# Patient Record
Sex: FEMALE | HISPANIC OR LATINO | Employment: PART TIME | URBAN - METROPOLITAN AREA
[De-identification: names, ages, dates, MRNs, and addresses within clinical notes are randomized per-mention and may not be internally consistent; named-entity substitution may affect disease eponyms.]

---

## 2018-06-20 DIAGNOSIS — Z01.812 PRE-PROCEDURAL LABORATORY EXAMINATION: ICD-10-CM

## 2018-06-20 LAB
HCG SERPL QL: NEGATIVE
INR PPP: 1.02 (ref 0.87–1.13)
PLATELET # BLD AUTO: 251 K/UL (ref 164–446)
PROTHROMBIN TIME: 13.1 SEC (ref 12–14.6)

## 2018-06-20 PROCEDURE — 84703 CHORIONIC GONADOTROPIN ASSAY: CPT

## 2018-06-20 PROCEDURE — 85610 PROTHROMBIN TIME: CPT

## 2018-06-20 PROCEDURE — 85049 AUTOMATED PLATELET COUNT: CPT

## 2018-06-20 PROCEDURE — 36415 COLL VENOUS BLD VENIPUNCTURE: CPT

## 2018-06-20 RX ORDER — MYCOPHENOLATE MOFETIL 500 MG/1
500 TABLET ORAL 2 TIMES DAILY
Status: ON HOLD | COMMUNITY
End: 2018-06-22

## 2018-06-21 ENCOUNTER — APPOINTMENT (OUTPATIENT)
Dept: RADIOLOGY | Facility: MEDICAL CENTER | Age: 28
End: 2018-06-21
Attending: INTERNAL MEDICINE
Payer: COMMERCIAL

## 2018-06-21 ENCOUNTER — HOSPITAL ENCOUNTER (INPATIENT)
Facility: MEDICAL CENTER | Age: 28
LOS: 7 days | DRG: 919 | End: 2018-06-29
Attending: EMERGENCY MEDICINE | Admitting: INTERNAL MEDICINE
Payer: COMMERCIAL

## 2018-06-21 ENCOUNTER — APPOINTMENT (OUTPATIENT)
Dept: RADIOLOGY | Facility: MEDICAL CENTER | Age: 28
DRG: 919 | End: 2018-06-21
Attending: EMERGENCY MEDICINE
Payer: COMMERCIAL

## 2018-06-21 ENCOUNTER — HOSPITAL ENCOUNTER (OUTPATIENT)
Facility: MEDICAL CENTER | Age: 28
End: 2018-06-21
Attending: INTERNAL MEDICINE | Admitting: INTERNAL MEDICINE
Payer: COMMERCIAL

## 2018-06-21 VITALS
OXYGEN SATURATION: 95 % | WEIGHT: 156.53 LBS | HEART RATE: 95 BPM | RESPIRATION RATE: 16 BRPM | HEIGHT: 65 IN | SYSTOLIC BLOOD PRESSURE: 144 MMHG | BODY MASS INDEX: 26.08 KG/M2 | DIASTOLIC BLOOD PRESSURE: 98 MMHG | TEMPERATURE: 97.8 F

## 2018-06-21 DIAGNOSIS — R80.9 PROTEINURIA, UNSPECIFIED TYPE: ICD-10-CM

## 2018-06-21 DIAGNOSIS — N18.30 CHRONIC KIDNEY DISEASE, STAGE III (MODERATE) (HCC): ICD-10-CM

## 2018-06-21 DIAGNOSIS — M32.14 LUPUS NEPHRITIS (HCC): ICD-10-CM

## 2018-06-21 LAB
ABO GROUP BLD: NORMAL
ALBUMIN SERPL BCP-MCNC: 2.4 G/DL (ref 3.2–4.9)
ALBUMIN/GLOB SERPL: 0.6 G/DL
ALP SERPL-CCNC: 30 U/L (ref 30–99)
ALT SERPL-CCNC: 5 U/L (ref 2–50)
ANION GAP SERPL CALC-SCNC: 8 MMOL/L (ref 0–11.9)
ANISOCYTOSIS BLD QL SMEAR: ABNORMAL
AST SERPL-CCNC: 8 U/L (ref 12–45)
BARCODED ABORH UBTYP: 7300
BARCODED PRD CODE UBPRD: NORMAL
BARCODED UNIT NUM UBUNT: NORMAL
BASOPHILS # BLD AUTO: 0 % (ref 0–1.8)
BASOPHILS # BLD: 0 K/UL (ref 0–0.12)
BILIRUB SERPL-MCNC: 0.3 MG/DL (ref 0.1–1.5)
BLD GP AB SCN SERPL QL: NORMAL
BUN SERPL-MCNC: 69 MG/DL (ref 8–22)
CALCIUM SERPL-MCNC: 7.7 MG/DL (ref 8.5–10.5)
CHLORIDE SERPL-SCNC: 114 MMOL/L (ref 96–112)
CO2 SERPL-SCNC: 16 MMOL/L (ref 20–33)
COMPONENT R 8504R: NORMAL
CREAT SERPL-MCNC: 3.78 MG/DL (ref 0.5–1.4)
EOSINOPHIL # BLD AUTO: 0 K/UL (ref 0–0.51)
EOSINOPHIL NFR BLD: 0 % (ref 0–6.9)
ERYTHROCYTE [DISTWIDTH] IN BLOOD BY AUTOMATED COUNT: 53.2 FL (ref 35.9–50)
GLOBULIN SER CALC-MCNC: 3.9 G/DL (ref 1.9–3.5)
GLUCOSE SERPL-MCNC: 117 MG/DL (ref 65–99)
HCT VFR BLD AUTO: 22.3 % (ref 37–47)
HGB BLD-MCNC: 6.8 G/DL (ref 12–16)
INR PPP: 1.12 (ref 0.87–1.13)
LYMPHOCYTES # BLD AUTO: 1.19 K/UL (ref 1–4.8)
LYMPHOCYTES NFR BLD: 9.8 % (ref 22–41)
MACROCYTES BLD QL SMEAR: ABNORMAL
MANUAL DIFF BLD: NORMAL
MCH RBC QN AUTO: 29.1 PG (ref 27–33)
MCHC RBC AUTO-ENTMCNC: 30.5 G/DL (ref 33.6–35)
MCV RBC AUTO: 95.3 FL (ref 81.4–97.8)
METAMYELOCYTES NFR BLD MANUAL: 1.8 %
MICROCYTES BLD QL SMEAR: ABNORMAL
MONOCYTES # BLD AUTO: 0.65 K/UL (ref 0–0.85)
MONOCYTES NFR BLD AUTO: 5.4 % (ref 0–13.4)
MORPHOLOGY BLD-IMP: NORMAL
NEUTROPHILS # BLD AUTO: 10.04 K/UL (ref 2–7.15)
NEUTROPHILS NFR BLD: 83 % (ref 44–72)
NRBC # BLD AUTO: 0 K/UL
NRBC BLD-RTO: 0 /100 WBC
PLATELET # BLD AUTO: 263 K/UL (ref 164–446)
PLATELET BLD QL SMEAR: NORMAL
PMV BLD AUTO: 9.5 FL (ref 9–12.9)
POLYCHROMASIA BLD QL SMEAR: NORMAL
POTASSIUM SERPL-SCNC: 5.1 MMOL/L (ref 3.6–5.5)
PRODUCT TYPE UPROD: NORMAL
PROT SERPL-MCNC: 6.3 G/DL (ref 6–8.2)
PROTHROMBIN TIME: 14.1 SEC (ref 12–14.6)
RBC # BLD AUTO: 2.34 M/UL (ref 4.2–5.4)
RBC BLD AUTO: PRESENT
RH BLD: NORMAL
SCHISTOCYTES BLD QL SMEAR: NORMAL
SODIUM SERPL-SCNC: 138 MMOL/L (ref 135–145)
UNIT STATUS USTAT: NORMAL
WBC # BLD AUTO: 12.1 K/UL (ref 4.8–10.8)

## 2018-06-21 PROCEDURE — 36430 TRANSFUSION BLD/BLD COMPNT: CPT

## 2018-06-21 PROCEDURE — 80053 COMPREHEN METABOLIC PANEL: CPT

## 2018-06-21 PROCEDURE — 700111 HCHG RX REV CODE 636 W/ 250 OVERRIDE (IP): Performed by: RADIOLOGY

## 2018-06-21 PROCEDURE — 74176 CT ABD & PELVIS W/O CONTRAST: CPT

## 2018-06-21 PROCEDURE — 50200 RENAL BIOPSY PERQ: CPT

## 2018-06-21 PROCEDURE — 700111 HCHG RX REV CODE 636 W/ 250 OVERRIDE (IP)

## 2018-06-21 PROCEDURE — 85610 PROTHROMBIN TIME: CPT

## 2018-06-21 PROCEDURE — 30233N1 TRANSFUSION OF NONAUTOLOGOUS RED BLOOD CELLS INTO PERIPHERAL VEIN, PERCUTANEOUS APPROACH: ICD-10-PCS | Performed by: EMERGENCY MEDICINE

## 2018-06-21 PROCEDURE — 99291 CRITICAL CARE FIRST HOUR: CPT | Performed by: INTERNAL MEDICINE

## 2018-06-21 PROCEDURE — 96361 HYDRATE IV INFUSION ADD-ON: CPT

## 2018-06-21 PROCEDURE — 86901 BLOOD TYPING SEROLOGIC RH(D): CPT

## 2018-06-21 PROCEDURE — 85027 COMPLETE CBC AUTOMATED: CPT

## 2018-06-21 PROCEDURE — 99291 CRITICAL CARE FIRST HOUR: CPT

## 2018-06-21 PROCEDURE — 87086 URINE CULTURE/COLONY COUNT: CPT

## 2018-06-21 PROCEDURE — 86850 RBC ANTIBODY SCREEN: CPT

## 2018-06-21 PROCEDURE — 700105 HCHG RX REV CODE 258: Performed by: EMERGENCY MEDICINE

## 2018-06-21 PROCEDURE — 86923 COMPATIBILITY TEST ELECTRIC: CPT

## 2018-06-21 PROCEDURE — 96376 TX/PRO/DX INJ SAME DRUG ADON: CPT

## 2018-06-21 PROCEDURE — 96375 TX/PRO/DX INJ NEW DRUG ADDON: CPT

## 2018-06-21 PROCEDURE — 36415 COLL VENOUS BLD VENIPUNCTURE: CPT

## 2018-06-21 PROCEDURE — 700111 HCHG RX REV CODE 636 W/ 250 OVERRIDE (IP): Performed by: EMERGENCY MEDICINE

## 2018-06-21 PROCEDURE — 88300 SURGICAL PATH GROSS: CPT

## 2018-06-21 PROCEDURE — A9270 NON-COVERED ITEM OR SERVICE: HCPCS

## 2018-06-21 PROCEDURE — P9016 RBC LEUKOCYTES REDUCED: HCPCS

## 2018-06-21 PROCEDURE — 88333 PATH CONSLTJ SURG CYTO XM 1: CPT

## 2018-06-21 PROCEDURE — 85007 BL SMEAR W/DIFF WBC COUNT: CPT

## 2018-06-21 PROCEDURE — 96374 THER/PROPH/DIAG INJ IV PUSH: CPT

## 2018-06-21 PROCEDURE — 85025 COMPLETE CBC W/AUTO DIFF WBC: CPT

## 2018-06-21 PROCEDURE — 700102 HCHG RX REV CODE 250 W/ 637 OVERRIDE(OP)

## 2018-06-21 PROCEDURE — 86900 BLOOD TYPING SEROLOGIC ABO: CPT

## 2018-06-21 PROCEDURE — 160002 HCHG RECOVERY MINUTES (STAT)

## 2018-06-21 RX ORDER — OXYCODONE HYDROCHLORIDE 5 MG/1
10 TABLET ORAL
Status: DISCONTINUED | OUTPATIENT
Start: 2018-06-21 | End: 2018-06-21 | Stop reason: HOSPADM

## 2018-06-21 RX ORDER — MIDAZOLAM HYDROCHLORIDE 1 MG/ML
.5-2 INJECTION INTRAMUSCULAR; INTRAVENOUS PRN
Status: DISCONTINUED | OUTPATIENT
Start: 2018-06-21 | End: 2018-06-21 | Stop reason: HOSPADM

## 2018-06-21 RX ORDER — FUROSEMIDE 10 MG/ML
10 INJECTION INTRAMUSCULAR; INTRAVENOUS ONCE
Status: DISCONTINUED | OUTPATIENT
Start: 2018-06-21 | End: 2018-06-22

## 2018-06-21 RX ORDER — OXYCODONE HYDROCHLORIDE 5 MG/1
5 TABLET ORAL
Status: DISCONTINUED | OUTPATIENT
Start: 2018-06-21 | End: 2018-06-21 | Stop reason: HOSPADM

## 2018-06-21 RX ORDER — OXYCODONE HYDROCHLORIDE 5 MG/1
TABLET ORAL
Status: COMPLETED
Start: 2018-06-21 | End: 2018-06-21

## 2018-06-21 RX ORDER — LORAZEPAM 2 MG/ML
INJECTION INTRAMUSCULAR
Status: DISPENSED
Start: 2018-06-21 | End: 2018-06-22

## 2018-06-21 RX ORDER — MORPHINE SULFATE 4 MG/ML
4 INJECTION, SOLUTION INTRAMUSCULAR; INTRAVENOUS ONCE
Status: COMPLETED | OUTPATIENT
Start: 2018-06-21 | End: 2018-06-21

## 2018-06-21 RX ORDER — SODIUM CHLORIDE 9 MG/ML
500 INJECTION, SOLUTION INTRAVENOUS
Status: DISCONTINUED | OUTPATIENT
Start: 2018-06-21 | End: 2018-06-21 | Stop reason: HOSPADM

## 2018-06-21 RX ORDER — MORPHINE SULFATE 10 MG/ML
4 INJECTION, SOLUTION INTRAMUSCULAR; INTRAVENOUS
Status: DISCONTINUED | OUTPATIENT
Start: 2018-06-21 | End: 2018-06-21 | Stop reason: HOSPADM

## 2018-06-21 RX ORDER — HYDROMORPHONE HYDROCHLORIDE 2 MG/ML
1 INJECTION, SOLUTION INTRAMUSCULAR; INTRAVENOUS; SUBCUTANEOUS ONCE
Status: COMPLETED | OUTPATIENT
Start: 2018-06-21 | End: 2018-06-21

## 2018-06-21 RX ORDER — SODIUM CHLORIDE 9 MG/ML
1000 INJECTION, SOLUTION INTRAVENOUS ONCE
Status: COMPLETED | OUTPATIENT
Start: 2018-06-21 | End: 2018-06-21

## 2018-06-21 RX ORDER — MIDAZOLAM HYDROCHLORIDE 1 MG/ML
INJECTION INTRAMUSCULAR; INTRAVENOUS
Status: COMPLETED
Start: 2018-06-21 | End: 2018-06-21

## 2018-06-21 RX ORDER — ONDANSETRON 2 MG/ML
4 INJECTION INTRAMUSCULAR; INTRAVENOUS PRN
Status: DISCONTINUED | OUTPATIENT
Start: 2018-06-21 | End: 2018-06-21 | Stop reason: HOSPADM

## 2018-06-21 RX ORDER — HYDROXYCHLOROQUINE SULFATE 200 MG/1
200 TABLET, FILM COATED ORAL 2 TIMES DAILY
COMMUNITY

## 2018-06-21 RX ORDER — ONDANSETRON 2 MG/ML
4 INJECTION INTRAMUSCULAR; INTRAVENOUS EVERY 8 HOURS PRN
Status: DISCONTINUED | OUTPATIENT
Start: 2018-06-21 | End: 2018-06-21 | Stop reason: HOSPADM

## 2018-06-21 RX ORDER — ONDANSETRON 2 MG/ML
4 INJECTION INTRAMUSCULAR; INTRAVENOUS ONCE
Status: COMPLETED | OUTPATIENT
Start: 2018-06-21 | End: 2018-06-21

## 2018-06-21 RX ORDER — SODIUM CHLORIDE 9 MG/ML
INJECTION, SOLUTION INTRAVENOUS CONTINUOUS
Status: DISCONTINUED | OUTPATIENT
Start: 2018-06-21 | End: 2018-06-22

## 2018-06-21 RX ORDER — PREDNISONE 20 MG/1
60 TABLET ORAL DAILY
COMMUNITY

## 2018-06-21 RX ADMIN — HYDROMORPHONE HYDROCHLORIDE 1 MG: 2 INJECTION INTRAMUSCULAR; INTRAVENOUS; SUBCUTANEOUS at 21:44

## 2018-06-21 RX ADMIN — OXYCODONE HYDROCHLORIDE 10 MG: 5 TABLET ORAL at 15:15

## 2018-06-21 RX ADMIN — HYDROMORPHONE HYDROCHLORIDE 1 MG: 2 INJECTION INTRAMUSCULAR; INTRAVENOUS; SUBCUTANEOUS at 20:16

## 2018-06-21 RX ADMIN — MORPHINE SULFATE 4 MG: 4 INJECTION INTRAVENOUS at 23:15

## 2018-06-21 RX ADMIN — MIDAZOLAM 2 MG: 1 INJECTION INTRAMUSCULAR; INTRAVENOUS at 14:00

## 2018-06-21 RX ADMIN — SODIUM CHLORIDE 1000 ML: 9 INJECTION, SOLUTION INTRAVENOUS at 23:04

## 2018-06-21 RX ADMIN — ONDANSETRON 4 MG: 2 INJECTION INTRAMUSCULAR; INTRAVENOUS at 20:16

## 2018-06-21 RX ADMIN — FENTANYL CITRATE 50 MCG: 50 INJECTION, SOLUTION INTRAMUSCULAR; INTRAVENOUS at 14:00

## 2018-06-21 RX ADMIN — SODIUM CHLORIDE 1000 ML: 9 INJECTION, SOLUTION INTRAVENOUS at 20:30

## 2018-06-21 RX ADMIN — MIDAZOLAM HYDROCHLORIDE 2 MG: 1 INJECTION INTRAMUSCULAR; INTRAVENOUS at 14:04

## 2018-06-21 RX ADMIN — MIDAZOLAM HYDROCHLORIDE 2 MG: 1 INJECTION INTRAMUSCULAR; INTRAVENOUS at 14:00

## 2018-06-21 ASSESSMENT — ENCOUNTER SYMPTOMS
PALPITATIONS: 0
DIZZINESS: 0
ABDOMINAL PAIN: 1
VOMITING: 1
FEVER: 0
NAUSEA: 1
FLANK PAIN: 1

## 2018-06-21 ASSESSMENT — PAIN SCALES - GENERAL
PAINLEVEL_OUTOF10: 8
PAINLEVEL_OUTOF10: 7
PAINLEVEL_OUTOF10: 8
PAINLEVEL_OUTOF10: 10
PAINLEVEL_OUTOF10: 0
PAINLEVEL_OUTOF10: 4
PAINLEVEL_OUTOF10: 10
PAINLEVEL_OUTOF10: 6
PAINLEVEL_OUTOF10: 8
PAINLEVEL_OUTOF10: 0
PAINLEVEL_OUTOF10: 10

## 2018-06-21 NOTE — PROGRESS NOTES
IR Procedure RN's Note:    Non targeted renal biopsy done by Dr. Conde; LEFT posterior lateral flank access site; pt assessed upon arrival, pt A/Ox4, pt aware of the procedure, pt baseline HTN; x cores obtained and sent with pathology team; pt appears comfortable throughout the procedure with no signs of distress or discomfort; access site CDI, soft with no signs of hematoma or bleeding, gauze and tegaderm for dressing; telephone report given to Ernestine; pt is awake and on 2L  NCO2; pt transported to PPU.    Start monitor time: 1359  Start sedation time: 1400  Case start time: 1403  Case end time: 1419    Hand off to: Ernestine  Time of hand off: 1422

## 2018-06-21 NOTE — OR NURSING
Pt to PPU s/p non targeted renal bx. Sleepy but arousable upon arrival, A&OX4. VSS throughout stay, Bps slightly elevated per her baseline, maintaining sats on RA. Bx site WNL, dressing CDI. Tolerating PO liquids with no c/o nausea. Medicated with PO analgesic for severe abdominal pain with good relief. Updated pt and s/o to POC, emotional support provided. Stable for d/c. D/C instructions given, addressed questions/concerns, WC to lobby.

## 2018-06-21 NOTE — DISCHARGE INSTRUCTIONS
ACTIVITY: Rest and take it easy for the first 24 hours.  A responsible adult is recommended to remain with you during that time.  It is normal to feel sleepy.  We encourage you to not do anything that requires balance, judgment or coordination.    MILD FLU-LIKE SYMPTOMS ARE NORMAL. YOU MAY EXPERIENCE GENERALIZED MUSCLE ACHES, THROAT IRRITATION, HEADACHE AND/OR SOME NAUSEA.    FOR 24 HOURS DO NOT:  Drive, operate machinery or run household appliances.  Drink beer or alcoholic beverages.   Make important decisions or sign legal documents.    SPECIAL INSTRUCTIONS: Ok to remove dressing tomorrow, do not submerge for one week     DIET: To avoid nausea, slowly advance diet as tolerated, avoiding spicy or greasy foods for the first day.  Add more substantial food to your diet according to your physician's instructions.  Babies can be fed formula or breast milk as soon as they are hungry.  INCREASE FLUIDS AND FIBER TO AVOID CONSTIPATION.    SURGICAL DRESSING/BATHING: May remove dressing in one week, do not submerge for one week    FOLLOW-UP APPOINTMENT:  A follow-up appointment should be arranged with your doctor; call to schedule.    You should CALL YOUR PHYSICIAN if you develop:  Fever greater than 101 degrees F.  Pain not relieved by medication, or persistent nausea or vomiting.  Excessive bleeding (blood soaking through dressing) or unexpected drainage from the wound.  Extreme redness or swelling around the incision site, drainage of pus or foul smelling drainage.  Inability to urinate or empty your bladder within 8 hours.  Problems with breathing or chest pain.    You should call 911 if you develop problems with breathing or chest pain.  If you are unable to contact your doctor or surgical center, you should go to the nearest emergency room or urgent care center.  Physician's telephone #:  [phone]    If any questions arise, call your doctor.  If your doctor is not available, please feel free to call the  Surgical Center at [phone].  The Center is open Monday through Friday from 7AM to 7PM.  You can also call the HEALTH HOTLINE open 24 hours/day, 7 days/week and speak to a nurse at [phone], or toll free at [phone].    A registered nurse may call you a few days after your surgery to see how you are doing after your procedure.    MEDICATIONS: Resume taking daily medication.  Take prescribed pain medication with food.  If no medication is prescribed, you may take non-aspirin pain medication if needed.  PAIN MEDICATION CAN BE VERY CONSTIPATING.  Take a stool softener or laxative such as senokot, pericolace, or milk of magnesia if needed.      If your physician has prescribed pain medication that includes Acetaminophen (Tylenol), do not take additional Acetaminophen (Tylenol) while taking the prescribed medication.    Depression / Suicide Risk    As you are discharged from this [facility] facility, it is important to learn how to keep safe from harming yourself.    Recognize the warning signs:  · Abrupt changes in personality, positive or negative- including increase in energy   · Giving away possessions  · Change in eating patterns- significant weight changes-  positive or negative  · Change in sleeping patterns- unable to sleep or sleeping all the time   · Unwillingness or inability to communicate  · Depression  · Unusual sadness, discouragement and loneliness  · Talk of wanting to die  · Neglect of personal appearance   · Rebelliousness- reckless behavior  · Withdrawal from people/activities they love  · Confusion- inability to concentrate     If you or a loved one observes any of these behaviors or has concerns about self-harm, here's what you can do:  · Talk about it- your feelings and reasons for harming yourself  · Remove any means that you might use to hurt yourself (examples: pills, rope, extension cords, firearm)  · Get professional help from the community (Mental Health, Substance Abuse,  psychological counseling)  · Do not be alone:Call your Safe Contact- someone whom you trust who will be there for you.  · Call your local CRISIS HOTLINE 315-1201 or 925-804-5108  · Call your local Children's Mobile Crisis Response Team [place] (866) 995-9032 or www.[employer] How To  · Call the toll free National Suicide Prevention Hotlines   · National Suicide Prevention Lifeline 343-752-MTCK (0106)  · National Hope Line Network 800-SUICIDE (987-2082)

## 2018-06-21 NOTE — OR SURGEON
Immediate Post- Operative Note        PostOp Diagnosis: RENAL DYSFUNCTION      Procedure(s): ULTRASOUND GUIDED LEFT RENAL BIOPSY    Estimated Blood Loss: Less than 5 ml        Complications: None            6/21/2018     2:17 PM     Mack Conde

## 2018-06-22 ENCOUNTER — APPOINTMENT (OUTPATIENT)
Dept: RADIOLOGY | Facility: MEDICAL CENTER | Age: 28
DRG: 919 | End: 2018-06-22
Attending: INTERNAL MEDICINE
Payer: COMMERCIAL

## 2018-06-22 ENCOUNTER — APPOINTMENT (OUTPATIENT)
Dept: RADIOLOGY | Facility: MEDICAL CENTER | Age: 28
DRG: 919 | End: 2018-06-22
Attending: HOSPITALIST
Payer: COMMERCIAL

## 2018-06-22 PROBLEM — M32.9 LUPUS (SYSTEMIC LUPUS ERYTHEMATOSUS) (HCC): Chronic | Status: ACTIVE | Noted: 2018-06-22

## 2018-06-22 PROBLEM — E87.5 HYPERKALEMIA: Status: ACTIVE | Noted: 2018-06-22

## 2018-06-22 PROBLEM — M32.14 LUPUS NEPHRITIS (HCC): Status: ACTIVE | Noted: 2018-06-22

## 2018-06-22 PROBLEM — I31.39 PERICARDIAL EFFUSION: Status: ACTIVE | Noted: 2018-06-22

## 2018-06-22 PROBLEM — R82.71 ASYMPTOMATIC BACTERIURIA: Status: ACTIVE | Noted: 2018-06-22

## 2018-06-22 PROBLEM — S36.892A TRAUMATIC RETROPERITONEAL HEMATOMA: Status: ACTIVE | Noted: 2018-06-22

## 2018-06-22 PROBLEM — E83.39 HYPERPHOSPHATEMIA: Status: ACTIVE | Noted: 2018-06-22

## 2018-06-22 PROBLEM — D64.9 ANEMIA: Status: ACTIVE | Noted: 2018-06-22

## 2018-06-22 PROBLEM — N04.9 RENAL ANASARCA: Status: ACTIVE | Noted: 2018-06-22

## 2018-06-22 LAB
ANION GAP SERPL CALC-SCNC: 10 MMOL/L (ref 0–11.9)
ANION GAP SERPL CALC-SCNC: 6 MMOL/L (ref 0–11.9)
ANION GAP SERPL CALC-SCNC: 6 MMOL/L (ref 0–11.9)
ANISOCYTOSIS BLD QL SMEAR: ABNORMAL
APPEARANCE UR: ABNORMAL
BACTERIA #/AREA URNS HPF: ABNORMAL /HPF
BASOPHILS # BLD AUTO: 0.4 % (ref 0–1.8)
BASOPHILS # BLD: 0.05 K/UL (ref 0–0.12)
BILIRUB UR QL STRIP.AUTO: NEGATIVE
BUN SERPL-MCNC: 67 MG/DL (ref 8–22)
BUN SERPL-MCNC: 69 MG/DL (ref 8–22)
BUN SERPL-MCNC: 69 MG/DL (ref 8–22)
CALCIUM SERPL-MCNC: 7.8 MG/DL (ref 8.5–10.5)
CALCIUM SERPL-MCNC: 8.2 MG/DL (ref 8.5–10.5)
CALCIUM SERPL-MCNC: 8.2 MG/DL (ref 8.5–10.5)
CFT BLD TEG: 4.8 MIN (ref 5–10)
CHLORIDE SERPL-SCNC: 114 MMOL/L (ref 96–112)
CHLORIDE SERPL-SCNC: 115 MMOL/L (ref 96–112)
CHLORIDE SERPL-SCNC: 116 MMOL/L (ref 96–112)
CLOT ANGLE BLD TEG: 66.5 DEGREES (ref 53–72)
CLOT LYSIS 30M P MA LENFR BLD TEG: 2.6 % (ref 0–8)
CO2 SERPL-SCNC: 15 MMOL/L (ref 20–33)
COLOR UR: YELLOW
COMMENT 1642: NORMAL
CREAT SERPL-MCNC: 3.48 MG/DL (ref 0.5–1.4)
CREAT SERPL-MCNC: 3.68 MG/DL (ref 0.5–1.4)
CREAT SERPL-MCNC: 3.76 MG/DL (ref 0.5–1.4)
CT.EXTRINSIC BLD ROTEM: 1.8 MIN (ref 1–3)
EKG IMPRESSION: NORMAL
EOSINOPHIL # BLD AUTO: 0.01 K/UL (ref 0–0.51)
EOSINOPHIL NFR BLD: 0.1 % (ref 0–6.9)
EPI CELLS #/AREA URNS HPF: ABNORMAL /HPF
ERYTHROCYTE [DISTWIDTH] IN BLOOD BY AUTOMATED COUNT: 49.7 FL (ref 35.9–50)
ERYTHROCYTE [DISTWIDTH] IN BLOOD BY AUTOMATED COUNT: 50.2 FL (ref 35.9–50)
ERYTHROCYTE [DISTWIDTH] IN BLOOD BY AUTOMATED COUNT: 52.1 FL (ref 35.9–50)
ERYTHROCYTE [DISTWIDTH] IN BLOOD BY AUTOMATED COUNT: 55.2 FL (ref 35.9–50)
GLUCOSE SERPL-MCNC: 126 MG/DL (ref 65–99)
GLUCOSE SERPL-MCNC: 86 MG/DL (ref 65–99)
GLUCOSE SERPL-MCNC: 94 MG/DL (ref 65–99)
GLUCOSE UR STRIP.AUTO-MCNC: NEGATIVE MG/DL
HCT VFR BLD AUTO: 22 % (ref 37–47)
HCT VFR BLD AUTO: 22.6 % (ref 37–47)
HCT VFR BLD AUTO: 25.1 % (ref 37–47)
HCT VFR BLD AUTO: 25.4 % (ref 37–47)
HGB BLD-MCNC: 6.4 G/DL (ref 12–16)
HGB BLD-MCNC: 7.2 G/DL (ref 12–16)
HGB BLD-MCNC: 8.3 G/DL (ref 12–16)
HGB BLD-MCNC: 8.6 G/DL (ref 12–16)
HYPOCHROMIA BLD QL SMEAR: ABNORMAL
IMM GRANULOCYTES # BLD AUTO: 0.31 K/UL (ref 0–0.11)
IMM GRANULOCYTES NFR BLD AUTO: 2.8 % (ref 0–0.9)
KETONES UR STRIP.AUTO-MCNC: NEGATIVE MG/DL
LACTATE BLD-SCNC: 0.9 MMOL/L (ref 0.5–2)
LEUKOCYTE ESTERASE UR QL STRIP.AUTO: ABNORMAL
LYMPHOCYTES # BLD AUTO: 1.63 K/UL (ref 1–4.8)
LYMPHOCYTES NFR BLD: 14.6 % (ref 22–41)
MACROCYTES BLD QL SMEAR: ABNORMAL
MAGNESIUM SERPL-MCNC: 2.2 MG/DL (ref 1.5–2.5)
MCF BLD TEG: 63.2 MM (ref 50–70)
MCH RBC QN AUTO: 28.7 PG (ref 27–33)
MCH RBC QN AUTO: 29.8 PG (ref 27–33)
MCH RBC QN AUTO: 30.3 PG (ref 27–33)
MCH RBC QN AUTO: 30.5 PG (ref 27–33)
MCHC RBC AUTO-ENTMCNC: 29.1 G/DL (ref 33.6–35)
MCHC RBC AUTO-ENTMCNC: 31.9 G/DL (ref 33.6–35)
MCHC RBC AUTO-ENTMCNC: 32.7 G/DL (ref 33.6–35)
MCHC RBC AUTO-ENTMCNC: 34.3 G/DL (ref 33.6–35)
MCV RBC AUTO: 89 FL (ref 81.4–97.8)
MCV RBC AUTO: 92.7 FL (ref 81.4–97.8)
MCV RBC AUTO: 93.4 FL (ref 81.4–97.8)
MCV RBC AUTO: 98.7 FL (ref 81.4–97.8)
MICRO URNS: ABNORMAL
MONOCYTES # BLD AUTO: 0.84 K/UL (ref 0–0.85)
MONOCYTES NFR BLD AUTO: 7.5 % (ref 0–13.4)
MORPHOLOGY BLD-IMP: NORMAL
NEUTROPHILS # BLD AUTO: 8.34 K/UL (ref 2–7.15)
NEUTROPHILS NFR BLD: 74.6 % (ref 44–72)
NITRITE UR QL STRIP.AUTO: NEGATIVE
NRBC # BLD AUTO: 0 K/UL
NRBC BLD-RTO: 0 /100 WBC
PH UR STRIP.AUTO: 5 [PH]
PHOSPHATE SERPL-MCNC: 8.2 MG/DL (ref 2.5–4.5)
PHOSPHATE SERPL-MCNC: 8.4 MG/DL (ref 2.5–4.5)
PHOSPHATE SERPL-MCNC: 8.7 MG/DL (ref 2.5–4.5)
PLATELET # BLD AUTO: 163 K/UL (ref 164–446)
PLATELET # BLD AUTO: 168 K/UL (ref 164–446)
PLATELET # BLD AUTO: 183 K/UL (ref 164–446)
PLATELET # BLD AUTO: 227 K/UL (ref 164–446)
PLATELET BLD QL SMEAR: NORMAL
PMV BLD AUTO: 10 FL (ref 9–12.9)
PMV BLD AUTO: 10.2 FL (ref 9–12.9)
PMV BLD AUTO: 9.8 FL (ref 9–12.9)
PMV BLD AUTO: 9.9 FL (ref 9–12.9)
POLYCHROMASIA BLD QL SMEAR: NORMAL
POTASSIUM SERPL-SCNC: 5.5 MMOL/L (ref 3.6–5.5)
POTASSIUM SERPL-SCNC: 6 MMOL/L (ref 3.6–5.5)
POTASSIUM SERPL-SCNC: 6.2 MMOL/L (ref 3.6–5.5)
PROT UR QL STRIP: 300 MG/DL
RBC # BLD AUTO: 2.23 M/UL (ref 4.2–5.4)
RBC # BLD AUTO: 2.42 M/UL (ref 4.2–5.4)
RBC # BLD AUTO: 2.74 M/UL (ref 4.2–5.4)
RBC # BLD AUTO: 2.82 M/UL (ref 4.2–5.4)
RBC # URNS HPF: ABNORMAL /HPF
RBC BLD AUTO: PRESENT
RBC UR QL AUTO: ABNORMAL
SODIUM SERPL-SCNC: 136 MMOL/L (ref 135–145)
SODIUM SERPL-SCNC: 137 MMOL/L (ref 135–145)
SODIUM SERPL-SCNC: 139 MMOL/L (ref 135–145)
SP GR UR STRIP.AUTO: 1.02
TEG ALGORITHM TGALG: ABNORMAL
UROBILINOGEN UR STRIP.AUTO-MCNC: 0.2 MG/DL
WBC # BLD AUTO: 11.2 K/UL (ref 4.8–10.8)
WBC # BLD AUTO: 6.1 K/UL (ref 4.8–10.8)
WBC # BLD AUTO: 7.2 K/UL (ref 4.8–10.8)
WBC # BLD AUTO: 8.9 K/UL (ref 4.8–10.8)
WBC #/AREA URNS HPF: ABNORMAL /HPF

## 2018-06-22 PROCEDURE — 700101 HCHG RX REV CODE 250: Performed by: INTERNAL MEDICINE

## 2018-06-22 PROCEDURE — 700102 HCHG RX REV CODE 250 W/ 637 OVERRIDE(OP): Performed by: STUDENT IN AN ORGANIZED HEALTH CARE EDUCATION/TRAINING PROGRAM

## 2018-06-22 PROCEDURE — A9270 NON-COVERED ITEM OR SERVICE: HCPCS | Performed by: STUDENT IN AN ORGANIZED HEALTH CARE EDUCATION/TRAINING PROGRAM

## 2018-06-22 PROCEDURE — 85576 BLOOD PLATELET AGGREGATION: CPT

## 2018-06-22 PROCEDURE — 700111 HCHG RX REV CODE 636 W/ 250 OVERRIDE (IP): Performed by: EMERGENCY MEDICINE

## 2018-06-22 PROCEDURE — 700111 HCHG RX REV CODE 636 W/ 250 OVERRIDE (IP): Performed by: INTERNAL MEDICINE

## 2018-06-22 PROCEDURE — 700105 HCHG RX REV CODE 258: Performed by: INTERNAL MEDICINE

## 2018-06-22 PROCEDURE — 700111 HCHG RX REV CODE 636 W/ 250 OVERRIDE (IP): Performed by: STUDENT IN AN ORGANIZED HEALTH CARE EDUCATION/TRAINING PROGRAM

## 2018-06-22 PROCEDURE — 81001 URINALYSIS AUTO W/SCOPE: CPT

## 2018-06-22 PROCEDURE — P9016 RBC LEUKOCYTES REDUCED: HCPCS

## 2018-06-22 PROCEDURE — B518ZZA FLUOROSCOPY OF SUPERIOR VENA CAVA, GUIDANCE: ICD-10-PCS | Performed by: RADIOLOGY

## 2018-06-22 PROCEDURE — 99291 CRITICAL CARE FIRST HOUR: CPT | Performed by: INTERNAL MEDICINE

## 2018-06-22 PROCEDURE — 96376 TX/PRO/DX INJ SAME DRUG ADON: CPT

## 2018-06-22 PROCEDURE — 83605 ASSAY OF LACTIC ACID: CPT

## 2018-06-22 PROCEDURE — 51798 US URINE CAPACITY MEASURE: CPT

## 2018-06-22 PROCEDURE — 700102 HCHG RX REV CODE 250 W/ 637 OVERRIDE(OP): Performed by: HOSPITALIST

## 2018-06-22 PROCEDURE — 770022 HCHG ROOM/CARE - ICU (200)

## 2018-06-22 PROCEDURE — 85027 COMPLETE CBC AUTOMATED: CPT

## 2018-06-22 PROCEDURE — 700101 HCHG RX REV CODE 250

## 2018-06-22 PROCEDURE — 76775 US EXAM ABDO BACK WALL LIM: CPT

## 2018-06-22 PROCEDURE — 84100 ASSAY OF PHOSPHORUS: CPT

## 2018-06-22 PROCEDURE — 700111 HCHG RX REV CODE 636 W/ 250 OVERRIDE (IP): Performed by: RADIOLOGY

## 2018-06-22 PROCEDURE — 86923 COMPATIBILITY TEST ELECTRIC: CPT | Mod: 91

## 2018-06-22 PROCEDURE — 93010 ELECTROCARDIOGRAM REPORT: CPT | Performed by: INTERNAL MEDICINE

## 2018-06-22 PROCEDURE — 700101 HCHG RX REV CODE 250: Performed by: STUDENT IN AN ORGANIZED HEALTH CARE EDUCATION/TRAINING PROGRAM

## 2018-06-22 PROCEDURE — 02HV33Z INSERTION OF INFUSION DEVICE INTO SUPERIOR VENA CAVA, PERCUTANEOUS APPROACH: ICD-10-PCS | Performed by: RADIOLOGY

## 2018-06-22 PROCEDURE — C1894 INTRO/SHEATH, NON-LASER: HCPCS

## 2018-06-22 PROCEDURE — A9270 NON-COVERED ITEM OR SERVICE: HCPCS | Performed by: HOSPITALIST

## 2018-06-22 PROCEDURE — A4314 CATH W/DRAINAGE 2-WAY LATEX: HCPCS | Performed by: INTERNAL MEDICINE

## 2018-06-22 PROCEDURE — 93005 ELECTROCARDIOGRAM TRACING: CPT | Performed by: HOSPITALIST

## 2018-06-22 PROCEDURE — 80048 BASIC METABOLIC PNL TOTAL CA: CPT

## 2018-06-22 PROCEDURE — 700111 HCHG RX REV CODE 636 W/ 250 OVERRIDE (IP)

## 2018-06-22 PROCEDURE — B4171ZZ FLUOROSCOPY OF LEFT RENAL ARTERY USING LOW OSMOLAR CONTRAST: ICD-10-PCS | Performed by: RADIOLOGY

## 2018-06-22 PROCEDURE — 36430 TRANSFUSION BLD/BLD COMPNT: CPT

## 2018-06-22 PROCEDURE — B548ZZA ULTRASONOGRAPHY OF SUPERIOR VENA CAVA, GUIDANCE: ICD-10-PCS | Performed by: RADIOLOGY

## 2018-06-22 PROCEDURE — 85384 FIBRINOGEN ACTIVITY: CPT

## 2018-06-22 PROCEDURE — 83735 ASSAY OF MAGNESIUM: CPT

## 2018-06-22 PROCEDURE — 700111 HCHG RX REV CODE 636 W/ 250 OVERRIDE (IP): Performed by: HOSPITALIST

## 2018-06-22 PROCEDURE — 99153 MOD SED SAME PHYS/QHP EA: CPT

## 2018-06-22 PROCEDURE — 85347 COAGULATION TIME ACTIVATED: CPT

## 2018-06-22 PROCEDURE — 700117 HCHG RX CONTRAST REV CODE 255: Performed by: RADIOLOGY

## 2018-06-22 RX ORDER — PREDNISONE 20 MG/1
60 TABLET ORAL DAILY
Status: DISCONTINUED | OUTPATIENT
Start: 2018-06-22 | End: 2018-06-29 | Stop reason: HOSPADM

## 2018-06-22 RX ORDER — MIDAZOLAM HYDROCHLORIDE 1 MG/ML
.5-2 INJECTION INTRAMUSCULAR; INTRAVENOUS PRN
Status: ACTIVE | OUTPATIENT
Start: 2018-06-22 | End: 2018-06-22

## 2018-06-22 RX ORDER — LABETALOL HYDROCHLORIDE 5 MG/ML
10 INJECTION, SOLUTION INTRAVENOUS EVERY 4 HOURS PRN
Status: DISCONTINUED | OUTPATIENT
Start: 2018-06-22 | End: 2018-06-22

## 2018-06-22 RX ORDER — FUROSEMIDE 10 MG/ML
40 INJECTION INTRAMUSCULAR; INTRAVENOUS ONCE
Status: COMPLETED | OUTPATIENT
Start: 2018-06-22 | End: 2018-06-22

## 2018-06-22 RX ORDER — MIDAZOLAM HYDROCHLORIDE 1 MG/ML
INJECTION INTRAMUSCULAR; INTRAVENOUS
Status: DISPENSED
Start: 2018-06-22 | End: 2018-06-23

## 2018-06-22 RX ORDER — SODIUM CHLORIDE 9 MG/ML
500 INJECTION, SOLUTION INTRAVENOUS
Status: ACTIVE | OUTPATIENT
Start: 2018-06-22 | End: 2018-06-22

## 2018-06-22 RX ORDER — MYCOPHENOLIC ACID 360 MG/1
720 TABLET, DELAYED RELEASE ORAL 2 TIMES DAILY
COMMUNITY

## 2018-06-22 RX ORDER — ONDANSETRON 2 MG/ML
4 INJECTION INTRAMUSCULAR; INTRAVENOUS PRN
Status: ACTIVE | OUTPATIENT
Start: 2018-06-22 | End: 2018-06-22

## 2018-06-22 RX ORDER — ERGOCALCIFEROL 1.25 MG/1
50000 CAPSULE ORAL
COMMUNITY

## 2018-06-22 RX ORDER — SODIUM POLYSTYRENE SULFONATE 15 G/60ML
30 SUSPENSION ORAL; RECTAL ONCE
Status: COMPLETED | OUTPATIENT
Start: 2018-06-22 | End: 2018-06-22

## 2018-06-22 RX ORDER — PROMETHAZINE HYDROCHLORIDE 25 MG/1
12.5-25 SUPPOSITORY RECTAL EVERY 4 HOURS PRN
Status: DISCONTINUED | OUTPATIENT
Start: 2018-06-22 | End: 2018-06-29 | Stop reason: HOSPADM

## 2018-06-22 RX ORDER — FERROUS SULFATE 325(65) MG
325 TABLET ORAL DAILY
COMMUNITY

## 2018-06-22 RX ORDER — LABETALOL HYDROCHLORIDE 5 MG/ML
10-20 INJECTION, SOLUTION INTRAVENOUS EVERY 4 HOURS PRN
Status: DISCONTINUED | OUTPATIENT
Start: 2018-06-22 | End: 2018-06-29 | Stop reason: HOSPADM

## 2018-06-22 RX ORDER — HYDRALAZINE HYDROCHLORIDE 20 MG/ML
INJECTION INTRAMUSCULAR; INTRAVENOUS
Status: DISPENSED
Start: 2018-06-22 | End: 2018-06-23

## 2018-06-22 RX ORDER — POLYETHYLENE GLYCOL 3350 17 G/17G
1 POWDER, FOR SOLUTION ORAL
Status: DISCONTINUED | OUTPATIENT
Start: 2018-06-22 | End: 2018-06-29 | Stop reason: HOSPADM

## 2018-06-22 RX ORDER — ONDANSETRON 4 MG/1
4 TABLET, ORALLY DISINTEGRATING ORAL EVERY 4 HOURS PRN
Status: DISCONTINUED | OUTPATIENT
Start: 2018-06-22 | End: 2018-06-29 | Stop reason: HOSPADM

## 2018-06-22 RX ORDER — ONDANSETRON 2 MG/ML
4 INJECTION INTRAMUSCULAR; INTRAVENOUS EVERY 4 HOURS PRN
Status: DISCONTINUED | OUTPATIENT
Start: 2018-06-22 | End: 2018-06-29 | Stop reason: HOSPADM

## 2018-06-22 RX ORDER — SODIUM POLYSTYRENE SULFONATE 15 G/60ML
15 SUSPENSION ORAL; RECTAL ONCE
Status: DISCONTINUED | OUTPATIENT
Start: 2018-06-22 | End: 2018-06-22

## 2018-06-22 RX ORDER — AMOXICILLIN 250 MG
2 CAPSULE ORAL 2 TIMES DAILY
Status: DISCONTINUED | OUTPATIENT
Start: 2018-06-22 | End: 2018-06-29 | Stop reason: HOSPADM

## 2018-06-22 RX ORDER — LABETALOL HYDROCHLORIDE 5 MG/ML
INJECTION, SOLUTION INTRAVENOUS
Status: COMPLETED
Start: 2018-06-22 | End: 2018-06-22

## 2018-06-22 RX ORDER — ACETAMINOPHEN 325 MG/1
650 TABLET ORAL EVERY 6 HOURS PRN
Status: DISCONTINUED | OUTPATIENT
Start: 2018-06-22 | End: 2018-06-29 | Stop reason: HOSPADM

## 2018-06-22 RX ORDER — SODIUM CHLORIDE 9 MG/ML
INJECTION, SOLUTION INTRAVENOUS CONTINUOUS
Status: DISCONTINUED | OUTPATIENT
Start: 2018-06-22 | End: 2018-06-22

## 2018-06-22 RX ORDER — FAMOTIDINE 20 MG/1
20 TABLET, FILM COATED ORAL 2 TIMES DAILY
Status: DISCONTINUED | OUTPATIENT
Start: 2018-06-22 | End: 2018-06-22

## 2018-06-22 RX ORDER — LOSARTAN POTASSIUM 25 MG/1
25 TABLET ORAL DAILY
Status: ON HOLD | COMMUNITY
End: 2018-06-29

## 2018-06-22 RX ORDER — DEXTROSE MONOHYDRATE 25 G/50ML
25 INJECTION, SOLUTION INTRAVENOUS ONCE
Status: COMPLETED | OUTPATIENT
Start: 2018-06-22 | End: 2018-06-22

## 2018-06-22 RX ORDER — MYCOPHENOLATE MOFETIL 250 MG/1
500 CAPSULE ORAL 2 TIMES DAILY
Status: DISCONTINUED | OUTPATIENT
Start: 2018-06-22 | End: 2018-06-23

## 2018-06-22 RX ORDER — HYDROXYCHLOROQUINE SULFATE 200 MG/1
200 TABLET, FILM COATED ORAL 2 TIMES DAILY
Status: DISCONTINUED | OUTPATIENT
Start: 2018-06-22 | End: 2018-06-29 | Stop reason: HOSPADM

## 2018-06-22 RX ORDER — HYDRALAZINE HYDROCHLORIDE 20 MG/ML
10-20 INJECTION INTRAMUSCULAR; INTRAVENOUS EVERY 4 HOURS PRN
Status: DISCONTINUED | OUTPATIENT
Start: 2018-06-22 | End: 2018-06-29 | Stop reason: HOSPADM

## 2018-06-22 RX ORDER — BISACODYL 10 MG
10 SUPPOSITORY, RECTAL RECTAL
Status: DISCONTINUED | OUTPATIENT
Start: 2018-06-22 | End: 2018-06-29 | Stop reason: HOSPADM

## 2018-06-22 RX ORDER — DEXTROSE MONOHYDRATE 25 G/50ML
25 INJECTION, SOLUTION INTRAVENOUS
Status: DISCONTINUED | OUTPATIENT
Start: 2018-06-22 | End: 2018-06-29 | Stop reason: HOSPADM

## 2018-06-22 RX ORDER — HYDRALAZINE HYDROCHLORIDE 20 MG/ML
10 INJECTION INTRAMUSCULAR; INTRAVENOUS EVERY 4 HOURS PRN
Status: DISCONTINUED | OUTPATIENT
Start: 2018-06-22 | End: 2018-06-22

## 2018-06-22 RX ORDER — MORPHINE SULFATE 4 MG/ML
4 INJECTION, SOLUTION INTRAMUSCULAR; INTRAVENOUS ONCE
Status: COMPLETED | OUTPATIENT
Start: 2018-06-22 | End: 2018-06-22

## 2018-06-22 RX ORDER — LOSARTAN POTASSIUM 25 MG/1
25 TABLET ORAL
Status: DISCONTINUED | OUTPATIENT
Start: 2018-06-22 | End: 2018-06-22

## 2018-06-22 RX ORDER — PROMETHAZINE HYDROCHLORIDE 25 MG/1
12.5-25 TABLET ORAL EVERY 4 HOURS PRN
Status: DISCONTINUED | OUTPATIENT
Start: 2018-06-22 | End: 2018-06-29 | Stop reason: HOSPADM

## 2018-06-22 RX ORDER — HYDROCODONE BITARTRATE AND ACETAMINOPHEN 5; 325 MG/1; MG/1
1 TABLET ORAL EVERY 6 HOURS PRN
Status: DISCONTINUED | OUTPATIENT
Start: 2018-06-22 | End: 2018-06-29 | Stop reason: HOSPADM

## 2018-06-22 RX ORDER — CANDESARTAN 4 MG/1
4 TABLET ORAL DAILY
Status: ON HOLD | COMMUNITY
End: 2018-06-29

## 2018-06-22 RX ADMIN — CEFTRIAXONE 2 G: 2 INJECTION, POWDER, FOR SOLUTION INTRAMUSCULAR; INTRAVENOUS at 07:57

## 2018-06-22 RX ADMIN — ONDANSETRON 4 MG: 2 INJECTION INTRAMUSCULAR; INTRAVENOUS at 04:00

## 2018-06-22 RX ADMIN — ONDANSETRON 4 MG: 2 INJECTION INTRAMUSCULAR; INTRAVENOUS at 21:05

## 2018-06-22 RX ADMIN — HYDRALAZINE HYDROCHLORIDE 10 MG: 20 INJECTION INTRAMUSCULAR; INTRAVENOUS at 19:28

## 2018-06-22 RX ADMIN — FUROSEMIDE 40 MG: 10 INJECTION, SOLUTION INTRAMUSCULAR; INTRAVENOUS at 07:24

## 2018-06-22 RX ADMIN — LIDOCAINE HYDROCHLORIDE 1 MG: 20 INJECTION, SOLUTION INTRAVENOUS at 15:00

## 2018-06-22 RX ADMIN — HYDROCODONE BITARTRATE AND ACETAMINOPHEN 1 TABLET: 5; 325 TABLET ORAL at 13:24

## 2018-06-22 RX ADMIN — LABETALOL HYDROCHLORIDE 20 MG: 5 INJECTION, SOLUTION INTRAVENOUS at 21:35

## 2018-06-22 RX ADMIN — SODIUM BICARBONATE 50 MEQ: 84 INJECTION, SOLUTION INTRAVENOUS at 13:33

## 2018-06-22 RX ADMIN — HYDROCODONE BITARTRATE AND ACETAMINOPHEN 1 TABLET: 5; 325 TABLET ORAL at 07:24

## 2018-06-22 RX ADMIN — INSULIN HUMAN 10 UNITS: 100 INJECTION, SOLUTION PARENTERAL at 13:29

## 2018-06-22 RX ADMIN — SODIUM BICARBONATE: 84 INJECTION, SOLUTION INTRAVENOUS at 13:37

## 2018-06-22 RX ADMIN — MIDAZOLAM 1 MG: 1 INJECTION INTRAMUSCULAR; INTRAVENOUS at 15:07

## 2018-06-22 RX ADMIN — HYDRALAZINE HYDROCHLORIDE 10 MG: 20 INJECTION INTRAMUSCULAR; INTRAVENOUS at 15:00

## 2018-06-22 RX ADMIN — IOHEXOL 40 ML: 300 INJECTION, SOLUTION INTRAVENOUS at 16:26

## 2018-06-22 RX ADMIN — MYCOPHENOLATE MOFETIL 500 MG: 250 CAPSULE ORAL at 21:27

## 2018-06-22 RX ADMIN — LABETALOL HYDROCHLORIDE 10 MG: 5 INJECTION, SOLUTION INTRAVENOUS at 11:05

## 2018-06-22 RX ADMIN — MIDAZOLAM 1 MG: 1 INJECTION INTRAMUSCULAR; INTRAVENOUS at 15:04

## 2018-06-22 RX ADMIN — LABETALOL HYDROCHLORIDE 20 MG: 5 INJECTION, SOLUTION INTRAVENOUS at 15:35

## 2018-06-22 RX ADMIN — DEXTROSE MONOHYDRATE 50 ML: 25 INJECTION, SOLUTION INTRAVENOUS at 13:25

## 2018-06-22 RX ADMIN — PREDNISONE 60 MG: 20 TABLET ORAL at 08:02

## 2018-06-22 RX ADMIN — MORPHINE SULFATE 4 MG: 4 INJECTION INTRAVENOUS at 02:49

## 2018-06-22 RX ADMIN — SODIUM CHLORIDE 500 ML: 9 INJECTION, SOLUTION INTRAVENOUS at 13:04

## 2018-06-22 RX ADMIN — HEPARIN 500 UNITS: 100 SYRINGE at 15:16

## 2018-06-22 RX ADMIN — HYDROCODONE BITARTRATE AND ACETAMINOPHEN 1 TABLET: 5; 325 TABLET ORAL at 21:08

## 2018-06-22 RX ADMIN — ONDANSETRON 4 MG: 2 INJECTION INTRAMUSCULAR; INTRAVENOUS at 08:53

## 2018-06-22 RX ADMIN — MYCOPHENOLATE MOFETIL 500 MG: 250 CAPSULE ORAL at 08:02

## 2018-06-22 RX ADMIN — HYDROXYCHLOROQUINE SULFATE 200 MG: 200 TABLET, FILM COATED ORAL at 21:25

## 2018-06-22 RX ADMIN — HYDROXYCHLOROQUINE SULFATE 200 MG: 200 TABLET, FILM COATED ORAL at 08:03

## 2018-06-22 RX ADMIN — SODIUM BICARBONATE: 84 INJECTION, SOLUTION INTRAVENOUS at 21:33

## 2018-06-22 RX ADMIN — SODIUM POLYSTYRENE SULFONATE 30 G: 15 SUSPENSION ORAL; RECTAL at 13:24

## 2018-06-22 ASSESSMENT — ENCOUNTER SYMPTOMS
CHILLS: 0
DIZZINESS: 0
NAUSEA: 0
DEPRESSION: 0
CARDIOVASCULAR NEGATIVE: 1
HEADACHES: 1
FOCAL WEAKNESS: 0
RESPIRATORY NEGATIVE: 1
BLURRED VISION: 0
NEUROLOGICAL NEGATIVE: 1
PALPITATIONS: 0
VOMITING: 0
DOUBLE VISION: 0
FALLS: 0
CONSTIPATION: 0
NAUSEA: 1
BLOOD IN STOOL: 0
DIARRHEA: 0
ABDOMINAL PAIN: 1
FLANK PAIN: 1
MYALGIAS: 0
FEVER: 0
SPUTUM PRODUCTION: 0
VOMITING: 1
WEAKNESS: 0
EYES NEGATIVE: 1
NECK PAIN: 0
CONSTITUTIONAL NEGATIVE: 1
SHORTNESS OF BREATH: 0
BACK PAIN: 1

## 2018-06-22 ASSESSMENT — PAIN SCALES - GENERAL
PAINLEVEL_OUTOF10: 7
PAINLEVEL_OUTOF10: 7
PAINLEVEL_OUTOF10: 6
PAINLEVEL_OUTOF10: 0
PAINLEVEL_OUTOF10: 6
PAINLEVEL_OUTOF10: 5
PAINLEVEL_OUTOF10: 7
PAINLEVEL_OUTOF10: 5
PAINLEVEL_OUTOF10: 7

## 2018-06-22 ASSESSMENT — PATIENT HEALTH QUESTIONNAIRE - PHQ9
SUM OF ALL RESPONSES TO PHQ9 QUESTIONS 1 AND 2: 0
1. LITTLE INTEREST OR PLEASURE IN DOING THINGS: NOT AT ALL
2. FEELING DOWN, DEPRESSED, IRRITABLE, OR HOPELESS: NOT AT ALL

## 2018-06-22 ASSESSMENT — COPD QUESTIONNAIRES
COPD SCREENING SCORE: 0
DO YOU EVER COUGH UP ANY MUCUS OR PHLEGM?: NO/ONLY WITH OCCASIONAL COLDS OR INFECTIONS
HAVE YOU SMOKED AT LEAST 100 CIGARETTES IN YOUR ENTIRE LIFE: NO/DON'T KNOW
DURING THE PAST 4 WEEKS HOW MUCH DID YOU FEEL SHORT OF BREATH: NONE/LITTLE OF THE TIME

## 2018-06-22 ASSESSMENT — LIFESTYLE VARIABLES
EVER_SMOKED: NEVER
SUBSTANCE_ABUSE: 0

## 2018-06-22 NOTE — H&P
Internal Medicine Admitting History and Physical    Note Author: Myah Frias M.D.       Name Mendy Solano     1990   Age/Sex 28 y.o. female   MRN [id number]   Code Status FULL     After 5PM or if no immediate response to page, please call for cross-coverage  Attending/Team:Dr. Wu/MARKO Hardy See Patient List for primary contact information  Call [phone] to page    1st Call - Day Intern (R1):   LINA HARDY 2nd Call - Day Sr. Resident (R2/R3):   PAGE [employer] [facility]       Chief Complaint:  Left flank pain    HPI:  Pleasant 38-year-old female comes in secondary to chief complaint noted above.  Patient states she had a renal biopsy today secondary to lupus.  She was diagnosed 2 years ago and this is her second biopsy.  States after the procedure she woke up feeling nauseated and had sharp and cramping type flank pain. States she was given pain medication and discharged home.  Her pain however continued to worsen and started vomiting.  Reports numerous episodes of emesis non-bloody/non-bilious after which she decided to come to the ED for further evaluation.  Denies any dysuria, frequency, urgency, hematuria, diarrhea, constipation, melena, trauma post procedure, syncope, lightheadedness, dizziness, headache, cp/palpitations, vision changes, use of NSAIDs, ASA, ibuprofen, or any other over-the-counter medications.  Does report abdominal pain and bloating which has started 2 or 3 days prior to the procedure.      Review of Systems   Constitutional: Negative.    HENT: Negative.    Eyes: Negative.    Respiratory: Negative.    Cardiovascular: Negative.    Gastrointestinal: Positive for abdominal pain, nausea and vomiting. Negative for blood in stool, constipation, diarrhea and melena.   Genitourinary: Positive for flank pain. Negative for dysuria, frequency, hematuria and urgency.   Musculoskeletal: Positive for back pain. Negative for falls, myalgias and neck pain.   Skin: Negative.    Neurological:  "Negative.    Endo/Heme/Allergies: Negative.    Psychiatric/Behavioral: Negative for substance abuse and suicidal ideas.             Past Medical History:   Past Medical History:   Diagnosis Date   • Anemia    • Hypertension    • IUD (intrauterine device) in place 06/20/2018   • Lupus    • Renal disorder 06/20/2018    \"Stage 3 kidney disease\"       Past Surgical History:  Past Surgical History:   Procedure Laterality Date   • PELVISCOPY  8/30/08    Performed by JEREMIAH MENJIVAR at SURGERY [facility] ORS   • LYSIS ADHESIONS GYN  8/30/08    Performed by JEREMIAH MENJIVAR at SURGERY [facility] ORS       Current Outpatient Medications:  Home Medications     Reviewed by Valeria Riddle (Pharmacy Tech) on 06/21/18 at 2205  Med List Status: Partial   Medication Last Dose Status   hydrocodone-acetaminophen (NORCO) 5-325 MG Tab per tablet 6/21/2018 Active   hydroxychloroquine (PLAQUENIL) 200 MG Tab 6/20/2018 Active   LOSARTAN POTASSIUM PO 6/20/2018 Active   mycophenolate (CELLCEPT) 500 MG tablet >week Active   predniSONE (DELTASONE) 20 MG Tab 6/20/2018 Active                Medication Allergy/Sensitivities:  Allergies   Allergen Reactions   • Nkda [No Known Drug Allergy]          Family History:  Family History   Problem Relation Age of Onset   • Arthritis Mother        Social History:  Social History     Social History   • Marital status: Single     Spouse name: N/A   • Number of children: N/A   • Years of education: N/A     Occupational History   • Not on file.     Social History Main Topics   • Smoking status: Never Smoker   • Smokeless tobacco: Never Used   • Alcohol use No   • Drug use: No   • Sexual activity: Not on file     Other Topics Concern   • Not on file     Social History Narrative   • No narrative on file     Living situation: with her   PCP : Pcp Pt States None        Physical Exam     Vitals:    06/21/18 2330 06/22/18 0000 06/22/18 0031 06/22/18 0044   BP:       Pulse: (!) 111  (!) 109    Resp: " "18 16 17 15   Temp:  37.1 °C (98.7 °F)  36.5 °C (97.7 °F)   TempSrc:       SpO2: 100% 97% 100%    Weight:       Height:         Body mass index is 26.13 kg/m².  /97   Pulse (!) 109   Temp 36.5 °C (97.7 °F)   Resp 15   Ht 1.651 m (5' 5\")   Wt 71.2 kg (157 lb)   LMP 05/28/2018 Comment: \"IUD in place\"  SpO2 100%   BMI 26.13 kg/m²   O2 therapy: Pulse Oximetry: 100 %    Physical Exam   Constitutional: She is well-developed, well-nourished, and in no distress. No distress.   HENT:   Head: Normocephalic.   Mouth/Throat: No oropharyngeal exudate.   Eyes: EOM are normal. Pupils are equal, round, and reactive to light. Right eye exhibits no discharge. Left eye exhibits no discharge. No scleral icterus.   Pale conjunctiva   Neck: Normal range of motion. Neck supple. No JVD present. No tracheal deviation present. No thyromegaly present.   Cardiovascular: Normal rate, regular rhythm, normal heart sounds and intact distal pulses.  Exam reveals no gallop and no friction rub.    No murmur heard.  Pulmonary/Chest: Breath sounds normal. No respiratory distress. She has no wheezes. She has no rales.   Abdominal: Soft. Bowel sounds are normal. She exhibits no distension. There is tenderness. There is no rebound and no guarding.   Left lower quadrant abdominal pain and left flank pain.   Musculoskeletal: Normal range of motion. She exhibits no edema or deformity.   Tender with minimal palpation at biopsy site, but no erythema, edema, ecchymosis noted.   Skin: Skin is warm and dry. No rash noted. She is not diaphoretic. No erythema. There is pallor.   Psychiatric: Memory and affect normal.             Data Review       Old Records Request:   Completed  Current Records review/summary: Completed    Lab Data Review:  Recent Results (from the past 24 hour(s))   COD (ADULT)    Collection Time: 06/21/18  7:52 PM   Result Value Ref Range    ABO Grouping Only B     Rh Grouping Only POS     Antibody Screen-Cod NEG     Component R   "     R3                  Red Blood Cells3    [id number]   transfused   06/21/18   22:51      Product Type Red Blood Cells LR Pheresis     Dispense Status Transfused     Unit Number (Barcoded) [id number]     Product Code (Barcoded) Q1242G44     Blood Type (Barcoded) 7300     Component R       R3                  Red Blood Cells3    [id number]   issued       06/22/18   00:50      Product Type Red Blood Cells LR Pheresis     Dispense Status Issued     Unit Number (Barcoded) [id number]     Product Code (Barcoded) N4587D14     Blood Type (Barcoded) 7300    CBC WITH DIFFERENTIAL    Collection Time: 06/21/18  7:55 PM   Result Value Ref Range    WBC 12.1 (H) 4.8 - 10.8 K/uL    RBC 2.34 (L) 4.20 - 5.40 M/uL    Hemoglobin 6.8 (L) 12.0 - 16.0 g/dL    Hematocrit 22.3 (L) 37.0 - 47.0 %    MCV 95.3 81.4 - 97.8 fL    MCH 29.1 27.0 - 33.0 pg    MCHC 30.5 (L) 33.6 - 35.0 g/dL    RDW 53.2 (H) 35.9 - 50.0 fL    Platelet Count 263 164 - 446 K/uL    MPV 9.5 9.0 - 12.9 fL    Neutrophils-Polys 83.00 (H) 44.00 - 72.00 %    Lymphocytes 9.80 (L) 22.00 - 41.00 %    Monocytes 5.40 0.00 - 13.40 %    Eosinophils 0.00 0.00 - 6.90 %    Basophils 0.00 0.00 - 1.80 %    Nucleated RBC 0.00 /100 WBC    Neutrophils (Absolute) 10.04 (H) 2.00 - 7.15 K/uL    Lymphs (Absolute) 1.19 1.00 - 4.80 K/uL    Monos (Absolute) 0.65 0.00 - 0.85 K/uL    Eos (Absolute) 0.00 0.00 - 0.51 K/uL    Baso (Absolute) 0.00 0.00 - 0.12 K/uL    NRBC (Absolute) 0.00 K/uL    Anisocytosis 1+     Macrocytosis 1+     Microcytosis 1+    CMP    Collection Time: 06/21/18  7:55 PM   Result Value Ref Range    Sodium 138 135 - 145 mmol/L    Potassium 5.1 3.6 - 5.5 mmol/L    Chloride 114 (H) 96 - 112 mmol/L    Co2 16 (L) 20 - 33 mmol/L    Anion Gap 8.0 0.0 - 11.9    Glucose 117 (H) 65 - 99 mg/dL    Bun 69 (H) 8 - 22 mg/dL    Creatinine 3.78 (H) 0.50 - 1.40 mg/dL    Calcium 7.7 (L) 8.5 - 10.5 mg/dL    AST(SGOT) 8 (L) 12 - 45 U/L    ALT(SGPT) 5 2 - 50 U/L    Alkaline Phosphatase 30  30 - 99 U/L    Total Bilirubin 0.3 0.1 - 1.5 mg/dL    Albumin 2.4 (L) 3.2 - 4.9 g/dL    Total Protein 6.3 6.0 - 8.2 g/dL    Globulin 3.9 (H) 1.9 - 3.5 g/dL    A-G Ratio 0.6 g/dL   PT/INR    Collection Time: 06/21/18  7:55 PM   Result Value Ref Range    PT 14.1 12.0 - 14.6 sec    INR 1.12 0.87 - 1.13   ESTIMATED GFR    Collection Time: 06/21/18  7:55 PM   Result Value Ref Range    GFR If  17 (A) >60 mL/min/1.73 m 2    GFR If Non  14 (A) >60 mL/min/1.73 m 2   DIFFERENTIAL MANUAL    Collection Time: 06/21/18  7:55 PM   Result Value Ref Range    Metamyelocytes 1.80 %    Manual Diff Status PERFORMED    PERIPHERAL SMEAR REVIEW    Collection Time: 06/21/18  7:55 PM   Result Value Ref Range    Peripheral Smear Review see below    PLATELET ESTIMATE    Collection Time: 06/21/18  7:55 PM   Result Value Ref Range    Plt Estimation Normal    MORPHOLOGY    Collection Time: 06/21/18  7:55 PM   Result Value Ref Range    RBC Morphology Present     Polychromia 1+     Schistocytes 1+        Imaging/Procedures Review:    Independant Imaging Review: Completed  CT-ABDOMEN-PELVIS W/O   Final Result         1. Large left perinephric space and left retroperitoneal hemorrhage and hematoma extending to the left lower quadrant and pelvis..      2. Large pericardial effusion. Small right pleural effusion.      3. Diffuse anasarca, abdominal and pelvic ascites.      CRITICAL RESULT READ BACK: Preliminary findings discussed with and critical read back performed by Dr. BIMAL SANTACRUZ in the Emergency Department via telephone on 6/21/2018 10:04 PM      US-RENAL    (Results Pending)         EKG:   Pending    Records reviewed and summarized in current documentation :  No not available  [employer] teaching service handout given to patient:  No             Assessment/Plan     No new Assessment & Plan notes have been filed under this hospital service since the last note was generated.  Service: [facility]    # Lupus  -  Status post left renal biopsy by Dr. Conde secondary to renal dysfunction.  Reports pain, nausea, and numerous episodes of nonbilious/non-bloody emesis soon after the biopsy.  # Leukocytosis -likely due to steroids however she does have neutrophils of 83.0.  Continue to monitor.  Currently afebrile, tachycardic, normotensive.    PLAN  - Admit to ICU for closer monitoring  - Frequent flank check for possible hematoma  - Follow H&H as well as electrolytes every 6 hours and replete as necessary  - Goal Hgb > 7  - Received 2u pRBC in ED for Hgb 6.8  - TEG study  - NPO   - Ultrasound renal in a.m.  - Day team to consult IR Dr. Conde for further management plans  - Continue home meds      Anticipated Hospital stay:  >2 midnights      Quality Measures  Quality-Core Measures   Reviewed items::  Labs reviewed, Medications reviewed and Radiology images reviewed  Desai catheter::  Critically Ill - Requiring Accurate Measurement of Urinary Output  DVT prophylaxis pharmacological::  Contraindicated - Anemia requiring blood transfusion  DVT prophylaxis - mechanical:  SCDs    PCP: @PCP

## 2018-06-22 NOTE — ED NOTES
Blood administration initiated,  Pt educated on s/sx of reaction, verbalizes understanding. Updated on POC/ admit.

## 2018-06-22 NOTE — ED NOTES
Pt moved to room 2. Report given to daylin. Pt c/o increased pain. ERP notified. Verbal order 1mg dilaudid

## 2018-06-22 NOTE — ED TRIAGE NOTES
Chief Complaint   Patient presents with   • LLQ Pain     Pt reports to have had a left kidney biopsy today and now having severe left flank pain/ pt denies dysuria     Explained to pt triage process, made pt aware to tell this RN of any changes/concerns, pt verbalized understanding of process and instructions given. Pt to DEANDRE wilhelm.

## 2018-06-22 NOTE — CONSULTS
Nephrology History and Physical    Note Author: Mike Dumont M.D.       Name Mendy Solano     1990   Age/Sex 28 y.o. female   MRN [id number]   Code Status Full Code       Chief Complaint:  AWA on CKD complicated with left perinephric hemorrhage after renal biopsy, acute blood loss requiring transfusion.     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 In the ER she underwent CT scan that showed large perinephric hematoma with a hemoglobin of 6.8 and  large pericardial effusion with generalized/diffuse anasarca, abdominal and pelvic ascites. She remained hemodynamically stable and received total of 3 units of PRBCs since admission and underwent TEG study that showed hypercoagulable pattern with adequate parameters. U/S of the renal showed large hematoma anterior and lateral to the left kidney with moderate pelvic ascites and trace perihepatic ascites. She was also found to have acute kidney injury superimposed on chronic kidney injury complicated with hyperkalemia.    Given her extensive large left perinephric hemorrhage status post biopsy she underwent a renal angiogram of the left kidney that showed no evidence of active bleeding, pseudoaneurysm, AV fistula or other evidence of vascular injury requiring no coil embolization. She did receive contrast during the study.    She was diagnosed with lupus in 2018 and underwent biopsy on 2018 that showed mixed diffuse proliferative membranous lupus nephritis. On 2018 she was noted to have acute worsening of her creatinine and proteinuria and  "complement levels were low with anti-double-stranded DNA levels being high and was started on prednisone 60 mg and CellCept dose was increased and recommended to start mycophenolate mofetil. Given her worsening kidney function she was recommended to get a renal biopsy despite the treatment.        Review of Systems   Unable to perform ROS: Acuity of condition             Past Medical History:   Past Medical History:   Diagnosis Date   • Anemia    • Hypertension    • IUD (intrauterine device) in place 06/20/2018   • Lupus    • Renal disorder 06/20/2018    \"Stage 3 kidney disease\"       Past Surgical History:  Past Surgical History:   Procedure Laterality Date   • PELVISCOPY  8/30/08    Performed by JEREMIAH MENJIVAR at SUR[facility]ERY [facility] ORS   • LYSIS ADHESIONS GYN  8/30/08    Performed by JEREMIAH MENJIVAR at [facility] OR[facility]       Current Outpatient Medications:  Home Medications     Reviewed by Valeria Collins (Pharmacy Tech) on 06/22/18 at 0930  Med List Status: Complete   Medication Last Dose Status   candesartan (ATACAND) 4 MG Tab  Active   ferrous sulfate 325 (65 Fe) MG tablet  Active   hydrocodone-acetaminophen (NORCO) 5-325 MG Tab per tablet 6/21/2018 Active   hydroxychloroquine (PLAQUENIL) 200 MG Tab 6/20/2018 Active   losartan (COZAAR) 25 MG Tab  Active   mycophenolate sodium (MYFORTIC) 360 MG Tablet Delayed Response tablet week Active   predniSONE (DELTASONE) 20 MG Tab 6/20/2018 Active   vitamin D, Ergocalciferol, (DRISDOL) 49862 units Cap capsule  Active                Medication Allergy/Sensitivities:  Allergies   Allergen Reactions   • Nkda [No Known Drug Allergy]          Family History:  Family History   Problem Relation Age of Onset   • Arthritis Mother        Social History:  Social History     Social History   • Marital status: Single     Spouse name: N/A   • Number of children: N/A   • Years of education: N/A     Occupational History   • Not on file.     Social History Main Topics " "  • Smoking status: Never Smoker   • Smokeless tobacco: Never Used   • Alcohol use No   • Drug use: No   • Sexual activity: Not on file     Other Topics Concern   • Not on file     Social History Narrative   • No narrative on file     Living situation: At home  PCP : Pcp Pt States None        Physical Exam     Vitals:    06/22/18 1715 06/22/18 1730 06/22/18 1745 06/22/18 1800   BP:       Pulse: (!) 103 (!) 102 (!) 103 (!) 103   Resp: 15 19 18 17   Temp:       TempSrc:       SpO2: 92% 98% 98% 98%   Weight:       Height:         Body mass index is 27.04 kg/m².  /96   Pulse (!) 103   Temp 37.9 °C (100.3 °F)   Resp 17   Ht 1.651 m (5' 5\")   Wt 73.7 kg (162 lb 7.7 oz)   LMP 05/28/2018 Comment: \"IUD in place\"  SpO2 98%   Breastfeeding? No   BMI 27.04 kg/m²   O2 therapy: Pulse Oximetry: 98 %, O2 (LPM): 0, FiO2%: 21 %, O2 Delivery: None (Room Air)    Physical Exam   Constitutional: She is oriented to person, place, and time. No distress.   HENT:   Head: Normocephalic and atraumatic.   Hemodialysis catheter in place.    Eyes: No scleral icterus.   Cardiovascular: Normal rate, regular rhythm, normal heart sounds and intact distal pulses.  Exam reveals no gallop and no friction rub.    No murmur heard.  Tachycardia.    Pulmonary/Chest: Effort normal. No respiratory distress. She has no wheezes.   Decreased breath sounds on the bases.    Abdominal: Soft. She exhibits distension. There is tenderness. There is no rebound and no guarding.   Hypoactive bowel sounds, distended.  No flank bruising.        Musculoskeletal: Normal range of motion. She exhibits edema. She exhibits no tenderness or deformity.   1+ pitting edema.    Lymphadenopathy:     She has no cervical adenopathy.   Neurological: She is alert and oriented to person, place, and time.   Skin: She is not diaphoretic.             Data Review       Old Records Request:   Completed  Current Records review/summary: Completed    Lab Data Review:  Recent Results " (from the past 24 hour(s))   COD (ADULT)    Collection Time: 06/21/18  7:52 PM   Result Value Ref Range    ABO Grouping Only B     Rh Grouping Only POS     Antibody Screen-Cod NEG     Component R       R3                  Red Blood Cells3    [id number]   transfused   06/21/18   22:51      Product Type Red Blood Cells LR Pheresis     Dispense Status Transfused     Unit Number (Barcoded) [id number]     Product Code (Barcoded) Q9893R17     Blood Type (Barcoded) 7300     Component R       R3                  Red Blood Cells3    [id number]   issued       06/22/18   00:50      Product Type Red Blood Cells LR Pheresis     Dispense Status Issued     Unit Number (Barcoded) [id number]     Product Code (Barcoded) P9350E63     Blood Type (Barcoded) 7300     Component R       R3                  Red Blood Cells3    [id number]   issued       06/22/18   12:46      Product Type Red Blood Cells LR Pheresis     Dispense Status Issued     Unit Number (Barcoded) [id number]     Product Code (Barcoded) G3843E11     Blood Type (Barcoded) 7300    CBC WITH DIFFERENTIAL    Collection Time: 06/21/18  7:55 PM   Result Value Ref Range    WBC 12.1 (H) 4.8 - 10.8 K/uL    RBC 2.34 (L) 4.20 - 5.40 M/uL    Hemoglobin 6.8 (L) 12.0 - 16.0 g/dL    Hematocrit 22.3 (L) 37.0 - 47.0 %    MCV 95.3 81.4 - 97.8 fL    MCH 29.1 27.0 - 33.0 pg    MCHC 30.5 (L) 33.6 - 35.0 g/dL    RDW 53.2 (H) 35.9 - 50.0 fL    Platelet Count 263 164 - 446 K/uL    MPV 9.5 9.0 - 12.9 fL    Neutrophils-Polys 83.00 (H) 44.00 - 72.00 %    Lymphocytes 9.80 (L) 22.00 - 41.00 %    Monocytes 5.40 0.00 - 13.40 %    Eosinophils 0.00 0.00 - 6.90 %    Basophils 0.00 0.00 - 1.80 %    Nucleated RBC 0.00 /100 WBC    Neutrophils (Absolute) 10.04 (H) 2.00 - 7.15 K/uL    Lymphs (Absolute) 1.19 1.00 - 4.80 K/uL    Monos (Absolute) 0.65 0.00 - 0.85 K/uL    Eos (Absolute) 0.00 0.00 - 0.51 K/uL    Baso (Absolute) 0.00 0.00 - 0.12 K/uL    NRBC (Absolute) 0.00 K/uL    Anisocytosis 1+      Macrocytosis 1+     Microcytosis 1+    CMP    Collection Time: 06/21/18  7:55 PM   Result Value Ref Range    Sodium 138 135 - 145 mmol/L    Potassium 5.1 3.6 - 5.5 mmol/L    Chloride 114 (H) 96 - 112 mmol/L    Co2 16 (L) 20 - 33 mmol/L    Anion Gap 8.0 0.0 - 11.9    Glucose 117 (H) 65 - 99 mg/dL    Bun 69 (H) 8 - 22 mg/dL    Creatinine 3.78 (H) 0.50 - 1.40 mg/dL    Calcium 7.7 (L) 8.5 - 10.5 mg/dL    AST(SGOT) 8 (L) 12 - 45 U/L    ALT(SGPT) 5 2 - 50 U/L    Alkaline Phosphatase 30 30 - 99 U/L    Total Bilirubin 0.3 0.1 - 1.5 mg/dL    Albumin 2.4 (L) 3.2 - 4.9 g/dL    Total Protein 6.3 6.0 - 8.2 g/dL    Globulin 3.9 (H) 1.9 - 3.5 g/dL    A-G Ratio 0.6 g/dL   PT/INR    Collection Time: 06/21/18  7:55 PM   Result Value Ref Range    PT 14.1 12.0 - 14.6 sec    INR 1.12 0.87 - 1.13   ESTIMATED GFR    Collection Time: 06/21/18  7:55 PM   Result Value Ref Range    GFR If  17 (A) >60 mL/min/1.73 m 2    GFR If Non  14 (A) >60 mL/min/1.73 m 2   DIFFERENTIAL MANUAL    Collection Time: 06/21/18  7:55 PM   Result Value Ref Range    Metamyelocytes 1.80 %    Manual Diff Status PERFORMED    PERIPHERAL SMEAR REVIEW    Collection Time: 06/21/18  7:55 PM   Result Value Ref Range    Peripheral Smear Review see below    PLATELET ESTIMATE    Collection Time: 06/21/18  7:55 PM   Result Value Ref Range    Plt Estimation Normal    MORPHOLOGY    Collection Time: 06/21/18  7:55 PM   Result Value Ref Range    RBC Morphology Present     Polychromia 1+     Schistocytes 1+    CBC WITH DIFFERENTIAL    Collection Time: 06/21/18  8:44 PM   Result Value Ref Range    WBC 11.2 (H) 4.8 - 10.8 K/uL    RBC 2.23 (L) 4.20 - 5.40 M/uL    Hemoglobin 6.4 (L) 12.0 - 16.0 g/dL    Hematocrit 22.0 (L) 37.0 - 47.0 %    MCV 98.7 (H) 81.4 - 97.8 fL    MCH 28.7 27.0 - 33.0 pg    MCHC 29.1 (L) 33.6 - 35.0 g/dL    RDW 55.2 (H) 35.9 - 50.0 fL    Platelet Count 227 164 - 446 K/uL    MPV 9.8 9.0 - 12.9 fL    Neutrophils-Polys 74.60 (H)  44.00 - 72.00 %    Lymphocytes 14.60 (L) 22.00 - 41.00 %    Monocytes 7.50 0.00 - 13.40 %    Eosinophils 0.10 0.00 - 6.90 %    Basophils 0.40 0.00 - 1.80 %    Immature Granulocytes 2.80 (H) 0.00 - 0.90 %    Nucleated RBC 0.00 /100 WBC    Lymphs (Absolute) 1.63 1.00 - 4.80 K/uL    Monos (Absolute) 0.84 0.00 - 0.85 K/uL    Eos (Absolute) 0.01 0.00 - 0.51 K/uL    Baso (Absolute) 0.05 0.00 - 0.12 K/uL    Immature Granulocytes (abs) 0.31 (H) 0.00 - 0.11 K/uL    NRBC (Absolute) 0.00 K/uL    Neutrophils (Absolute) 8.34 (H) 2.00 - 7.15 K/uL    Hypochromia 1+     Anisocytosis 1+     Macrocytosis 1+    PERIPHERAL SMEAR REVIEW    Collection Time: 06/21/18  8:44 PM   Result Value Ref Range    Peripheral Smear Review see below    PLATELET ESTIMATE    Collection Time: 06/21/18  8:44 PM   Result Value Ref Range    Plt Estimation Normal    MORPHOLOGY    Collection Time: 06/21/18  8:44 PM   Result Value Ref Range    RBC Morphology Present     Polychromia 1+    DIFFERENTIAL COMMENT    Collection Time: 06/21/18  8:44 PM   Result Value Ref Range    Comments-Diff see below    URINALYSIS,CULTURE IF INDICATED    Collection Time: 06/22/18  2:34 AM   Result Value Ref Range    Color Yellow     Character Cloudy (A)     Specific Gravity 1.016 <1.035    Ph 5.0 5.0 - 8.0    Glucose Negative Negative mg/dL    Ketones Negative Negative mg/dL    Protein 300 (A) Negative mg/dL    Bilirubin Negative Negative    Urobilinogen, Urine 0.2 Negative    Nitrite Negative Negative    Leukocyte Esterase Large (A) Negative    Occult Blood Large (A) Negative    Micro Urine Req Microscopic    URINE MICROSCOPIC (W/UA)    Collection Time: 06/22/18  2:34 AM   Result Value Ref Range    WBC 20-50 (A) /hpf    RBC 5-10 (A) /hpf    Bacteria Moderate (A) None /hpf    Epithelial Cells Few /hpf   BASIC TEG    Collection Time: 06/22/18  4:36 AM   Result Value Ref Range    Reaction Time Initial-R 4.8 (L) 5.0 - 10.0 min    Clot Kinetics-K 1.8 1.0 - 3.0 min    Clot Angle-Angle  66.5 53.0 - 72.0 degrees    Maximum Clot Strength-MA 63.2 50.0 - 70.0 mm    Lysis 30 minutes-LY30 2.6 0.0 - 8.0 %    TEG Algorithm Link Algorithm    CBC without Differential    Collection Time: 18  4:36 AM   Result Value Ref Range    WBC 8.9 4.8 - 10.8 K/uL    RBC 2.74 (L) 4.20 - 5.40 M/uL    Hemoglobin 8.3 (L) 12.0 - 16.0 g/dL    Hematocrit 25.4 (L) 37.0 - 47.0 %    MCV 92.7 81.4 - 97.8 fL    MCH 30.3 27.0 - 33.0 pg    MCHC 32.7 (L) 33.6 - 35.0 g/dL    RDW 49.7 35.9 - 50.0 fL    Platelet Count 183 164 - 446 K/uL    MPV 9.9 9.0 - 12.9 fL   Basic Metabolic Panel (BMP)    Collection Time: 18  4:36 AM   Result Value Ref Range    Sodium 136 135 - 145 mmol/L    Potassium 6.0 (H) 3.6 - 5.5 mmol/L    Chloride 115 (H) 96 - 112 mmol/L    Co2 15 (L) 20 - 33 mmol/L    Glucose 86 65 - 99 mg/dL    Bun 67 (H) 8 - 22 mg/dL    Creatinine 3.48 (H) 0.50 - 1.40 mg/dL    Calcium 7.8 (L) 8.5 - 10.5 mg/dL    Anion Gap 6.0 0.0 - 11.9   MAGNESIUM    Collection Time: 18  4:36 AM   Result Value Ref Range    Magnesium 2.2 1.5 - 2.5 mg/dL   PHOSPHORUS    Collection Time: 18  4:36 AM   Result Value Ref Range    Phosphorus 8.4 (H) 2.5 - 4.5 mg/dL   ESTIMATED GFR    Collection Time: 18  4:36 AM   Result Value Ref Range    GFR If  19 (A) >60 mL/min/1.73 m 2    GFR If Non  16 (A) >60 mL/min/1.73 m 2   EKG    Collection Time: 18  8:05 AM   Result Value Ref Range    Report       [facility]    Test Date:  2018  Pt Name:    EJ AZAR              Department: ER  MRN:        [id number]                      Room:       [facility]  Gender:     Female                       Technician: MICHELA  :        1990                   Requested By:HANNAH RASCON  Order #:    [id number]                    Reading MD: James Biggs MD    Measurements  Intervals                                Axis  Rate:       102                          P:          55  ME:         152                           QRS:        12  QRSD:       90                           T:          101  QT:         336  QTc:        438    Interpretive Statements  SINUS TACHYCARDIA  NONSPECIFIC T ABNORMALITIES, LATERAL LEADS  No previous ECG available for comparison    Electronically Signed On 6- 8:39:07 PDT by James Biggs MD     CBC without Differential    Collection Time: 06/22/18 11:20 AM   Result Value Ref Range    WBC 7.2 4.8 - 10.8 K/uL    RBC 2.42 (L) 4.20 - 5.40 M/uL    Hemoglobin 7.2 (L) 12.0 - 16.0 g/dL    Hematocrit 22.6 (L) 37.0 - 47.0 %    MCV 93.4 81.4 - 97.8 fL    MCH 29.8 27.0 - 33.0 pg    MCHC 31.9 (L) 33.6 - 35.0 g/dL    RDW 52.1 (H) 35.9 - 50.0 fL    Platelet Count 163 (L) 164 - 446 K/uL    MPV 10.0 9.0 - 12.9 fL   Basic Metabolic Panel (BMP)    Collection Time: 06/22/18 11:20 AM   Result Value Ref Range    Sodium 137 135 - 145 mmol/L    Potassium 6.2 (H) 3.6 - 5.5 mmol/L    Chloride 116 (H) 96 - 112 mmol/L    Co2 15 (L) 20 - 33 mmol/L    Glucose 94 65 - 99 mg/dL    Bun 69 (H) 8 - 22 mg/dL    Creatinine 3.68 (H) 0.50 - 1.40 mg/dL    Calcium 8.2 (L) 8.5 - 10.5 mg/dL    Anion Gap 6.0 0.0 - 11.9   MAGNESIUM    Collection Time: 06/22/18 11:20 AM   Result Value Ref Range    Magnesium 2.2 1.5 - 2.5 mg/dL   PHOSPHORUS    Collection Time: 06/22/18 11:20 AM   Result Value Ref Range    Phosphorus 8.7 (H) 2.5 - 4.5 mg/dL   LACTIC ACID    Collection Time: 06/22/18 11:20 AM   Result Value Ref Range    Lactic Acid 0.9 0.5 - 2.0 mmol/L   ESTIMATED GFR    Collection Time: 06/22/18 11:20 AM   Result Value Ref Range    GFR If  18 (A) >60 mL/min/1.73 m 2    GFR If Non African American 15 (A) >60 mL/min/1.73 m 2   CBC without Differential    Collection Time: 06/22/18  5:30 PM   Result Value Ref Range    WBC 6.1 4.8 - 10.8 K/uL    RBC 2.82 (L) 4.20 - 5.40 M/uL    Hemoglobin 8.6 (L) 12.0 - 16.0 g/dL    Hematocrit 25.1 (L) 37.0 - 47.0 %    MCV 89.0 81.4 - 97.8 fL    MCH 30.5 27.0 - 33.0 pg    MCHC 34.3 33.6 - 35.0  g/dL    RDW 50.2 (H) 35.9 - 50.0 fL    Platelet Count 168 164 - 446 K/uL    MPV 10.2 9.0 - 12.9 fL   MAGNESIUM    Collection Time: 06/22/18  5:30 PM   Result Value Ref Range    Magnesium 2.2 1.5 - 2.5 mg/dL   PHOSPHORUS    Collection Time: 06/22/18  5:30 PM   Result Value Ref Range    Phosphorus 8.2 (H) 2.5 - 4.5 mg/dL   Basic Metabolic Panel (BMP)    Collection Time: 06/22/18  5:30 PM   Result Value Ref Range    Sodium 139 135 - 145 mmol/L    Potassium 5.5 3.6 - 5.5 mmol/L    Chloride 114 (H) 96 - 112 mmol/L    Co2 15 (L) 20 - 33 mmol/L    Glucose 126 (H) 65 - 99 mg/dL    Bun 69 (H) 8 - 22 mg/dL    Creatinine 3.76 (H) 0.50 - 1.40 mg/dL    Calcium 8.2 (L) 8.5 - 10.5 mg/dL    Anion Gap 10.0 0.0 - 11.9   ESTIMATED GFR    Collection Time: 06/22/18  5:30 PM   Result Value Ref Range    GFR If  17 (A) >60 mL/min/1.73 m 2    GFR If Non  14 (A) >60 mL/min/1.73 m 2       Imaging/Procedures Review:    Independant Imaging Review: Completed  IR-EMBOLIZATION   Final Result      1.  Left renal angiography showing no evidence of active extravasation, pseudoaneurysm, AV fistula, or other evidence of vascular injury. No coil embolization was indicated.                  INTERPRETING LOCATION: [address] [place] [address], [facility]      IR-CVC NON TUNNELED > AGE 4   Final Result      1. ULTRASOUND AND FLUOROSCOPIC GUIDED PLACEMENT OF A RIGHT INTERNAL JUGULAR 12 [place] TRIALYSIS TRIPLE LUMEN NON-TUNNELED HEMODIALYSIS CATHETER.      2. THE HEMODIALYSIS CATHETER MAY BE USED IMMEDIATELY AS CLINICALLY INDICATED. FLUSHES PER PROTOCOL.      US-RENAL   Final Result      Redemonstration of large hematoma anterior and lateral to the left kidney.      Moderate pelvic ascites. Trace perihepatic ascites.      CT-ABDOMEN-PELVIS W/O   Final Result         1. Large left perinephric space and left retroperitoneal hemorrhage and hematoma extending to the left lower quadrant and pelvis..      2. Large pericardial effusion. Small  right pleural effusion.      3. Diffuse anasarca, abdominal and pelvic ascites.      CRITICAL RESULT READ BACK: Preliminary findings discussed with and critical read back performed by Dr. BIMAL SANTACRUZ in the Emergency Department via telephone on 6/21/2018 10:04 PM      ECHOCARDIOGRAM COMP W/O CONT    (Results Pending)            EKG:          Assessment:     1)AWA      -Multifactorial :          -2/2 to decreased renal kidney perfusion in the setting of active bleed further leading to possible ATN.      -Contrast received during IR guided arteriogram further causing contrast induced nephropathy.      - Worsening kidney failure due to lupus nephritis.     2)CKD stage III              -History of lupus induced nephritis with Stage III disease with Cr 2.08 on 05/22/2018.               - Proteinuria, rising anti-Ds DNA and low complements.               -Low Albumin 2.4  3)Hyperkalemia            - 2/2 to metabolic acidosis in the setting of AWA on CKD further aggravated due to hematoma/hemolysis plus ACEI/ARBs(on Losartan and Candesartan).            - On Bicarb drip.   4) Acute on chronic Anemia in the setting of large retroperitoneal hematoma Iatrogenic after left sided renal biopsy.          -s/p 3 units of PRBCS          -s/p IR guided arteriogram requiring no embolization.            -Remains hemodynamically stable.           -Iron supplements.  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   6) Generalized Anasarca, pelvic/perihepatic ascites 2/2 to nephrotic syndrome and worsening kidney function.             -2/2 to hypoalbuminemia in the setting of nephrotic disease.             -Nephrotic Syndrome             -AWA on CKD.  7) Vit D deficiency         -Vit D 16.3         -Vit D supplements.   8) Secondary hyperaparathyroidism            -Intact PTH 68 and Ca 8.2  9) Hyperphosphatemia           -Phosp 8.4 with low Ca in the setting of Vit D deficiency and CKD.  10) Acute on chronic Large Pericardial Effusion without hemodynamic compromise           - Echocardiogram pending.  11) Dyslipidemia          - 2/2 to nephrotic syndrome        Plan:  - Given patient advanced kidney disease with AWA superimposed on CKD with a chance or progression to ESRD we recommend putting a hemodialysis catheter.   - Please note according to evidence based medicine no acute need for HD to prevent any contrast INDUCED nephropathy unless its Gadolinium.   - No acute needs for Renal replacement therapy yet although her overall prognosis remains guarded.   -Continue with Sodium bicarb/1/2 NS @ 150 ml/hr to correct the acidosis and contrast induced nephropathy.   -Avoid any nephrotoxic agents including NSAIDs/ACEI/ARBS/Lasix/K inducing diuretics.  - Renally adjusted dosages/medications./Renal diet/low K diet.       Thanks for the consult and we shall continue to follow.       Patient seen and examined on rounds.  Agree with assessment and plan as outlined.  Thank you,    No new Assessment & Plan notes have been filed under this hospital service since the last note was generated.  Service: Nephrology      Anticipated Hospital stay:  >2 midnights        Quality Measures  Quality-Core Measures  PCP: @PCP

## 2018-06-22 NOTE — CARE PLAN
Problem: Safety  Goal: Free from accidental injury    Intervention: Initiate Safety Measures  Bed in low, locked position, near nursing station, call light within reach. Bed alarm activated, appropriate fall identifiers in place.      Problem: Risk for Deep Vein Thrombosis/Venous Thromboembolism  Goal: DVT/VTE Prevention Measures in Place    Intervention: Intermittent sequential compression device in place 23 hours per day  SCDs in place at least 23 hours per day to reduce risk of lower extremity DVT. Pt verbalizes and demonstrates understanding.

## 2018-06-22 NOTE — ED PROVIDER NOTES
ED Provider Note    CHIEF COMPLAINT  Chief Complaint   Patient presents with   • LLQ Pain     Pt reports to have had a left kidney biopsy today and now having severe left flank pain/ pt denies dysuria       HPI  Mendy Solano is a 28 y.o. female who presents for ongoing pain following IR US guided L renal biopsy by Dr. Conde indicated for renal dysfunction. Patient reports that she had pain immediately following surgery and this has progressively worsened. It is left lower quadrant and left flank. Worsening left lower quadrant. She reports associated nausea and 3 episodes of nonbloody nonbilious emesis. She continues to pass stool and flatus and denies any bright red blood per rectum or any melena. Patient denies any history of anticoagulant use. She denies any history of chronic pain. She does have a history of anemia.    REVIEW OF SYSTEMS  Review of Systems   Constitutional: Negative for fever.   Cardiovascular: Negative for chest pain and palpitations.   Gastrointestinal: Positive for abdominal pain, nausea and vomiting.   Genitourinary: Positive for flank pain.   Neurological: Negative for dizziness.       See HPI for further details. All other systems are negative.     PAST MEDICAL HISTORY   has a past medical history of Anemia; Hypertension; IUD (intrauterine device) in place (06/20/2018); Lupus; and Renal disorder (06/20/2018).    SOCIAL HISTORY  Social History     Social History Main Topics   • Smoking status: Never Smoker   • Smokeless tobacco: Never Used   • Alcohol use No   • Drug use: No   • Sexual activity: Not on file       SURGICAL HISTORY   has a past surgical history that includes pelviscopy (8/30/08) and lysis adhesions gyn (8/30/08).    CURRENT MEDICATIONS  Home Medications    **Home medications have not yet been reviewed for this encounter**         ALLERGIES  Allergies   Allergen Reactions   • Nkda [No Known Drug Allergy]        PHYSICAL EXAM  Physical Exam   Constitutional: She is oriented  to person, place, and time. She appears well-developed and well-nourished.   HENT:   Head: Normocephalic and atraumatic.   Eyes: Conjunctivae are normal. Pupils are equal, round, and reactive to light.   Neck: Normal range of motion. Neck supple.   Cardiovascular: Normal rate and regular rhythm.    Pulmonary/Chest: Effort normal and breath sounds normal. No respiratory distress. She has no wheezes. She has no rales.   Abdominal: Bowel sounds are normal. She exhibits no distension. There is no rebound and no guarding.   Left lower quadrant palpation with associated involuntary guarding, left CVA tenderness   Neurological: She is alert and oriented to person, place, and time.   Skin: Skin is warm.     Results for orders placed or performed during the hospital encounter of 06/21/18   CBC WITH DIFFERENTIAL   Result Value Ref Range    WBC 12.1 (H) 4.8 - 10.8 K/uL    RBC 2.34 (L) 4.20 - 5.40 M/uL    Hemoglobin 6.8 (L) 12.0 - 16.0 g/dL    Hematocrit 22.3 (L) 37.0 - 47.0 %    MCV 95.3 81.4 - 97.8 fL    MCH 29.1 27.0 - 33.0 pg    MCHC 30.5 (L) 33.6 - 35.0 g/dL    RDW 53.2 (H) 35.9 - 50.0 fL    Platelet Count 263 164 - 446 K/uL    MPV 9.5 9.0 - 12.9 fL    Neutrophils-Polys 83.00 (H) 44.00 - 72.00 %    Lymphocytes 9.80 (L) 22.00 - 41.00 %    Monocytes 5.40 0.00 - 13.40 %    Eosinophils 0.00 0.00 - 6.90 %    Basophils 0.00 0.00 - 1.80 %    Nucleated RBC 0.00 /100 WBC    Neutrophils (Absolute) 10.04 (H) 2.00 - 7.15 K/uL    Lymphs (Absolute) 1.19 1.00 - 4.80 K/uL    Monos (Absolute) 0.65 0.00 - 0.85 K/uL    Eos (Absolute) 0.00 0.00 - 0.51 K/uL    Baso (Absolute) 0.00 0.00 - 0.12 K/uL    NRBC (Absolute) 0.00 K/uL    Anisocytosis 1+     Macrocytosis 1+     Microcytosis 1+    CMP   Result Value Ref Range    Sodium 138 135 - 145 mmol/L    Potassium 5.1 3.6 - 5.5 mmol/L    Chloride 114 (H) 96 - 112 mmol/L    Co2 16 (L) 20 - 33 mmol/L    Anion Gap 8.0 0.0 - 11.9    Glucose 117 (H) 65 - 99 mg/dL    Bun 69 (H) 8 - 22 mg/dL    Creatinine  3.78 (H) 0.50 - 1.40 mg/dL    Calcium 7.7 (L) 8.5 - 10.5 mg/dL    AST(SGOT) 8 (L) 12 - 45 U/L    ALT(SGPT) 5 2 - 50 U/L    Alkaline Phosphatase 30 30 - 99 U/L    Total Bilirubin 0.3 0.1 - 1.5 mg/dL    Albumin 2.4 (L) 3.2 - 4.9 g/dL    Total Protein 6.3 6.0 - 8.2 g/dL    Globulin 3.9 (H) 1.9 - 3.5 g/dL    A-G Ratio 0.6 g/dL   PT/INR   Result Value Ref Range    PT 14.1 12.0 - 14.6 sec    INR 1.12 0.87 - 1.13   COD (ADULT)   Result Value Ref Range    ABO Grouping Only B     Rh Grouping Only POS     Antibody Screen-Cod NEG     Component R       R3                  Red Blood Cells3    [id number]   transfused   06/21/18   22:51      Product Type Red Blood Cells LR Pheresis     Dispense Status Transfused     Unit Number (Barcoded) [id number]     Product Code (Barcoded) K4796M07     Blood Type (Barcoded) 7300     Component R       R3                  Red Blood Cells3    [id number]   issued       06/22/18   00:50      Product Type Red Blood Cells LR Pheresis     Dispense Status Issued     Unit Number (Barcoded) [id number]     Product Code (Barcoded) O4329Q00     Blood Type (Barcoded) 7300    URINALYSIS,CULTURE IF INDICATED   Result Value Ref Range    Color Yellow     Character Cloudy (A)     Specific Gravity 1.016 <1.035    Ph 5.0 5.0 - 8.0    Glucose Negative Negative mg/dL    Ketones Negative Negative mg/dL    Protein 300 (A) Negative mg/dL    Bilirubin Negative Negative    Urobilinogen, Urine 0.2 Negative    Nitrite Negative Negative    Leukocyte Esterase Large (A) Negative    Occult Blood Large (A) Negative    Micro Urine Req Microscopic    ESTIMATED GFR   Result Value Ref Range    GFR If  17 (A) >60 mL/min/1.73 m 2    GFR If Non  14 (A) >60 mL/min/1.73 m 2   DIFFERENTIAL MANUAL   Result Value Ref Range    Metamyelocytes 1.80 %    Manual Diff Status PERFORMED    PERIPHERAL SMEAR REVIEW   Result Value Ref Range    Peripheral Smear Review see below    PLATELET ESTIMATE   Result  Value Ref Range    Plt Estimation Normal    MORPHOLOGY   Result Value Ref Range    RBC Morphology Present     Polychromia 1+     Schistocytes 1+      CT-ABDOMEN-PELVIS W/O   Final Result         1. Large left perinephric space and left retroperitoneal hemorrhage and hematoma extending to the left lower quadrant and pelvis..      2. Large pericardial effusion. Small right pleural effusion.      3. Diffuse anasarca, abdominal and pelvic ascites.      CRITICAL RESULT READ BACK: Preliminary findings discussed with and critical read back performed by Dr. BIMAL SANTACRUZ in the Emergency Department via telephone on 6/21/2018 10:04 PM      US-RENAL    (Results Pending)         COURSE & MEDICAL DECISION MAKING  Pertinent Labs & Imaging studies reviewed. (See chart for details)  Patient with concern for procedural complications such as incidental bowel perforation or possible bleeding around the site. On ultrasound patient's Morison pouch is empty however there does appear to be a large fluid collection within the parenchyma of the kidney. Patient will likely need CT scan for further evaluation to assess for possible procedural complication. Will give IV fluids, analgesics and antinausea medicines in the meantime. I have updated patient on the workup and she is comfortable with this.  Patient given IV fluids for her tachycardia and vomiting  Tachycardia improved following fluids  I discussed the case with Dr. Conde who agreed with current management and workup.  Patient's hemoglobin is less than 7, her creatinine is almost 4. I discussed this with Dr. Conde who would like to check a CT without.  CT shows a large retroperitoneal hematoma. Given patient's considerable elevation in her creatinine Dr. Conde does not feel comfortable with taking the patient for USP given this will likely result in her activity on dialysis. I discussed this reasoning with patient and I believe this is very reasonable and we have decided to follow  medical management. Patient will continue to be followed with serial hematocrits. She'll be admitted to the ICU. Her pain will be controlled. She has been made nothing by mouth. The [facility] critical care service accepted care.     I spent 37 minutes of critical care time with this patient, included management with specialists and treatment of acute blood loss anemia    FINAL IMPRESSION  1. Retroperitoneal hematoma and hemorrhage, biopsy complication, acute blood loss anemia      Electronically signed by: Kristopher Lozada, 6/21/2018 8:01 PM

## 2018-06-22 NOTE — ASSESSMENT & PLAN NOTE
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

## 2018-06-22 NOTE — CARE PLAN
Problem: Venous Thromboembolism (VTW)/Deep Vein Thrombosis (DVT) Prevention:  Goal: Patient will participate in Venous Thrombosis (VTE)/Deep Vein Thrombosis (DVT)Prevention Measures  Outcome: PROGRESSING AS EXPECTED  SCDs in place. No medication prophylaxis due to patients active bleeding.

## 2018-06-22 NOTE — CARE PLAN
Patient admitted to [facility] bed #[id number] @ 0400. Patient was picked up in the ED by BAYLEE Cabrera and aid Raegan. Patient tolerated transfer well. Vital signs remained stable. Patient admission work performed. Fiance and mother at bedside and updated on plan of care. Further updates to be given to family during morning rounds. Patient had an episode of nausea and vomiting and was treated with prn zofran x1. Patient continues to have left lower quadrant pain but declines pain medication at this time. Patient is now resting quietly.

## 2018-06-22 NOTE — PROGRESS NOTES
The Medication Reconciliation process has been completed by calling her pharmacy and receiving her current meds.     Allergies have been reviewed  Antibiotic use in 30 days - none per pharmacy    Home Pharmacy:  [facility] -

## 2018-06-22 NOTE — PROGRESS NOTES
Dr. Barcenas notified of K+ 6.2 and Hgb 7.2 at 1233. Orders received. Transfuse 1 unit PRBCs. See MAR.

## 2018-06-22 NOTE — ED NOTES
Assumed care of pt,  Report from BAYLEE Fried.  On monitor,  States no relief from pain with dilaudid,  Will cont to monitor.

## 2018-06-22 NOTE — PROGRESS NOTES
Pulmonary Critical Care Progress Note      Admit Date: 6/21/2018    Chief Complaint: perinephric hematoma    History of Present Illness: 28 yof with a PmHx of lupus, HTN and stage 3 CKD. Underwent a left renal biopsy today and since discharge has noted increasing left flank pain and abdominal pain. In the ER was found to have a large perinephric hematoma on CT with a Hg of 6.8 then 6.4. She received 2 units PRBCs in the ER and has remained hemodynamically stable. Her CT also shows a large pericardial effusion. She denies any dizziness, LOC, CP, palpitations, SOB, hematuria or melena/BRBPR. She is to be admitted to the ICU given her significant bleeding and risk for hemodynamic worsening given her effusion and size of hematoma.      Interval Events:  24 hour interval history reviewed   Tm 100.3  Ct abdo/pelvis and renal US reviewed  S/P 2units prbcs, Hb dropping and I will give additional  K 6.2  Basic Teg with adequate parameters, no platelet mapping (likely has qualitative dis)    Prednisone 60  Mycophenolate  Plaquenil     Ceftriaxone    Review of Systems   Constitutional: Negative for chills and fever.   HENT: Negative for congestion.    Eyes: Negative for blurred vision and double vision.   Respiratory: Negative for sputum production and shortness of breath.    Cardiovascular: Negative for chest pain and palpitations.   Gastrointestinal: Positive for abdominal pain. Negative for blood in stool, melena, nausea and vomiting.   Genitourinary: Negative for dysuria.   Neurological: Negative for focal weakness.   Psychiatric/Behavioral: Negative for depression.   All other systems reviewed and are negative.    Physical Exam   Constitutional: She appears well-developed and well-nourished.   HENT:   Head: Normocephalic and atraumatic.   Eyes: Conjunctivae are normal. Pupils are equal, round, and reactive to light.   Neck: No tracheal deviation present.   Cardiovascular: Regular rhythm.    Tachy rate   Pulmonary/Chest:  She has no wheezes.   Diminished throughout   Abdominal: Soft. She exhibits no distension and no mass. There is tenderness. There is no rebound and no guarding.   Musculoskeletal: She exhibits edema.   Neurological: No cranial nerve deficit.   Skin: Skin is warm and dry.   Psychiatric: She has a normal mood and affect.   Nursing note and vitals reviewed.      PFSH:  No change.    Respiratory:     Pulse Oximetry: 99 %  Chest Tube Drains:                  Invalid input(s): [id number]    HemoDynamics:  Pulse: (!) 101, Heart Rate (Monitored): (!) 101  Blood Pressure: 160/96, NIBP: (!) 163/99         Neuro:  GCS             Fluids:  Intake/Output       06/20/18 0700 - 06/21/18 0659 (Not Admitted) 06/21/18 0700 - 06/22/18 0659 06/22/18 0700 - 06/23/18 0659      [id number] 8943-4127 Total 2831-5419 0965-3928 Total 7158-8215 2523-3903 Total       Intake    P.O.  --  -- --  --  -- --  270  -- 270    P.O. -- -- -- -- -- -- 270 -- 270    Blood  --  -- --  --  412 412  350  -- 350    Volume (RELEASE RED BLOOD CELLS-ACTIVE BLEEDING) -- -- -- -- 202 202 -- -- --    Volume (RELEASE RED BLOOD CELLS-ACTIVE BLEEDING) -- -- -- -- 210 210 -- -- --    Volume (RELEASE RED BLOOD CELLS-ACTIVE BLEEDING) -- -- -- -- -- -- 350 -- 350    Total Intake -- -- -- -- 412 412 620 -- 620       Output    Urine  --  -- --  --  -- --  780  -- 780    Number of Times Voided -- -- -- -- -- -- 3 x -- 3 x    Urine Void (mL) (non-catheter) -- -- -- -- -- -- 780 -- 780    Total Output -- -- -- -- -- -- 780 -- 780       Net I/O     -- -- -- -- 412 412 -160 -- -160        Weight: 73.7 kg (162 lb 7.7 oz)  Recent Labs      06/21/18 1955 06/22/18   2436  06/22/18   1120   SODIUM  138  136  137   POTASSIUM  5.1  6.0*  6.2*   CHLORIDE  114*  115*  116*   CO2  16*  15*  15*   BUN  69*  67*  69*   CREATININE  3.78*  3.48*  3.68*   MAGNESIUM   --   2.2  2.2   PHOSPHORUS   --   8.4*  8.7*   CALCIUM  7.7*  7.8*  8.2*       GI/Nutrition:    Liver Function  Recent  Labs      18   1120   ALTSGPT  5   --    --    ASTSGOT  8*   --    --    ALKPHOSPHAT  30   --    --    TBILIRUBIN  0.3   --    --    GLUCOSE  117*  86  94       Heme:  Recent Labs      18   1345   18   1120   RBC   --    < >  2.34*  2.23*  2.74*  2.42*   HEMOGLOBIN   --    < >  6.8*  6.4*  8.3*  7.2*   HEMATOCRIT   --    < >  22.3*  22.0*  25.4*  22.6*   PLATELETCT  251   --   263  227  183  163*   PROTHROMBTM  13.1   --   14.1   --    --    --    INR  1.02   --   1.12   --    --    --     < > = values in this interval not displayed.       Infectious Disease:  Temp  Av.2 °C (99 °F)  Min: 36.2 °C (97.1 °F)  Max: 37.9 °C (100.3 °F)  Micro: cultures reviewed  Recent Labs      18   1120   WBC  12.1*  11.2*  8.9  7.2   NEUTSPOLYS  83.00*  74.60*   --    --    LYMPHOCYTES  9.80*  14.60*   --    --    MONOCYTES  5.40  7.50   --    --    EOSINOPHILS  0.00  0.10   --    --    BASOPHILS  0.00  0.40   --    --    ASTSGOT  8*   --    --    --    ALTSGPT  5   --    --    --    ALKPHOSPHAT  30   --    --    --    TBILIRUBIN  0.3   --    --    --      Current Facility-Administered Medications   Medication Dose Frequency Provider Last Rate Last Dose   • HYDROcodone-acetaminophen (NORCO) 5-325 MG per tablet 1 Tab  1 Tab Q6HRS PRN Myah Frias M.D.   1 Tab at 18 1324   • hydroxychloroquine (PLAQUENIL) tablet 200 mg  200 mg BID Myah Frias M.D.   200 mg at 18   • predniSONE (DELTASONE) tablet 60 mg  60 mg DAILY Myah Frias M.D.   60 mg at 18   • mycophenolate (CELLCEPT) capsule 500 mg  500 mg BID Myah Frias M.D.   500 mg at 18   • senna-docusate (PERICOLACE or SENOKOT S) 8.6-50 MG per tablet 2 Tab  2 Tab BID Myah Frias M.D.        And   • polyethylene glycol/lytes (MIRALAX) PACKET 1 Packet  1 Packet QDAY PRN Myah Frias,  M.D.        And   • magnesium hydroxide (MILK OF MAGNESIA) suspension 30 mL  30 mL QDAY PRN Myah Frias M.D.        And   • bisacodyl (DULCOLAX) suppository 10 mg  10 mg QDAY PRN Myah Frias M.D.       • ondansetron (ZOFRAN) syringe/vial injection 4 mg  4 mg Q4HRS PRN Myah Frias M.D.   4 mg at 06/22/18 0853   • ondansetron (ZOFRAN ODT) dispertab 4 mg  4 mg Q4HRS PRN Myah Frias M.D.   Stopped at 06/22/18 0400   • promethazine (PHENERGAN) tablet 12.5-25 mg  12.5-25 mg Q4HRS PRN Myah Frias M.D.       • promethazine (PHENERGAN) suppository 12.5-25 mg  12.5-25 mg Q4HRS PRN Myah Frias M.D.       • prochlorperazine (COMPAZINE) injection 5-10 mg  5-10 mg Q4HRS PRN Myah Frias M.D.       • acetaminophen (TYLENOL) tablet 650 mg  650 mg Q6HRS PRN Myah Frias M.D.       • glucose 4 g chewable tablet 16 g  16 g Q15 MIN PRN Sheridan Vasquez M.D.        And   • dextrose 50% (D50W) injection 25 mL  25 mL Q15 MIN PRN Sheridan Vasquez M.D.       • cefTRIAXone (ROCEPHIN) 2 g in  mL IVPB  2 g Q24HRS James Wu Jr., D.ORosemary   Stopped at 06/22/18 0827   • labetalol (NORMODYNE,TRANDATE) injection 10 mg  10 mg Q4HRS PRN Andrews Barcenas M.D.   10 mg at 06/22/18 1105   • NS infusion   Continuous Andrews Barcenas M.D.   500 mL at 06/22/18 1304   • sodium bicarbonate 8.4 % 75 mEq in 1/2 NS 1,000 mL infusion   Continuous Andrews Barcenas M.D. 150 mL/hr at 06/22/18 1337     • hydrALAZINE (APRESOLINE) injection 10 mg  10 mg Q4HRS PRN Andrews Barcenas M.D.         Last reviewed on 6/22/2018  9:30 AM by Valeria Collins    Quality  Measures:  Radiology images reviewed, Labs reviewed and Medications reviewed                      Assessment/Plan:  Post-procedural perinephric hematoma   - continues to have blood loss, s/p 2u prbcs   - discussed with IR and plan for arteriogram + embolization   - I discussed with renal as well and will have temp HD cath due to renal  function      - continue serial h/h and transfuse to keep  Hb > 7   - continues hemodynamic monitoring   - reviewed teg     Blood loss anemia              - I ordered 3rd unit now   - continue serial h/h   - transfuse for Hb < 7     Large pericardial effusion              - per patient she has a hx of effusion, unknown hx of size              - F/u Echocardiogram              - consider cardiology consult or surgical consult if unstable     CKD   - appears she had Cr of 2.08 on 5/22/2018 in scanned documents              - renal dose meds, avoid nephrotoxins   - I have started bicarb infusion   - I consulted nephrology (pt follow domen[facility] nephro)    Hyperkalemia   - related to above + metabolic acidosis   - on bicarb gtt     Anasarca and ascites              - likely related to her CKD   - will need volume removal eventually     Discussed patient condition and risk of morbidity and/or mortality with Family, RN, RT, Therapies and nephrology. And with IR  The patient remains critically ill.  Critical care time = 45 minutes in directly providing and coordinating critical care and extensive data review.  No time overlap and excludes procedures.

## 2018-06-22 NOTE — CONSULTS
"Pulmonary & Critical Care Consult Note    DATE OF CONSULTATION:  6/21/2018     REFERRING PHYSICIAN:  Kristopher Lozada M.D.     CONSULTANT:  James Wu DO     REASON FOR CONSULTATION:  Perinephric hematoma     HISTORY OF PRESENT ILLNESS:  Ms Solano is a 28 yof with a PmHx of lupus, HTN and stage 3 CKD. Underwent a left renal biopsy today and since discharge has noted increasing left flank pain and abdominal pain. In the ER was found to have a large perinephric hematoma on CT with a Hg of 6.8 then 6.4. She received 2 units PRBCs in the ER and has remained hemodynamically stable. Her CT also shows a large pericardial effusion. She denies any dizziness, LOC, CP, palpitations, SOB, hematuria or melena/BRBPR. She is to be admitted to the ICU given her significant bleeding and risk for hemodynamic worsening given her effusion and size of hematoma.     PAST MEDICAL HISTORY:  Past Medical History:   Diagnosis Date   • Anemia    • Hypertension    • IUD (intrauterine device) in place 06/20/2018   • Lupus    • Renal disorder 06/20/2018    \"Stage 3 kidney disease\"        PAST SURGICAL HISTORY:   Past Surgical History:   Procedure Laterality Date   • PELVISCOPY  8/30/08    Performed by JEREMIAH MENJIVAR at SURGERY [facility] ORS   • LYSIS ADHESIONS GYN  8/30/08    Performed by JEREMIAH MENJIVAR at SURGERY [facility] ORS        ALLERGIES:   Nkda [no known drug allergy]     MEDICATIONS PRIOR TO ADMISSION:  No current facility-administered medications on file prior to encounter.      Current Outpatient Prescriptions on File Prior to Encounter   Medication Sig Dispense Refill   • mycophenolate (CELLCEPT) 500 MG tablet Take 500 mg by mouth 2 times a day.     • hydrocodone-acetaminophen (NORCO) 5-325 MG Tab per tablet Take 1 Tab by mouth every 6 hours as needed. 30 Tab 0       SOCIAL HISTORY:   Social History     Social History   • Marital status: Single     Spouse name: N/A   • Number of children: N/A   • Years of education: N/A " "    Occupational History   • Not on file.     Social History Main Topics   • Smoking status: Never Smoker   • Smokeless tobacco: Never Used   • Alcohol use No   • Drug use: No   • Sexual activity: Not on file     Other Topics Concern   • Not on file     Social History Narrative   • No narrative on file       FAMILY HISTORY:  Family History   Problem Relation Age of Onset   • Arthritis Mother         REVIEW OF SYSTEMS:   Constitutional: No fever, no chills,  Respiratory: No cough, no hemoptysis, no dyspnea  Cardiovascular: No chest pain, no palpitations, no orthopnea, no PND, no lower extremity swelling  GI: +nausea, +vomiting, +abdominal pain, no diarrhea, no constipation, no hematochezia, no melena  : no dysuria, no frequency, no urgency, +flank pain  Endocrine: No polyuria, no polydipsia, no hair loss  Hematology: No easy bruising, no bleeding  Musculoskeletal: No joint swelling, no joint erythema, no arthralgia, no myalgias  Neuro: No seizures, no numbness, no tingling sensation, no extremity weakness, no change in vision.  Psychiatry: no depression, no suicidal ideation     PHYSICAL EXAMINATION:  /88   Pulse (!) 101   Temp 37.5 °C (99.5 °F)   Resp 18   Ht 1.651 m (5' 5\")   Wt 73.7 kg (162 lb 7.7 oz)   LMP 05/28/2018 Comment: \"IUD in place\"  SpO2 96%   BMI 27.04 kg/m²   GENERAL:  Well developed, well nourished, age appropriate appearing female  HEENT:  NC, AT, EOMI, external ears and nose normal  NECK:  Supple, no JVD  PULM:   Clear to auscultation   CVS:  Tachycardia, diminished  ABDOMEN:  Soft, left flank and LLQ tenderness  EXTREMITIES:  Trace edema, no clubbing  SKIN:  Warm, pink, dry  NEURO: AOx4, no focal deficits    LABORATORY DATA:    Lab Results   Component Value Date/Time    WBC 8.9 06/22/2018 04:36 AM    RBC 2.74 (L) 06/22/2018 04:36 AM    HEMOGLOBIN 8.3 (L) 06/22/2018 04:36 AM    HEMATOCRIT 25.4 (L) 06/22/2018 04:36 AM    MCV 92.7 06/22/2018 04:36 AM    MCH 30.3 06/22/2018 04:36 AM    " MCHC 32.7 (L) 06/22/2018 04:36 AM    MPV 9.9 06/22/2018 04:36 AM    NEUTSPOLYS 74.60 (H) 06/21/2018 08:44 PM    LYMPHOCYTES 14.60 (L) 06/21/2018 08:44 PM    MONOCYTES 7.50 06/21/2018 08:44 PM    EOSINOPHILS 0.10 06/21/2018 08:44 PM    BASOPHILS 0.40 06/21/2018 08:44 PM    HYPOCHROMIA 1+ 06/21/2018 08:44 PM    ANISOCYTOSIS 1+ 06/21/2018 08:44 PM      Lab Results   Component Value Date/Time    SODIUM 136 06/22/2018 04:36 AM    POTASSIUM 6.0 (H) 06/22/2018 04:36 AM    CHLORIDE 115 (H) 06/22/2018 04:36 AM    CO2 15 (L) 06/22/2018 04:36 AM    GLUCOSE 86 06/22/2018 04:36 AM    BUN 67 (H) 06/22/2018 04:36 AM    CREATININE 3.48 (H) 06/22/2018 04:36 AM      Lab Results   Component Value Date/Time    PROTHROMBTM 14.1 06/21/2018 07:55 PM    INR 1.12 06/21/2018 07:55 PM         IMAGING:   CXR (personally reviewed)  US-RENAL   Final Result      Redemonstration of large hematoma anterior and lateral to the left kidney.      Moderate pelvic ascites. Trace perihepatic ascites.      CT-ABDOMEN-PELVIS W/O   Final Result         1. Large left perinephric space and left retroperitoneal hemorrhage and hematoma extending to the left lower quadrant and pelvis..      2. Large pericardial effusion. Small right pleural effusion.      3. Diffuse anasarca, abdominal and pelvic ascites.      CRITICAL RESULT READ BACK: Preliminary findings discussed with and critical read back performed by Dr. BIMAL SANTACRUZ in the Emergency Department via telephone on 6/21/2018 10:04 PM        ASSESSMENT/PLAN:  Post-procedural perinephric hematoma   - large, resulting in worsening anemia   - s/p PRBC transfusion   - currently hemodynamically stable   - ICU admission   - close cardiac monitoring   - NPO until stable   - IR aware, consider Urology consult for any worsening or signs of worsening renal impairment   - f/u US renal in AM to ensure no subsequent obstructive uropathy   - serial H&H and closely monitor renal function   - TEG    Blood loss anemia   - s/p  PRBC transfusion   - trend H&H   - transfuse for Hg <7    Large pericardial effusion   - per patient she has a hx of effusion, unknown hx of size   - Echocardiogram   - consider cardiology consult or surgical consult if unstable    CKD   - IVF   - renal dose meds, avoid nephrotoxins    Anasarca and ascites   - likely related to her CKD   - will hold diuresis for now    Incomplete medical record   - update as able    Prophylaxis: SCDs    Patient is critically ill at this time.  I have spent 36 minutes examining this patient, all lab data, x-ray, and discussion with RN, RT, [facility] resident. Critical care time: 36 min. No time overlap. Procedures not included in time. Thank you for asking me to consult on the patient.  I appreciate the opportunity to assist in their care and will follow along closely with you.    James Wu, DO  Critical Care Medicine    Please note that this dictation was created using voice recognition software. The accuracy of the dictation is limited to the abilities of the software.I have made every reasonable attempt to correct obvious errors, but I expect that there are errors of grammar and possibly content that I did not discover before finalizing the note.

## 2018-06-22 NOTE — PROGRESS NOTES
IR Procedure Note:    Patient arrived to IR2 with ICU RN and transport.  Patient A&Ox4, on room air, and in no apparent distress.  Patient arrived with RBC's hanging.  Patient consented for procedure, all questions answered.  Dr. Childs performed non-tunneled hemodialysis catheter placement and visceral angiogram.  Right femoral artery accessed for visceral angiogram, angioseal closure device in place.  Gauze and tegaderm to access site, CDI.  Right internal jugular accessed for temporary hemodialysis catheter.  The patient tolerated the procedure fairly well; ETCo2 baseline 29, with consistent waveform during the procedure.   Patient hypertensive at start of case, however appeared to be sleeping.  Patient medicated for elevated BP.  Bedside report given to BAYLEE Mari and patient transported to RICU with two RN.    Angioseal VIP 6F  REF [id number]  LOT [id number]  (right femoral artery)    Bard Power-Trialysis Slim-Cath Short-Term Dialysis Catheter 12F 15cm  REF [id number]  LOT [id number]

## 2018-06-22 NOTE — PROGRESS NOTES
[facility] ICU Progress Note      Admit Date: 6/21/2018  ICU Day:  0      Resident(s): Sheridan Vasquez  Attending: SHELLIE HARDY/ Dr. Barcenas     Date & Time:   6/22/2018   11:14 AM       Patient ID:    Name:             Mendy Solano   YOB: 1990  Age:                 28 y.o.  female   MRN:               [id number]    HPI:  Mendy Solano is a 28 year old female with a history of lupus and lupus nephritis who presents S/P renal biopsy 6/21 with abdominal pain secondary to large perinephric and retroperitoneal hemorrhage and hematoma extending to the left lower quadrant and pelvis. CT also showed a large pericardial effusion.     Consultants:  PMA: Dr. Barcenas.     Procedures:  None     Imaging:  US-RENAL   Final Result      Redemonstration of large hematoma anterior and lateral to the left kidney.      Moderate pelvic ascites. Trace perihepatic ascites.      CT-ABDOMEN-PELVIS W/O   Final Result         1. Large left perinephric space and left retroperitoneal hemorrhage and hematoma extending to the left lower quadrant and pelvis..      2. Large pericardial effusion. Small right pleural effusion.      3. Diffuse anasarca, abdominal and pelvic ascites.      CRITICAL RESULT READ BACK: Preliminary findings discussed with and critical read back performed by Dr. BIMAL SANTACRUZ in the Emergency Department via telephone on 6/21/2018 10:04 PM      ECHOCARDIOGRAM COMP W/O CONT    (Results Pending)       Interval Events:  - -110  - -140/85-97  - UOP borderline; making urine   - Renal: Cr 3.48, K 6 S/P lasix, BMP repeat for hyperkalemia  - CV: Tachycardia likely 2/2 anemia   - Heme: Q6H CBC and if stable reduce frequency     Review of Systems   Constitutional: Negative for chills and fever.   HENT: Negative for congestion and nosebleeds.    Respiratory: Negative for sputum production and shortness of breath.    Cardiovascular: Negative for chest pain and palpitations.   Gastrointestinal: Positive for  abdominal pain, nausea and vomiting. Negative for blood in stool and constipation.   Genitourinary: Positive for flank pain. Negative for dysuria, frequency, hematuria and urgency.   Musculoskeletal: Positive for back pain.   Skin: Negative for itching and rash.   Neurological: Positive for headaches. Negative for dizziness and weakness.   Endo/Heme/Allergies: Negative.    Psychiatric/Behavioral: Negative for depression and suicidal ideas.       PHYSICAL EXAM  Gen: NAD  HEENT: NC/AT, no scleral icterus, no conjunctival injection, mucous membranes pink and moist.  Neck: Supple, no lymphadenopathy.  Cardiac: S1S2, RRR, no m/r/g, no JVD.  Respiratory: Normal effort, symmetrical, CTA b/l.  Abdomen: BS+, soft, NT/ND, no rebound/guarding, no palpable organomegaly.  Ext: No edema, 2+ DP pulses b/l.  Skin: Warm, dry. No rashes or erythema.   Neuro: AAOx4, CN II-XII grossly normal, no focal sensory or motor deficits.   Psych: Affect, mood, judgement normal.    Respiratory:     Respiration: 18, Pulse Oximetry: 96 %, O2 Daily Delivery Respiratory : Room Air with O2 Available    Chest Tube Drains:  None     HemoDynamics:  Pulse: (!) 105, Heart Rate (Monitored): (!) 105 Blood Pressure: 129/88, NIBP: 149/95      Neuro:  None    Fluids:    Intake/Output Summary (Last 24 hours) at 06/22/18 0752  Last data filed at 06/22/18 0212   Gross per 24 hour   Intake              412 ml   Output                0 ml   Net              412 ml       Weight: 73.7 kg (162 lb 7.7 oz)  Body mass index is 27.04 kg/m².    Recent Labs      06/21/18 1955 06/22/18 0436   SODIUM  138  136   POTASSIUM  5.1  6.0*   CHLORIDE  114*  115*   CO2  16*  15*   BUN  69*  67*   CREATININE  3.78*  3.48*   MAGNESIUM   --   2.2   PHOSPHORUS   --   8.4*   CALCIUM  7.7*  7.8*       GI/Nutrition:  Recent Labs      06/21/18 1955 06/22/18 0436   ALTSGPT  5   --    ASTSGOT  8*   --    ALKPHOSPHAT  30   --    TBILIRUBIN  0.3   --    GLUCOSE  117*  86        Heme:  Recent Labs      18   1345  18   RBC   --   2.34*  2.23*  2.74*   HEMOGLOBIN   --   6.8*  6.4*  8.3*   HEMATOCRIT   --   22.3*  22.0*  25.4*   PLATELETCT  251  263  227  183   PROTHROMBTM  13.1  14.1   --    --    INR  1.02  1.12   --    --        Infectious Disease:  Temp  Av.9 °C (98.5 °F)  Min: 36.2 °C (97.1 °F)  Max: 37.5 °C (99.5 °F)  Recent Labs      18   WBC  12.1*  11.2*  8.9   NEUTSPOLYS  83.00*  74.60*   --    LYMPHOCYTES  9.80*  14.60*   --    MONOCYTES  5.40  7.50   --    EOSINOPHILS  0.00  0.10   --    BASOPHILS  0.00  0.40   --    ASTSGOT  8*   --    --    ALTSGPT  5   --    --    ALKPHOSPHAT  30   --    --    TBILIRUBIN  0.3   --    --        Meds:  • HYDROcodone-acetaminophen  1 Tab     • hydroxychloroquine  200 mg     • predniSONE  60 mg     • mycophenolate  500 mg     • senna-docusate  2 Tab      And   • polyethylene glycol/lytes  1 Packet      And   • magnesium hydroxide  30 mL      And   • bisacodyl  10 mg     • ondansetron  4 mg     • ondansetron  4 mg     • promethazine  12.5-25 mg     • promethazine  12.5-25 mg     • prochlorperazine  5-10 mg     • acetaminophen  650 mg     • glucose 4 g  16 g      And   • dextrose 50%  25 mL     • cefTRIAXone (ROCEPHIN) IV  2 g Stopped (18 08)   • labetalol  10 mg     • LORazepam              Problem and Plan:    Asymptomatic bacteriuria  UA notable for bacteria, wbc, rbc   Ceftriaxone given immunosuppressed  C/W lupus nephritis     Acute on Chronic Anemia due to traumatic retroperitoneal hematoma S/P renal biopsy   Follow H&H as well as electrolytes every 6 hours and replete as necessary  Goal Hgb > 8 in the setting of acute bleed   S/P 2u pRBC in ED for Hgb 6.8  TEG consistent with hypercoaguable state in the setting of lupus  SCDs only in the setting of acute bleed  NPO, advance depending on clinical status     Lupus nephritis  (HCC)  Mycophenate motifil, Hydroxychloroquine, Prednisone   S/P Renal biopsy 6/21 with hematoma     Hyperkalemia  Potassium 6 with renal failure  S/P lasix     Hyperphosphatemia  With renal failure  Continue to monitor  If persistently >6; will add Sevelamir     Anemia of chronic disease/autoimmune   Hold on iron studies as she has had recent blood transfusion   Will need ferritin, iron studies, reticulocyte count    Pericardial effusion  Asymptomatic, no acute EKG changes    Renal anasarca  S/P fluids and PRBCs in the setting of renal failure   Lasix IV and monitor clinical status   Strict I&O      Quality Measures:  Lines: PIV      Desai Catheter: None    DVT Prophylaxis: SCDs    Ulcer Prophylaxis: Famotidine     Antibiotics: Ceftriaxone        CODE STATUS: Full     DISPO: Intensive Care Unit

## 2018-06-22 NOTE — ED NOTES
Med rec updated per pt at bedside  Allergies reviewed - NKDA  No ABX in last month  Pt is unsure of some of her medications/dosages  Will follow up with pharmacy in the morning  Pt reports that she has not had Cellcept in around a week because her doctor is supposed to be changing it

## 2018-06-22 NOTE — ASSESSMENT & PLAN NOTE
Stable   Transfuse Hgb <7   S/P 3u pRBC in ED for Hgb 6.8 on admission   TEG consistent with hypercoaguable state in the setting of lupus  SCDs only in the setting of acute bleed  Left renal angiography without active extravasation

## 2018-06-22 NOTE — PROGRESS NOTES
IR called regarding potential for patient to return to IR based on renal U/S. IR RN to notify Dr. Conde of patient's status in the ICU s/p renal biopsy on 6/21. IR to notify Dr. Barcenas.

## 2018-06-22 NOTE — OR SURGEON
Immediate Post- Operative Note        PostOp Diagnosis: RENAL FAILURE, LUPUS, LEFT PERINEPHRIC HEMORRHAGE AFTER RENAL BX YESTERDAY.      Procedure(s):  1) RIGHT INTERNAL JUGULAR NON TUNNELED TEMPORARY 12.5F MAHURKAR PLACEMENT WITH U/S AND FLUOROSCOPIC GUIDANCE    2) R CFA PUNCTURE, LEFT RENAL ANGIOGRAM, R CFA 6F ANGIOSEAL    NO EVIDENCE OF ACTIVE BLEED, PSEUDOANEURYSM, AV FISTULA OR OTHER VESSEL INJURY. NO EMBOLIZATION INDICATED.       Estimated Blood Loss: <5CC (FLUSHES)        Complications: NONE            6/22/2018     3:59 PM     Bc Childs

## 2018-06-22 NOTE — ASSESSMENT & PLAN NOTE
S/P fluids and PRBCs in the setting of renal failure   Strict I&O  2L Fluid restriction   Dialysis PRN, see Renal Failure for plan

## 2018-06-23 PROBLEM — D64.9 ANEMIA: Chronic | Status: ACTIVE | Noted: 2018-06-22

## 2018-06-23 PROBLEM — I10 HYPERTENSION: Status: ACTIVE | Noted: 2018-06-23

## 2018-06-23 PROBLEM — N17.9 ACUTE RENAL FAILURE (HCC): Status: ACTIVE | Noted: 2018-06-22

## 2018-06-23 PROBLEM — E87.5 HYPERKALEMIA: Status: RESOLVED | Noted: 2018-06-22 | Resolved: 2018-06-23

## 2018-06-23 PROBLEM — M32.14 SYSTEMIC LUPUS ERYTHEMATOSUS WITH GLOMERULAR DISEASE (HCC): Chronic | Status: ACTIVE | Noted: 2018-06-23

## 2018-06-23 LAB
ANION GAP SERPL CALC-SCNC: 9 MMOL/L (ref 0–11.9)
BUN SERPL-MCNC: 68 MG/DL (ref 8–22)
CALCIUM SERPL-MCNC: 8.2 MG/DL (ref 8.5–10.5)
CHLORIDE SERPL-SCNC: 114 MMOL/L (ref 96–112)
CO2 SERPL-SCNC: 18 MMOL/L (ref 20–33)
CREAT SERPL-MCNC: 3.44 MG/DL (ref 0.5–1.4)
ERYTHROCYTE [DISTWIDTH] IN BLOOD BY AUTOMATED COUNT: 52.5 FL (ref 35.9–50)
FERRITIN SERPL-MCNC: 339.1 NG/ML (ref 10–291)
FOLATE SERPL-MCNC: 12.9 NG/ML
GLUCOSE SERPL-MCNC: 108 MG/DL (ref 65–99)
HAV IGM SERPL QL IA: NEGATIVE
HBV CORE IGM SER QL: NEGATIVE
HBV SURFACE AG SER QL: NEGATIVE
HCT VFR BLD AUTO: 24.3 % (ref 37–47)
HCV AB SER QL: NEGATIVE
HGB BLD-MCNC: 7.9 G/DL (ref 12–16)
HGB RETIC QN AUTO: 32.9 PG/CELL (ref 29–35)
IMM RETICS NFR: 28.7 % (ref 9.3–17.4)
IRON SATN MFR SERPL: 38 % (ref 15–55)
IRON SERPL-MCNC: 77 UG/DL (ref 40–170)
MAGNESIUM SERPL-MCNC: 2.1 MG/DL (ref 1.5–2.5)
MAGNESIUM SERPL-MCNC: 2.1 MG/DL (ref 1.5–2.5)
MCH RBC QN AUTO: 29.2 PG (ref 27–33)
MCHC RBC AUTO-ENTMCNC: 32.5 G/DL (ref 33.6–35)
MCV RBC AUTO: 89.7 FL (ref 81.4–97.8)
PHOSPHATE SERPL-MCNC: 8.4 MG/DL (ref 2.5–4.5)
PHOSPHATE SERPL-MCNC: 8.6 MG/DL (ref 2.5–4.5)
PLATELET # BLD AUTO: 182 K/UL (ref 164–446)
PMV BLD AUTO: 10.5 FL (ref 9–12.9)
POTASSIUM SERPL-SCNC: 5 MMOL/L (ref 3.6–5.5)
RBC # BLD AUTO: 2.71 M/UL (ref 4.2–5.4)
RETICS # AUTO: 0.08 M/UL (ref 0.04–0.06)
RETICS/RBC NFR: 3 % (ref 0.8–2.1)
SODIUM SERPL-SCNC: 141 MMOL/L (ref 135–145)
TIBC SERPL-MCNC: 204 UG/DL (ref 250–450)
VIT B12 SERPL-MCNC: 173 PG/ML (ref 211–911)
WBC # BLD AUTO: 11.4 K/UL (ref 4.8–10.8)

## 2018-06-23 PROCEDURE — 700111 HCHG RX REV CODE 636 W/ 250 OVERRIDE (IP): Performed by: INTERNAL MEDICINE

## 2018-06-23 PROCEDURE — 84100 ASSAY OF PHOSPHORUS: CPT

## 2018-06-23 PROCEDURE — 700102 HCHG RX REV CODE 250 W/ 637 OVERRIDE(OP): Performed by: INTERNAL MEDICINE

## 2018-06-23 PROCEDURE — 770022 HCHG ROOM/CARE - ICU (200)

## 2018-06-23 PROCEDURE — A9270 NON-COVERED ITEM OR SERVICE: HCPCS | Performed by: HOSPITALIST

## 2018-06-23 PROCEDURE — A9270 NON-COVERED ITEM OR SERVICE: HCPCS | Performed by: INTERNAL MEDICINE

## 2018-06-23 PROCEDURE — 700102 HCHG RX REV CODE 250 W/ 637 OVERRIDE(OP): Performed by: STUDENT IN AN ORGANIZED HEALTH CARE EDUCATION/TRAINING PROGRAM

## 2018-06-23 PROCEDURE — 700105 HCHG RX REV CODE 258: Performed by: INTERNAL MEDICINE

## 2018-06-23 PROCEDURE — 82746 ASSAY OF FOLIC ACID SERUM: CPT

## 2018-06-23 PROCEDURE — 80048 BASIC METABOLIC PNL TOTAL CA: CPT

## 2018-06-23 PROCEDURE — 700102 HCHG RX REV CODE 250 W/ 637 OVERRIDE(OP): Performed by: HOSPITALIST

## 2018-06-23 PROCEDURE — 82306 VITAMIN D 25 HYDROXY: CPT

## 2018-06-23 PROCEDURE — A9270 NON-COVERED ITEM OR SERVICE: HCPCS | Performed by: STUDENT IN AN ORGANIZED HEALTH CARE EDUCATION/TRAINING PROGRAM

## 2018-06-23 PROCEDURE — 80074 ACUTE HEPATITIS PANEL: CPT

## 2018-06-23 PROCEDURE — 700111 HCHG RX REV CODE 636 W/ 250 OVERRIDE (IP): Performed by: HOSPITALIST

## 2018-06-23 PROCEDURE — 83550 IRON BINDING TEST: CPT

## 2018-06-23 PROCEDURE — 99233 SBSQ HOSP IP/OBS HIGH 50: CPT | Performed by: INTERNAL MEDICINE

## 2018-06-23 PROCEDURE — 83735 ASSAY OF MAGNESIUM: CPT

## 2018-06-23 PROCEDURE — 85027 COMPLETE CBC AUTOMATED: CPT

## 2018-06-23 PROCEDURE — 82607 VITAMIN B-12: CPT

## 2018-06-23 PROCEDURE — 83540 ASSAY OF IRON: CPT

## 2018-06-23 PROCEDURE — 82728 ASSAY OF FERRITIN: CPT

## 2018-06-23 PROCEDURE — 85046 RETICYTE/HGB CONCENTRATE: CPT

## 2018-06-23 RX ORDER — DIPHENHYDRAMINE HYDROCHLORIDE 50 MG/ML
25 INJECTION INTRAMUSCULAR; INTRAVENOUS
Status: DISCONTINUED | OUTPATIENT
Start: 2018-06-24 | End: 2018-06-29 | Stop reason: HOSPADM

## 2018-06-23 RX ORDER — LABETALOL 200 MG/1
200 TABLET, FILM COATED ORAL TWICE DAILY
Status: DISCONTINUED | OUTPATIENT
Start: 2018-06-23 | End: 2018-06-24

## 2018-06-23 RX ORDER — ACETAMINOPHEN 325 MG/1
650 TABLET ORAL
Status: COMPLETED | OUTPATIENT
Start: 2018-06-24 | End: 2018-06-25

## 2018-06-23 RX ORDER — SEVELAMER CARBONATE 800 MG/1
800 TABLET, FILM COATED ORAL
Status: DISCONTINUED | OUTPATIENT
Start: 2018-06-23 | End: 2018-06-29 | Stop reason: HOSPADM

## 2018-06-23 RX ORDER — ACETAMINOPHEN 325 MG/1
650 TABLET ORAL ONCE
Status: COMPLETED | OUTPATIENT
Start: 2018-06-24 | End: 2018-06-24

## 2018-06-23 RX ORDER — DIPHENHYDRAMINE HYDROCHLORIDE 50 MG/ML
25 INJECTION INTRAMUSCULAR; INTRAVENOUS ONCE
Status: COMPLETED | OUTPATIENT
Start: 2018-06-24 | End: 2018-06-24

## 2018-06-23 RX ORDER — MEPERIDINE HYDROCHLORIDE 50 MG/ML
25 INJECTION INTRAMUSCULAR; INTRAVENOUS; SUBCUTANEOUS
Status: DISCONTINUED | OUTPATIENT
Start: 2018-06-24 | End: 2018-06-29 | Stop reason: HOSPADM

## 2018-06-23 RX ADMIN — LABETALOL HYDROCHLORIDE 20 MG: 5 INJECTION, SOLUTION INTRAVENOUS at 19:47

## 2018-06-23 RX ADMIN — MYCOPHENOLATE MOFETIL 500 MG: 250 CAPSULE ORAL at 09:15

## 2018-06-23 RX ADMIN — HYDROXYCHLOROQUINE SULFATE 200 MG: 200 TABLET, FILM COATED ORAL at 09:16

## 2018-06-23 RX ADMIN — ERYTHROPOIETIN 10000 UNITS: 10000 INJECTION, SOLUTION INTRAVENOUS; SUBCUTANEOUS at 12:12

## 2018-06-23 RX ADMIN — SEVELAMER CARBONATE 800 MG: 800 TABLET, FILM COATED ORAL at 12:07

## 2018-06-23 RX ADMIN — HYDRALAZINE HYDROCHLORIDE 10 MG: 20 INJECTION INTRAMUSCULAR; INTRAVENOUS at 00:10

## 2018-06-23 RX ADMIN — HYDROXYCHLOROQUINE SULFATE 200 MG: 200 TABLET, FILM COATED ORAL at 20:26

## 2018-06-23 RX ADMIN — HYDRALAZINE HYDROCHLORIDE 20 MG: 20 INJECTION INTRAMUSCULAR; INTRAVENOUS at 05:29

## 2018-06-23 RX ADMIN — HYDRALAZINE HYDROCHLORIDE 10 MG: 20 INJECTION INTRAMUSCULAR; INTRAVENOUS at 04:18

## 2018-06-23 RX ADMIN — LABETALOL HYDROCHLORIDE 20 MG: 5 INJECTION, SOLUTION INTRAVENOUS at 03:05

## 2018-06-23 RX ADMIN — SEVELAMER CARBONATE 800 MG: 800 TABLET, FILM COATED ORAL at 09:15

## 2018-06-23 RX ADMIN — ACETAMINOPHEN 650 MG: 325 TABLET, FILM COATED ORAL at 12:04

## 2018-06-23 RX ADMIN — PROMETHAZINE HYDROCHLORIDE 12.5 MG: 25 SUPPOSITORY RECTAL at 03:32

## 2018-06-23 RX ADMIN — SODIUM BICARBONATE: 84 INJECTION, SOLUTION INTRAVENOUS at 05:07

## 2018-06-23 RX ADMIN — ONDANSETRON 4 MG: 2 INJECTION INTRAMUSCULAR; INTRAVENOUS at 02:30

## 2018-06-23 RX ADMIN — HYDROCODONE BITARTRATE AND ACETAMINOPHEN 1 TABLET: 5; 325 TABLET ORAL at 19:46

## 2018-06-23 RX ADMIN — LABETALOL HCL 200 MG: 200 TABLET, FILM COATED ORAL at 20:27

## 2018-06-23 RX ADMIN — CEFTRIAXONE 2 G: 2 INJECTION, POWDER, FOR SOLUTION INTRAMUSCULAR; INTRAVENOUS at 09:16

## 2018-06-23 RX ADMIN — LABETALOL HCL 200 MG: 200 TABLET, FILM COATED ORAL at 09:15

## 2018-06-23 RX ADMIN — PREDNISONE 60 MG: 20 TABLET ORAL at 09:15

## 2018-06-23 RX ADMIN — SEVELAMER CARBONATE 800 MG: 800 TABLET, FILM COATED ORAL at 18:04

## 2018-06-23 RX ADMIN — LABETALOL HYDROCHLORIDE 20 MG: 5 INJECTION, SOLUTION INTRAVENOUS at 10:05

## 2018-06-23 RX ADMIN — HYDROCODONE BITARTRATE AND ACETAMINOPHEN 1 TABLET: 5; 325 TABLET ORAL at 04:57

## 2018-06-23 ASSESSMENT — ENCOUNTER SYMPTOMS
NAUSEA: 1
BACK PAIN: 1
RESPIRATORY NEGATIVE: 1
SHORTNESS OF BREATH: 0
FLANK PAIN: 1
VOMITING: 0
EYES NEGATIVE: 1
WEAKNESS: 1
FEVER: 0
DEPRESSION: 0
DIZZINESS: 0
CHILLS: 0
NAUSEA: 0
MYALGIAS: 1
FOCAL WEAKNESS: 0
ABDOMINAL PAIN: 1
SPUTUM PRODUCTION: 0
BLURRED VISION: 0
DOUBLE VISION: 0
HEADACHES: 1
CARDIOVASCULAR NEGATIVE: 1
BLOOD IN STOOL: 0
PSYCHIATRIC NEGATIVE: 1
BRUISES/BLEEDS EASILY: 1
PALPITATIONS: 0
WEAKNESS: 0

## 2018-06-23 ASSESSMENT — PAIN SCALES - GENERAL
PAINLEVEL_OUTOF10: 1
PAINLEVEL_OUTOF10: 3
PAINLEVEL_OUTOF10: 7
PAINLEVEL_OUTOF10: 2
PAINLEVEL_OUTOF10: 7
PAINLEVEL_OUTOF10: 7
PAINLEVEL_OUTOF10: 2
PAINLEVEL_OUTOF10: 7
PAINLEVEL_OUTOF10: 7
PAINLEVEL_OUTOF10: 4
PAINLEVEL_OUTOF10: 1
PAINLEVEL_OUTOF10: 2
PAINLEVEL_OUTOF10: 2

## 2018-06-23 ASSESSMENT — PATIENT HEALTH QUESTIONNAIRE - PHQ9
2. FEELING DOWN, DEPRESSED, IRRITABLE, OR HOPELESS: NOT AT ALL
SUM OF ALL RESPONSES TO PHQ9 QUESTIONS 1 AND 2: 0
1. LITTLE INTEREST OR PLEASURE IN DOING THINGS: NOT AT ALL

## 2018-06-23 ASSESSMENT — LIFESTYLE VARIABLES: ALCOHOL_USE: NO

## 2018-06-23 NOTE — PROGRESS NOTES
Pt transported from IR to R-[id number] via ACLS ICU RN and IR RN, on monitor. VSS, no complications, pt resting comfortably in bed. Family at bedside.

## 2018-06-23 NOTE — PROGRESS NOTES
0628- Page sent out for Dr. Servin.    0647- Second page sent out for Dr. Servin.    0649- Page returned from Dr. Servin. Dr. Servin updated on patient's hypertension despite PRNs. New order placed by Dr. Barcenas.

## 2018-06-23 NOTE — PROGRESS NOTES
[facility] Progress Note      Admit Date: 6/21/2018  ICU Day:  1      Resident(s): Sheridan Vasquez  Attending: SHELLIE HARDY/ Dr. Barcenas     Date & Time:   6/23/2018   11:42 AM       Patient ID:    Name:             Mendy Solano   YOB: 1990  Age:                 28 y.o.  female   MRN:               [id number]    HPI:  Mendy Solano is a 28 year old female with a history of lupus/lupus nephritis who presents S/P renal biopsy 6/21 with abdominal pain secondary to large perinephric and retroperitoneal hemorrhage and hematoma extending to the left lower quadrant and pelvis. CT also shows a large pericardial effusion in the setting of systemic lupus. Nephrology placed tunneled catheter for dialysis indication being renal failure with fluid overload. She remains hemodynamically stable at this time. Arteriogram 6/22 did not show active extravasation.     Consultants:  PMA: Dr. Barcenas.   Nephrology: Dr. Gar.     Procedures:  Arteriogram 6/22 No active extravasation   Hemodialysis catheter 6/22    Imaging:  IR-EMBOLIZATION   Final Result      1.  Left renal angiography showing no evidence of active extravasation, pseudoaneurysm, AV fistula, or other evidence of vascular injury. No coil embolization was indicated.                  INTERPRETING LOCATION: [address] [place] [address], [facility]      IR-CVC NON TUNNELED > AGE 4   Final Result      1. ULTRASOUND AND FLUOROSCOPIC GUIDED PLACEMENT OF A RIGHT INTERNAL JUGULAR 12 [place] TRIALYSIS TRIPLE LUMEN NON-TUNNELED HEMODIALYSIS CATHETER.      2. THE HEMODIALYSIS CATHETER MAY BE USED IMMEDIATELY AS CLINICALLY INDICATED. FLUSHES PER PROTOCOL.      US-RENAL   Final Result      Redemonstration of large hematoma anterior and lateral to the left kidney.      Moderate pelvic ascites. Trace perihepatic ascites.      CT-ABDOMEN-PELVIS W/O   Final Result         1. Large left perinephric space and left retroperitoneal hemorrhage and hematoma extending to the left lower  quadrant and pelvis..      2. Large pericardial effusion. Small right pleural effusion.      3. Diffuse anasarca, abdominal and pelvic ascites.      CRITICAL RESULT READ BACK: Preliminary findings discussed with and critical read back performed by Dr. BIMAL SANTACRUZ in the Emergency Department via telephone on 6/21/2018 10:04 PM      ECHOCARDIOGRAM COMP W/O CONT    (Results Pending)       Interval Events:  - HR   - -166/85-95  - T max 100.3  - Renal: RIJ placed for dialysis oliguric acute renal failure with fluid overload; Sevelamir for hyperphosphatemia   - CV: Tachycardia likely 2/2 anemia; Persistently hypertensive now on scheduled labetalol with PRN BP goal < 150/90  - Heme: Hgb stable; S/P 3u PRBC on admission; Left renal angiography without active extravasation  - ID: Ceftriaxone for 5 days for UTI  - Electrolytes: Hyperkalemia resolved S/P lasix, insulin, bicarbonate and kayexalate     Review of Systems   Constitutional: Negative for chills and fever.   HENT: Negative for congestion and nosebleeds.    Respiratory: Negative for sputum production and shortness of breath.    Cardiovascular: Negative for chest pain and palpitations.   Gastrointestinal: Positive for abdominal pain and nausea. Negative for blood in stool and melena.   Genitourinary: Positive for flank pain. Negative for dysuria, frequency, hematuria and urgency.   Musculoskeletal: Positive for back pain.   Skin: Negative for itching and rash.   Neurological: Positive for headaches. Negative for dizziness and weakness.   Endo/Heme/Allergies: Negative.    Psychiatric/Behavioral: Negative for depression and suicidal ideas.       PHYSICAL EXAM  Gen: NAD.   HEENT: NC/AT, no scleral icterus, no conjunctival injection, mucous membranes pink and moist.  Neck: Supple, no tracheal deviation.  Cardiac: S1S2, no m/r/g, JVD to jaw line.   Respiratory: Normal effort, symmetrical, CTA b/l.  Abdomen: BS+, soft, Anasarca, NT/ND, no rebound/guarding, no  palpable organomegaly.  Ext: Trace edema b/l, 2+ DP pulses b/l.  Skin: Warm, dry. No rashes or erythema.   Neuro: AAOx4, CN II-XII grossly normal, no focal sensory or motor deficits.   Psych: Affect, mood, judgement normal.    Respiratory:     Respiration: 20, Pulse Oximetry: 95 %    Chest Tube Drains:  None     HemoDynamics:  Pulse: (!) 111, Heart Rate (Monitored): (!) 111 Blood Pressure: 160/96, NIBP: 150/88      Neuro:  None    Fluids:    Intake/Output Summary (Last 24 hours) at 06/22/18 0752  Last data filed at 06/22/18 0212   Gross per 24 hour   Intake              412 ml   Output                0 ml   Net              412 ml        Body mass index is 27.04 kg/m².    Recent Labs      06/22/18 1120 06/22/18 1730 06/22/18 2331 06/23/18   0514   SODIUM  137  139   --   141   POTASSIUM  6.2*  5.5   --   5.0   CHLORIDE  116*  114*   --   114*   CO2  15*  15*   --   18*   BUN  69*  69*   --   68*   CREATININE  3.68*  3.76*   --   3.44*   MAGNESIUM  2.2  2.2  2.1  2.1   PHOSPHORUS  8.7*  8.2*  8.4*  8.6*   CALCIUM  8.2*  8.2*   --   8.2*       GI/Nutrition:  Recent Labs      06/21/18 1955 06/22/18 1120 06/22/18 1730 06/23/18   0514   ALTSGPT  5   --    --    --    --    ASTSGOT  8*   --    --    --    --    ALKPHOSPHAT  30   --    --    --    --    TBILIRUBIN  0.3   --    --    --    --    GLUCOSE  117*   < >  94  126*  108*    < > = values in this interval not displayed.       Heme:  Recent Labs      06/20/18   1345  06/21/18 1955 06/22/18 1120 06/22/18 1730 06/23/18   0514  06/23/18   0940   RBC   --   2.34*   < >  2.42*  2.82*  2.71*   --    HEMOGLOBIN   --   6.8*   < >  7.2*  8.6*  7.9*   --    HEMATOCRIT   --   22.3*   < >  22.6*  25.1*  24.3*   --    PLATELETCT  251  263   < >  163*  168  182   --    PROTHROMBTM  13.1  14.1   --    --    --    --    --    INR  1.02  1.12   --    --    --    --    --    IRON   --    --    --    --    --   77   --    FERRITIN   --    --    --    --     --    --   339.1*   TOTIRONBC   --    --    --    --    --   204*   --     < > = values in this interval not displayed.       Infectious Disease:  Monitored Temp 2  Av.9 °C (98.5 °F)  Min: 36.5 °C (97.7 °F)  Max: 37.2 °C (99 °F)  Temp  Av.8 °C (100 °F)  Min: 37.6 °C (99.7 °F)  Max: 37.9 °C (100.3 °F)  Recent Labs      18   1120  18   1730  18   0514   WBC  12.1*  11.2*   < >  7.2  6.1  11.4*   NEUTSPOLYS  83.00*  74.60*   --    --    --    --    LYMPHOCYTES  9.80*  14.60*   --    --    --    --    MONOCYTES  5.40  7.50   --    --    --    --    EOSINOPHILS  0.00  0.10   --    --    --    --    BASOPHILS  0.00  0.40   --    --    --    --    ASTSGOT  8*   --    --    --    --    --    ALTSGPT  5   --    --    --    --    --    ALKPHOSPHAT  30   --    --    --    --    --    TBILIRUBIN  0.3   --    --    --    --    --     < > = values in this interval not displayed.       Meds:  • labetalol  200 mg     • sevelamer carbonate  800 mg     • epoetin callie  10,000 Units     • HYDROcodone-acetaminophen  1 Tab     • hydroxychloroquine  200 mg     • predniSONE  60 mg     • mycophenolate  500 mg     • senna-docusate  2 Tab      And   • polyethylene glycol/lytes  1 Packet      And   • magnesium hydroxide  30 mL      And   • bisacodyl  10 mg     • ondansetron  4 mg     • ondansetron  4 mg     • promethazine  12.5-25 mg     • promethazine  12.5-25 mg     • prochlorperazine  5-10 mg     • acetaminophen  650 mg     • glucose 4 g  16 g      And   • dextrose 50%  25 mL     • cefTRIAXone (ROCEPHIN) IV  2 g Stopped (18 0946)   • labetalol  10-20 mg     • hydrALAZINE  10-20 mg            Problem and Plan:    Asymptomatic bacteriuria  UA notable for bacteria, wbc, rbc   Ceftriaxone given immunosuppressed    Acute on Chronic Anemia due to traumatic retroperitoneal hematoma S/P renal biopsy   Stable   Transfuse Hgb <7   S/P 3u pRBC in ED for Hgb 6.8 on admission   TEG  consistent with hypercoaguable state in the setting of lupus  SCDs only in the setting of acute bleed  Left renal angiography without active extravasation    Acute renal failure secondary to lupus nephritis   S/P Renal biopsy 6/21 consistent with lupus nephritis   Renal failure with Creatinine 3.4 with fluid overload and acidosis   Bicarbonate for acidosis  No IVF given clinically fluid overload  RIJ in place for dialysis PRN   Avoid nephrotoxic agents   Nephrology consult     Hyperkalemia  Resolved  Potassium 6 with renal failure  S/P lasix, insulin, bicarbonate and kayexalate     Hyperphosphatemia  Secondary to renal failure  Sevelamir   Goal phos <6    Anemia of chronic disease/autoimmune   Ferritin, iron studies, reticulocyte count    Pericardial effusion  Asymptomatic, no acute EKG changes  Echocardiogram     Renal anasarca  S/P fluids and PRBCs in the setting of renal failure   Strict I&O  2L Fluid restriction   Dialysis PRN, see Renal Failure for plan     Systemic lupus erythematosus with glomerular disease (HCC)  Mycophenate motifil, Hydroxychloroquine, Prednisone   Glomerular disease with lupus nephritis  Pericardial effusion with hemodynamically stability     Hypertension  Appropriately to maintain cardiac output in the setting of anemia   BP goal < 150/90   Labetolol BID   Hydralazine, Labetolol PRN       Quality Measures:  Lines: PIV, RIJ       Desai Catheter: None    DVT Prophylaxis: SCDs; Anemia     Ulcer Prophylaxis: Famotidine for steroids     Antibiotics: Ceftriaxone        CODE STATUS: Full     DISPO: Intensive Care Unit

## 2018-06-23 NOTE — PROGRESS NOTES
"Pharmacy chemotherapy Verification:    Mendy Solano    Protocol: Rituximab       *Dosing Reference*    Rituximab 375 mg/m2 IV on day 1    Weekly x 4 doses    6/23/18: Continue hydroxychloroquine 200 mg po bid and Prednisone 60 mg po qday per MD Soumya MCDOWELL, et al. Clin J Am Soc Nephrol. 2009 Mar; 4(3): 579-587.    Dx: SLE     Allergies: Nkda [no known drug allergy]     /96   Pulse (!) 101   Temp 37.9 °C (100.3 °F)   Resp 20   Ht 1.651 m (5' 5\")   Wt 73.7 kg (162 lb 7.7 oz)   LMP 05/28/2018 Comment: \"IUD in place\"  SpO2 97%   Breastfeeding? No   BMI 27.04 kg/m²     Body surface area is 1.84 meters squared.    LABS 6/23/2018  WBC: 11.4 Plt = 182 k hgb/hct = 7.9/24.3 SCr = 3.44 mg/dL CrCl = HD ml/min         Hepatitis A Virus Ab, IgM Latest Ref Range: Negative  Negative   Hepatitis B Surface Antigen Latest Ref Range: Negative  Negative   Hepatitis B Cors Ab,IgM Latest Ref Range: Negative  Negative   Hepatitis C Antibody Latest Ref Range: Negative  Negative     LABS 6/21/2018  LFTs= 8/5/30 TBili: 0.3     Drug Order   (Drug name, dose, route, IV Fluid & volume, frequency, number of doses) Cycle: 1 Week: 1      Previous treatment: Prednisone, mycophenolate, hydroxychloroquine     Medication = Rituximab (Rituxan)  Base Dose= 375 mg/m2   Calc Dose: Base Dose x Body surface area is 1.84 meters squared. m2 = 690 mg   Final Dose = 700 mg   Route = IV   Fluid & Volume =  mL  Admin Duration = see rate calc sheet           <10% difference, rounded to vial size per protocol, ok to treat with final written dose       By my signature below, I confirm this process was performed independently with the BSA and all final chemotherapy dosing calculations congruent. I have reviewed the above chemotherapy order and that my calculation of the final dose and BSA (when applicable) corroborate those calculations of the  pharmacist. Discrepancies of 5% or greater in the written dose have been addressed " and documented within the EPIC Progress notes.    HILTON Zaragoza, PharmD

## 2018-06-23 NOTE — ASSESSMENT & PLAN NOTE
Mycophenate motifil, Hydroxychloroquine, Prednisone   Glomerular disease with lupus nephritis  Pericardial effusion with hemodynamically stability

## 2018-06-23 NOTE — PROGRESS NOTES
"Pharmacy Chemotherapy calculation:    DX: SLE nephritis     Cycle 1    Week 1   Previous treatment = Prednisone, Mycophenate motefil, Hydroxychloroquine    Regimen: Rituximab  Rituximab 375 mg/m2 IV weekly x 4 doses  6/23/18: Per Dr. Roman Continue hydroxychloroquine 200 mg po bid and Prednisone 60 mg po qday  Soumya MCDOWELL et al. Clin J Am Soc Nephrol. 2009 Mar; 4(3): 579-587.     Allergies: Nkda [no known drug allergy]  /96   Pulse (!) 101   Temp 37.9 °C (100.3 °F)   Resp 20   Ht 1.651 m (5' 5\")   Wt 73.7 kg (162 lb 7.7 oz)   LMP 05/28/2018 Comment: \"IUD in place\"  SpO2 97%   Breastfeeding? No   BMI 27.04 kg/m²   Body surface area is 1.84 meters squared.     Labs 6/23/18:  WBC ~ 11.4k    Plt = 182k   Hgb = 7.9     SCr = 3.44mg/dL CrCl ~ 28 mL/min    Mag = 2.1  K+ = 5  **ok to proceed with rituximab on 6/24 per Dr. Roman**     6/21/18:  AST/ALT/AP = 8/5/30 TBili = 0.3       6/23/2018 09:40   Hepatitis B Surface Antigen Negative   Hepatitis B Cors Ab,IgM Negative         Rituximab 375 mg/m2 x 1.84 m2 = 690 mg   <5% difference, ok to treat with final written dose    Rounded to vial size (within 10%) per MAB dose rounding protocol = 700 mg IV    Tayler Whitlock, PharmD, BCOP         "

## 2018-06-23 NOTE — ASSESSMENT & PLAN NOTE
Appropriately to maintain cardiac output in the setting of anemia   BP goal < 150/90   Labetolol BID   Hydralazine, Labetolol PRN

## 2018-06-23 NOTE — PROGRESS NOTES
AM Bedside report received from NOC RN. Plan of care discussed. Lines labs meds and orders reviewed.

## 2018-06-23 NOTE — CARE PLAN
Problem: Safety  Goal: Will remain free from falls  Outcome: PROGRESSING AS EXPECTED  Bed in lowest and locked position. Call light within reach. Slipper socks in use. Appropriate communication signs in use outside door.    Problem: Bowel/Gastric:  Goal: Will not experience complications related to bowel motility  Outcome: PROGRESSING AS EXPECTED  Patient refused stool softener. Patient had one BM during shift.

## 2018-06-23 NOTE — CARE PLAN
Problem: Knowledge Deficit  Goal: Knowledge of disease process/condition, treatment plan, diagnostic tests, and medications will improve  Pt and Family updated on medical condition, renal status and plan of care by Dr Roman and questions answered by MD and RN.     Problem: Pain Management  Goal: Pain level will decrease to patient's comfort goal  Pt medicated for flank pain and headache pain prn. See MAR.     Problem: Safety  Goal: Free from accidental injury  Pt with bed alarm on and bed locked and in lowest position. Pt with call light next to pt and encouraged to call for all needs.

## 2018-06-23 NOTE — PROGRESS NOTES
Pulmonary Critical Care Progress Note      Admit Date: 6/21/2018    Chief Complaint: perinephric hematoma    History of Present Illness: 28 yof with a PmHx of lupus, HTN and stage 3 CKD. Underwent a left renal biopsy today and since discharge has noted increasing left flank pain and abdominal pain. In the ER was found to have a large perinephric hematoma on CT with a Hg of 6.8 then 6.4. She received 2 units PRBCs in the ER and has remained hemodynamically stable. Her CT also shows a large pericardial effusion. She denies any dizziness, LOC, CP, palpitations, SOB, hematuria or melena/BRBPR. She is to be admitted to the ICU given her significant bleeding and risk for hemodynamic worsening given her effusion and size of hematoma.      Interval Events:  24 hour interval history reviewed   Tm 100.3  -700cc over last 24hr  No cxr this am   Hb 7.9  Cr 3.76->3.44  C02 15->18    Bicarb @ 150cc/hr  IR arteriography was neg for site of bleed     Prednisone 60  Mycophenolate  Plaquenil     Ceftriaxone    Addendum  Renal Bx resulted with worsening dz from lupus.  Nephrology adjusting regimen to rituxan     Review of Systems   Constitutional: Negative for chills and fever.   HENT: Negative for congestion.    Eyes: Negative for blurred vision and double vision.   Respiratory: Negative for sputum production and shortness of breath.    Cardiovascular: Negative for chest pain and palpitations.   Gastrointestinal: Positive for abdominal pain. Negative for blood in stool, melena, nausea and vomiting.   Genitourinary: Negative for dysuria.   Neurological: Negative for focal weakness.   Psychiatric/Behavioral: Negative for depression.   All other systems reviewed and are negative.    Physical Exam   Constitutional: She appears well-developed and well-nourished.   HENT:   Head: Normocephalic and atraumatic.   Eyes: Conjunctivae are normal. Pupils are equal, round, and reactive to light.   Neck: No tracheal deviation present.    Cardiovascular: Regular rhythm.    Tachy rate   Pulmonary/Chest: She has no wheezes.   Diminished throughout   Abdominal: Soft. She exhibits no distension and no mass. There is tenderness. There is no rebound and no guarding.   Musculoskeletal: She exhibits edema.   Neurological: No cranial nerve deficit.   Skin: Skin is warm and dry.   Psychiatric: She has a normal mood and affect.   Nursing note and vitals reviewed.      PFSH:  No change.    Respiratory:     Pulse Oximetry: 95 %  Chest Tube Drains:                  Invalid input(s): [id number]    HemoDynamics:  Pulse: (!) 111, Heart Rate (Monitored): (!) 111  Blood Pressure: 160/96, NIBP: 150/88         Neuro:  GCS             Fluids:  Intake/Output       06/21/18 0700 - 06/22/18 0659 06/22/18 0700 - 06/23/18 0659 06/23/18 0700 - 06/24/18 0659      [id number] 4006-1141 Total 0250-8411 2573-3324 Total 8090-5414 4853-6241 Total       Intake    P.O.  --  -- --  270  -- 270  --  -- --    P.O. -- -- -- 270 -- 270 -- -- --    Blood  --  412 412  350  -- 350  --  -- --    Volume (RELEASE RED BLOOD CELLS-ACTIVE BLEEDING) -- 202 202 -- -- -- -- -- --    Volume (RELEASE RED BLOOD CELLS-ACTIVE BLEEDING) -- 210 210 -- -- -- -- -- --    Volume (RELEASE RED BLOOD CELLS-ACTIVE BLEEDING) -- -- -- 350 -- 350 -- -- --    Total Intake -- 412 412 620 -- 620 -- -- --       Output    Urine  --  -- --  780  542 1322  --  -- --    Number of Times Voided -- -- -- 3 x -- 3 x -- -- --    Urine Void (mL) (non-catheter) -- -- -- 780 -- 780 -- -- --    Output (mL) (Urinary Catheter Indwelling Catheter) -- -- -- -- 542 542 -- -- --    Total Output -- -- --  -- -- --       Net I/O     -- 412 412 -160 -542 -702 -- -- --           Recent Labs      06/22/18   0436  06/22/18   1120  06/22/18   1730  06/22/18   2331  06/23/18   0514   SODIUM  136  137  139   --    --    POTASSIUM  6.0*  6.2*  5.5   --    --    CHLORIDE  115*  116*  114*   --    --    CO2  15*  15*  15*   --    --     BUN  67*  69*  69*   --    --    CREATININE  3.48*  3.68*  3.76*   --    --    MAGNESIUM  2.2  2.2  2.2  2.1  2.1   PHOSPHORUS  8.4*  8.7*  8.2*  8.4*  8.6*   CALCIUM  7.8*  8.2*  8.2*   --    --        GI/Nutrition:    Liver Function  Recent Labs      18   ALTSGPT  5   --    --    --    ASTSGOT  8*   --    --    --    ALKPHOSPHAT  30   --    --    --    TBILIRUBIN  0.3   --    --    --    GLUCOSE  117*  86  94  126*       Heme:  Recent Labs      18   1345  18   0514   RBC   --   2.34*   < >  2.42*  2.82*  2.71*   HEMOGLOBIN   --   6.8*   < >  7.2*  8.6*  7.9*   HEMATOCRIT   --   22.3*   < >  22.6*  25.1*  24.3*   PLATELETCT  251  263   < >  163*  168  182   PROTHROMBTM  13.1  14.1   --    --    --    --    INR  1.02  1.12   --    --    --    --     < > = values in this interval not displayed.       Infectious Disease:  Monitored Temp 2  Av °C (98.6 °F)  Min: 36.5 °C (97.7 °F)  Max: 37.1 °C (98.8 °F)  Temp  Av.7 °C (99.8 °F)  Min: 37.4 °C (99.4 °F)  Max: 37.9 °C (100.3 °F)  Micro: cultures reviewed  Recent Labs      18   0514   WBC  12.1*  11.2*   < >  7.2  6.1  11.4*   NEUTSPOLYS  83.00*  74.60*   --    --    --    --    LYMPHOCYTES  9.80*  14.60*   --    --    --    --    MONOCYTES  5.40  7.50   --    --    --    --    EOSINOPHILS  0.00  0.10   --    --    --    --    BASOPHILS  0.00  0.40   --    --    --    --    ASTSGOT  8*   --    --    --    --    --    ALTSGPT  5   --    --    --    --    --    ALKPHOSPHAT  30   --    --    --    --    --    TBILIRUBIN  0.3   --    --    --    --    --     < > = values in this interval not displayed.     Current Facility-Administered Medications   Medication Dose Frequency Provider Last Rate Last Dose   • HYDROcodone-acetaminophen (NORCO) 5-325 MG per tablet 1 Tab   1 Tab Q6HRS PRN Myah Frias M.D.   1 Tab at 06/23/18 0457   • hydroxychloroquine (PLAQUENIL) tablet 200 mg  200 mg BID Myah Frias M.D.   200 mg at 06/22/18 2125   • predniSONE (DELTASONE) tablet 60 mg  60 mg DAILY Myah Frias M.D.   60 mg at 06/22/18 0802   • mycophenolate (CELLCEPT) capsule 500 mg  500 mg BID Myah Frias M.D.   500 mg at 06/22/18 2127   • senna-docusate (PERICOLACE or SENOKOT S) 8.6-50 MG per tablet 2 Tab  2 Tab BID Myah Frias M.D.   Stopped at 06/22/18 2100    And   • polyethylene glycol/lytes (MIRALAX) PACKET 1 Packet  1 Packet QDAY PRN Myah Frias M.D.        And   • magnesium hydroxide (MILK OF MAGNESIA) suspension 30 mL  30 mL QDAY PRN Myah Frias M.D.        And   • bisacodyl (DULCOLAX) suppository 10 mg  10 mg QDAY PRN Myah Frias M.D.       • ondansetron (ZOFRAN) syringe/vial injection 4 mg  4 mg Q4HRS PRN Myah Frias M.D.   4 mg at 06/23/18 0230   • ondansetron (ZOFRAN ODT) dispertab 4 mg  4 mg Q4HRS PRN Myah Frias M.D.   Stopped at 06/22/18 0400   • promethazine (PHENERGAN) tablet 12.5-25 mg  12.5-25 mg Q4HRS PRN Myah Frias M.D.       • promethazine (PHENERGAN) suppository 12.5-25 mg  12.5-25 mg Q4HRS PRN Myah Frias M.D.   12.5 mg at 06/23/18 0332   • prochlorperazine (COMPAZINE) injection 5-10 mg  5-10 mg Q4HRS PRN Myah Frias M.D.       • acetaminophen (TYLENOL) tablet 650 mg  650 mg Q6HRS PRN Myah Frias M.D.       • glucose 4 g chewable tablet 16 g  16 g Q15 MIN PRN Sheridan Vasquez M.D.        And   • dextrose 50% (D50W) injection 25 mL  25 mL Q15 MIN PRN Sheridan Vasquez M.D.       • cefTRIAXone (ROCEPHIN) 2 g in  mL IVPB  2 g Q24HRS James Wu Jr., D.O.   Stopped at 06/22/18 0827   • sodium bicarbonate 8.4 % 75 mEq in 1/2 NS 1,000 mL infusion   Continuous Andrews Barcenas M.D. 150 mL/hr at 06/23/18 0507     • labetalol (NORMODYNE,TRANDATE) injection 10-20 mg  10-20 mg Q4HRS PRN Andrews Barcenas M.D.   20 mg at 06/23/18 0305   • hydrALAZINE  (APRESOLINE) injection 10-20 mg  10-20 mg Q4HRS PRN Andrews Barcenas M.D.   20 mg at 06/23/18 0529     Last reviewed on 6/22/2018  9:30 AM by Valeria Collins    Quality  Measures:  Radiology images reviewed, Labs reviewed and Medications reviewed                      Assessment/Plan:  Post-procedural perinephric hematoma   - S/P IR Arteriography - no ongoing bleed noted   - Hb holding   - continue to follow and transfuse for Hb <7   - Teg 6/22 reviewed     Blood loss anemia   - continue serial h/h   - transfuse for Hb < 7     Large pericardial effusion              - per patient she has a hx of effusion, unknown hx of size              - F/u Echocardiogram              - consider cardiology consult or surgical consult if unstable     CKD   - appears she had Cr of 2.08 on 5/22/2018 in scanned documents              - renal dose meds, avoid nephrotoxins   - temp hd cath placed 6/22    Hyperkalemia   - improving     Anasarca and ascites              - likely related to her CKD   - will need volume removal eventually     HTN   - add labetalol     Discussed patient condition and risk of morbidity and/or mortality with Family, RN, RT, Therapies and nephrology.

## 2018-06-23 NOTE — CONSULTS
Note Author: Nishant Roman M.D.       Name Mendy Solano     1990   Age/Sex 28 y.o. female   MRN [id number]   Code Status Full Code       Chief Complaint:  AWA on CKD complicated with left perinephric hemorrhage after renal biopsy, acute blood loss requiring transfusion.     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 In the ER she underwent CT scan that showed large perinephric hematoma with a hemoglobin of 6.8 and  large pericardial effusion with generalized/diffuse anasarca, abdominal and pelvic ascites. She remained hemodynamically stable and received total of 3 units of PRBCs since admission and underwent TEG study that showed hypercoagulable pattern with adequate parameters. U/S of the renal showed large hematoma anterior and lateral to the left kidney with moderate pelvic ascites and trace perihepatic ascites. She was also found to have acute kidney injury superimposed on chronic kidney injury complicated with hyperkalemia.    Given her extensive large left perinephric hemorrhage status post biopsy she underwent a renal angiogram of the left kidney that showed no evidence of active bleeding, pseudoaneurysm, AV fistula or other evidence of vascular injury requiring no coil embolization. She did receive contrast during the study.    She was diagnosed with lupus in 2018 and underwent biopsy on 2018 that showed mixed diffuse proliferative membranous lupus nephritis. On 2018 she was noted to have acute worsening of her creatinine and proteinuria and complement levels were low with  "anti-double-stranded DNA levels being high and was started on prednisone 60 mg and CellCept dose was increased and recommended to start mycophenolate mofetil. Given her worsening kidney function she was recommended to get a renal biopsy despite the treatment.    6/23/18 Pt has ongoing abdominal pain, but no hematuria, TEMP HD catheter placed yesterday        Review of Systems   Constitutional: Positive for malaise/fatigue.   HENT: Negative.    Eyes: Negative.    Respiratory: Negative.    Cardiovascular: Negative.    Gastrointestinal: Positive for abdominal pain.   Genitourinary:        Desai   Musculoskeletal: Positive for myalgias.   Skin: Negative.    Neurological: Positive for weakness and headaches.   Endo/Heme/Allergies: Bruises/bleeds easily.   Psychiatric/Behavioral: Negative.              Past Medical History:   Past Medical History:   Diagnosis Date   • Anemia    • Hypertension    • IUD (intrauterine device) in place 06/20/2018   • Lupus    • Renal disorder 06/20/2018    \"Stage 3 kidney disease\"   • Ruptured ectopic pregnancy        Past Surgical History:  Past Surgical History:   Procedure Laterality Date   • PELVISCOPY  8/30/08    Performed by JEREMIAH MENJIVAR at SURGERY [facility] ORS   • LYSIS ADHESIONS GYN  8/30/08    Performed by JEREMIAH MENJIVAR at SURGERY [facility] ORS       Current Outpatient Medications:  Home Medications     Reviewed by Valeria Colilns (Pharmacy Tech) on 06/22/18 at 0930  Med List Status: Complete   Medication Last Dose Status   candesartan (ATACAND) 4 MG Tab  Active   ferrous sulfate 325 (65 Fe) MG tablet  Active   hydrocodone-acetaminophen (NORCO) 5-325 MG Tab per tablet 6/21/2018 Active   hydroxychloroquine (PLAQUENIL) 200 MG Tab 6/20/2018 Active   losartan (COZAAR) 25 MG Tab  Active   mycophenolate sodium (MYFORTIC) 360 MG Tablet Delayed Response tablet week Active   predniSONE (DELTASONE) 20 MG Tab 6/20/2018 Active   vitamin D, Ergocalciferol, (DRISDOL) 29139 units " "Cap capsule  Active                Medication Allergy/Sensitivities:  Allergies   Allergen Reactions   • Nkda [No Known Drug Allergy]          Family History:  Family History   Problem Relation Age of Onset   • Arthritis Mother        Social History:  Social History     Social History   • Marital status: Single     Spouse name: N/A   • Number of children: N/A   • Years of education: N/A     Occupational History   • Not on file.     Social History Main Topics   • Smoking status: Never Smoker   • Smokeless tobacco: Never Used   • Alcohol use No   • Drug use: No   • Sexual activity: Not on file     Other Topics Concern   • Not on file     Social History Narrative   • No narrative on file     Living situation: At home  PCP : Pcp Pt States None        Physical Exam     Vitals:    06/23/18 0400 06/23/18 0457 06/23/18 0500 06/23/18 0600   BP:       Pulse: (!) 103 (!) 104 (!) 108 (!) 111   Resp: 18 (!) 21 (!) 22 20   Temp:       TempSrc:       SpO2: 96% 97% 97% 95%   Weight:       Height:         Body mass index is 27.04 kg/m².  /96   Pulse (!) 111   Temp 37.9 °C (100.3 °F)   Resp 20   Ht 1.651 m (5' 5\")   Wt 73.7 kg (162 lb 7.7 oz)   LMP 05/28/2018 Comment: \"IUD in place\"  SpO2 95%   Breastfeeding? No   BMI 27.04 kg/m²   O2 therapy: Pulse Oximetry: 95 %, O2 (LPM): 0, O2 Delivery: None (Room Air)    Physical Exam   Constitutional: She is oriented to person, place, and time. No distress.   HENT:   Head: Normocephalic and atraumatic.   Hemodialysis catheter in place.    Eyes: No scleral icterus.   Neck:   RIght IJ temp cath   Cardiovascular: Normal rate, regular rhythm, normal heart sounds and intact distal pulses.  Exam reveals no gallop and no friction rub.    No murmur heard.  Tachycardia.    Pulmonary/Chest: Effort normal. No respiratory distress. She has no wheezes.   Decreased breath sounds on the bases.    Abdominal: Soft. She exhibits distension. There is tenderness. There is no rebound and no guarding. "   Hypoactive bowel sounds, distended.  No flank bruising.        Musculoskeletal: Normal range of motion. She exhibits edema. She exhibits no tenderness or deformity.   1+ pitting edema.    Lymphadenopathy:     She has no cervical adenopathy.   Neurological: She is alert and oriented to person, place, and time.   Skin: She is not diaphoretic.             Data Review       Old Records Request:   Completed  Current Records review/summary: Completed    Lab Data Review:  Recent Results (from the past 24 hour(s))   CBC without Differential    Collection Time: 06/22/18  5:30 PM   Result Value Ref Range    WBC 6.1 4.8 - 10.8 K/uL    RBC 2.82 (L) 4.20 - 5.40 M/uL    Hemoglobin 8.6 (L) 12.0 - 16.0 g/dL    Hematocrit 25.1 (L) 37.0 - 47.0 %    MCV 89.0 81.4 - 97.8 fL    MCH 30.5 27.0 - 33.0 pg    MCHC 34.3 33.6 - 35.0 g/dL    RDW 50.2 (H) 35.9 - 50.0 fL    Platelet Count 168 164 - 446 K/uL    MPV 10.2 9.0 - 12.9 fL   MAGNESIUM    Collection Time: 06/22/18  5:30 PM   Result Value Ref Range    Magnesium 2.2 1.5 - 2.5 mg/dL   PHOSPHORUS    Collection Time: 06/22/18  5:30 PM   Result Value Ref Range    Phosphorus 8.2 (H) 2.5 - 4.5 mg/dL   Basic Metabolic Panel (BMP)    Collection Time: 06/22/18  5:30 PM   Result Value Ref Range    Sodium 139 135 - 145 mmol/L    Potassium 5.5 3.6 - 5.5 mmol/L    Chloride 114 (H) 96 - 112 mmol/L    Co2 15 (L) 20 - 33 mmol/L    Glucose 126 (H) 65 - 99 mg/dL    Bun 69 (H) 8 - 22 mg/dL    Creatinine 3.76 (H) 0.50 - 1.40 mg/dL    Calcium 8.2 (L) 8.5 - 10.5 mg/dL    Anion Gap 10.0 0.0 - 11.9   ESTIMATED GFR    Collection Time: 06/22/18  5:30 PM   Result Value Ref Range    GFR If  17 (A) >60 mL/min/1.73 m 2    GFR If Non  14 (A) >60 mL/min/1.73 m 2   MAGNESIUM    Collection Time: 06/22/18 11:31 PM   Result Value Ref Range    Magnesium 2.1 1.5 - 2.5 mg/dL   PHOSPHORUS    Collection Time: 06/22/18 11:31 PM   Result Value Ref Range    Phosphorus 8.4 (H) 2.5 - 4.5 mg/dL   CBC  without Differential    Collection Time: 06/23/18  5:14 AM   Result Value Ref Range    WBC 11.4 (H) 4.8 - 10.8 K/uL    RBC 2.71 (L) 4.20 - 5.40 M/uL    Hemoglobin 7.9 (L) 12.0 - 16.0 g/dL    Hematocrit 24.3 (L) 37.0 - 47.0 %    MCV 89.7 81.4 - 97.8 fL    MCH 29.2 27.0 - 33.0 pg    MCHC 32.5 (L) 33.6 - 35.0 g/dL    RDW 52.5 (H) 35.9 - 50.0 fL    Platelet Count 182 164 - 446 K/uL    MPV 10.5 9.0 - 12.9 fL   MAGNESIUM    Collection Time: 06/23/18  5:14 AM   Result Value Ref Range    Magnesium 2.1 1.5 - 2.5 mg/dL   PHOSPHORUS    Collection Time: 06/23/18  5:14 AM   Result Value Ref Range    Phosphorus 8.6 (H) 2.5 - 4.5 mg/dL   BASIC METABOLIC PANEL    Collection Time: 06/23/18  5:14 AM   Result Value Ref Range    Sodium 141 135 - 145 mmol/L    Potassium 5.0 3.6 - 5.5 mmol/L    Chloride 114 (H) 96 - 112 mmol/L    Co2 18 (L) 20 - 33 mmol/L    Glucose 108 (H) 65 - 99 mg/dL    Bun 68 (H) 8 - 22 mg/dL    Creatinine 3.44 (H) 0.50 - 1.40 mg/dL    Calcium 8.2 (L) 8.5 - 10.5 mg/dL    Anion Gap 9.0 0.0 - 11.9   IRON/TOTAL IRON BIND    Collection Time: 06/23/18  5:14 AM   Result Value Ref Range    Iron 77 40 - 170 ug/dL    Total Iron Binding 204 (L) 250 - 450 ug/dL    % Saturation 38 15 - 55 %   RETICULOCYTES COUNT    Collection Time: 06/23/18  5:14 AM   Result Value Ref Range    Reticulocyte Count 3.0 (H) 0.8 - 2.1 %    Retic, Absolute 0.08 (H) 0.04 - 0.06 M/uL    Imm. Reticulocyte Fraction 28.7 (H) 9.3 - 17.4 %    Retic Hgb Equivalent 32.9 29.0 - 35.0 pg/cell   ESTIMATED GFR    Collection Time: 06/23/18  5:14 AM   Result Value Ref Range    GFR If  19 (A) >60 mL/min/1.73 m 2    GFR If Non  16 (A) >60 mL/min/1.73 m 2   FERRITIN    Collection Time: 06/23/18  9:40 AM   Result Value Ref Range    Ferritin 339.1 (H) 10.0 - 291.0 ng/mL   VITAMIN B12    Collection Time: 06/23/18  9:40 AM   Result Value Ref Range    Vitamin B12 -True Cobalamin 173 (L) 211 - 911 pg/mL   FOLATE    Collection Time: 06/23/18   9:40 AM   Result Value Ref Range    Folate -Folic Acid 12.9 >4.0 ng/mL       Imaging/Procedures Review:    Independant Imaging Review: Completed  IR-EMBOLIZATION   Final Result      1.  Left renal angiography showing no evidence of active extravasation, pseudoaneurysm, AV fistula, or other evidence of vascular injury. No coil embolization was indicated.                  INTERPRETING LOCATION: [facility] [facility], [place], [address]      IR-CVC NON TUNNELED > AGE 4   Final Result      1. ULTRASOUND AND FLUOROSCOPIC GUIDED PLACEMENT OF A RIGHT INTERNAL JUGULAR 12 [place] TRIALYSIS TRIPLE LUMEN NON-TUNNELED HEMODIALYSIS CATHETER.      2. THE HEMODIALYSIS CATHETER MAY BE USED IMMEDIATELY AS CLINICALLY INDICATED. FLUSHES PER PROTOCOL.      US-RENAL   Final Result      Redemonstration of large hematoma anterior and lateral to the left kidney.      Moderate pelvic ascites. Trace perihepatic ascites.      CT-ABDOMEN-PELVIS W/O   Final Result         1. Large left perinephric space and left retroperitoneal hemorrhage and hematoma extending to the left lower quadrant and pelvis..      2. Large pericardial effusion. Small right pleural effusion.      3. Diffuse anasarca, abdominal and pelvic ascites.      CRITICAL RESULT READ BACK: Preliminary findings discussed with and critical read back performed by Dr. BIMAL SANTACRUZ in the Emergency Department via telephone on 6/21/2018 10:04 PM      ECHOCARDIOGRAM COMP W/O CONT    (Results Pending)            EKG:          Assessment:     1)AWA      -Multifactorial :          -2/2 to worsening SLE nephritis (recent biopsy confirmed)      -Contrast received during IR guided arteriogram further causing contrast induced nephropathy.        2)CKD stage III              -History of lupus induced nephritis with Stage III disease with Cr 2.08 on 05/22/2018.               - Proteinuria, rising anti-Ds DNA and low complements.               -Low Albumin 2.4  3)Hyperkalemia            - 2/2 to metabolic  acidosis in the setting of AWA on CKD further aggravated due to hematoma/hemolysis plus ACEI/ARBs(on Losartan and Candesartan).            - On Bicarb drip.   4) Acute on chronic Anemia in the setting of large retroperitoneal hematoma Iatrogenic after left sided renal biopsy.          -s/p 3 units of PRBCS          -s/p IR guided arteriogram requiring no embolization.            -Remains hemodynamically stable.           -Iron supplements.  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   8) Secondary hyperaparathyroidism           -Intact PTH 68 and Ca 8.2  9) Hyperphosphatemia           -Phosp 8.4 with low Ca in the setting of Vit D deficiency and CKD.  10) Acute on chronic Large Pericardial Effusion without hemodynamic compromise           - Echocardiogram pending.  11) Dyslipidemia          - 2/2 to nephrotic syndrome        Plan:  - DC IVF  - EPO  -DC Cellcept  -Rituximbab 375mg/meter squared  -DC ma  -advacne diet  -no indication forHD today            Quality Measures  Quality-Core Measures   Reviewed items::  Labs reviewed, Medications reviewed and Radiology images reviewed    PCP: @PCP

## 2018-06-24 LAB
25(OH)D3 SERPL-MCNC: 10 NG/ML (ref 30–100)
ANION GAP SERPL CALC-SCNC: 10 MMOL/L (ref 0–11.9)
BACTERIA UR CULT: NORMAL
BASOPHILS # BLD AUTO: 0.1 % (ref 0–1.8)
BASOPHILS # BLD: 0.01 K/UL (ref 0–0.12)
BUN SERPL-MCNC: 69 MG/DL (ref 8–22)
CALCIUM SERPL-MCNC: 8.1 MG/DL (ref 8.5–10.5)
CHLORIDE SERPL-SCNC: 109 MMOL/L (ref 96–112)
CO2 SERPL-SCNC: 20 MMOL/L (ref 20–33)
CREAT SERPL-MCNC: 3.87 MG/DL (ref 0.5–1.4)
EOSINOPHIL # BLD AUTO: 0 K/UL (ref 0–0.51)
EOSINOPHIL NFR BLD: 0 % (ref 0–6.9)
ERYTHROCYTE [DISTWIDTH] IN BLOOD BY AUTOMATED COUNT: 55.3 FL (ref 35.9–50)
GLUCOSE SERPL-MCNC: 115 MG/DL (ref 65–99)
HCT VFR BLD AUTO: 21 % (ref 37–47)
HGB BLD-MCNC: 7 G/DL (ref 12–16)
IMM GRANULOCYTES # BLD AUTO: 0.19 K/UL (ref 0–0.11)
IMM GRANULOCYTES NFR BLD AUTO: 1.8 % (ref 0–0.9)
LV EJECT FRACT  99904: 25
LV EJECT FRACT MOD 2C 99903: 21.67
LV EJECT FRACT MOD 4C 99902: 37.23
LV EJECT FRACT MOD BP 99901: 30.49
LYMPHOCYTES # BLD AUTO: 1.63 K/UL (ref 1–4.8)
LYMPHOCYTES NFR BLD: 15.1 % (ref 22–41)
MAGNESIUM SERPL-MCNC: 2.1 MG/DL (ref 1.5–2.5)
MCH RBC QN AUTO: 30.3 PG (ref 27–33)
MCHC RBC AUTO-ENTMCNC: 33.3 G/DL (ref 33.6–35)
MCV RBC AUTO: 90.9 FL (ref 81.4–97.8)
MONOCYTES # BLD AUTO: 0.75 K/UL (ref 0–0.85)
MONOCYTES NFR BLD AUTO: 7 % (ref 0–13.4)
NEUTROPHILS # BLD AUTO: 8.21 K/UL (ref 2–7.15)
NEUTROPHILS NFR BLD: 76 % (ref 44–72)
NRBC # BLD AUTO: 0 K/UL
NRBC BLD-RTO: 0 /100 WBC
PHOSPHATE SERPL-MCNC: 9.4 MG/DL (ref 2.5–4.5)
PLATELET # BLD AUTO: 170 K/UL (ref 164–446)
PMV BLD AUTO: 10.4 FL (ref 9–12.9)
POTASSIUM SERPL-SCNC: 5.1 MMOL/L (ref 3.6–5.5)
RBC # BLD AUTO: 2.31 M/UL (ref 4.2–5.4)
SIGNIFICANT IND 70042: NORMAL
SITE SITE: NORMAL
SODIUM SERPL-SCNC: 139 MMOL/L (ref 135–145)
SOURCE SOURCE: NORMAL
WBC # BLD AUTO: 10.8 K/UL (ref 4.8–10.8)

## 2018-06-24 PROCEDURE — 700105 HCHG RX REV CODE 258

## 2018-06-24 PROCEDURE — 700111 HCHG RX REV CODE 636 W/ 250 OVERRIDE (IP): Performed by: INTERNAL MEDICINE

## 2018-06-24 PROCEDURE — 80048 BASIC METABOLIC PNL TOTAL CA: CPT

## 2018-06-24 PROCEDURE — 700102 HCHG RX REV CODE 250 W/ 637 OVERRIDE(OP): Performed by: INTERNAL MEDICINE

## 2018-06-24 PROCEDURE — 770022 HCHG ROOM/CARE - ICU (200)

## 2018-06-24 PROCEDURE — 83735 ASSAY OF MAGNESIUM: CPT

## 2018-06-24 PROCEDURE — 700111 HCHG RX REV CODE 636 W/ 250 OVERRIDE (IP)

## 2018-06-24 PROCEDURE — 93306 TTE W/DOPPLER COMPLETE: CPT

## 2018-06-24 PROCEDURE — A9270 NON-COVERED ITEM OR SERVICE: HCPCS | Performed by: INTERNAL MEDICINE

## 2018-06-24 PROCEDURE — 700111 HCHG RX REV CODE 636 W/ 250 OVERRIDE (IP): Performed by: HOSPITALIST

## 2018-06-24 PROCEDURE — 700102 HCHG RX REV CODE 250 W/ 637 OVERRIDE(OP): Performed by: HOSPITALIST

## 2018-06-24 PROCEDURE — 700105 HCHG RX REV CODE 258: Performed by: INTERNAL MEDICINE

## 2018-06-24 PROCEDURE — A9270 NON-COVERED ITEM OR SERVICE: HCPCS | Performed by: HOSPITALIST

## 2018-06-24 PROCEDURE — 93306 TTE W/DOPPLER COMPLETE: CPT | Mod: 26 | Performed by: INTERNAL MEDICINE

## 2018-06-24 PROCEDURE — 84100 ASSAY OF PHOSPHORUS: CPT

## 2018-06-24 PROCEDURE — A9270 NON-COVERED ITEM OR SERVICE: HCPCS | Performed by: STUDENT IN AN ORGANIZED HEALTH CARE EDUCATION/TRAINING PROGRAM

## 2018-06-24 PROCEDURE — 85025 COMPLETE CBC W/AUTO DIFF WBC: CPT

## 2018-06-24 PROCEDURE — 700102 HCHG RX REV CODE 250 W/ 637 OVERRIDE(OP): Performed by: STUDENT IN AN ORGANIZED HEALTH CARE EDUCATION/TRAINING PROGRAM

## 2018-06-24 PROCEDURE — 99233 SBSQ HOSP IP/OBS HIGH 50: CPT | Performed by: INTERNAL MEDICINE

## 2018-06-24 PROCEDURE — 700101 HCHG RX REV CODE 250: Performed by: INTERNAL MEDICINE

## 2018-06-24 RX ORDER — CARVEDILOL 25 MG/1
25 TABLET ORAL 2 TIMES DAILY WITH MEALS
Status: DISCONTINUED | OUTPATIENT
Start: 2018-06-24 | End: 2018-06-27

## 2018-06-24 RX ORDER — ISOSORBIDE MONONITRATE 30 MG/1
60 TABLET, EXTENDED RELEASE ORAL
Status: DISCONTINUED | OUTPATIENT
Start: 2018-06-24 | End: 2018-06-29 | Stop reason: HOSPADM

## 2018-06-24 RX ORDER — HYDRALAZINE HYDROCHLORIDE 10 MG/1
10 TABLET, FILM COATED ORAL EVERY 8 HOURS
Status: DISCONTINUED | OUTPATIENT
Start: 2018-06-24 | End: 2018-06-27

## 2018-06-24 RX ORDER — CHOLECALCIFEROL (VITAMIN D3) 125 MCG
1000 CAPSULE ORAL DAILY
Status: DISCONTINUED | OUTPATIENT
Start: 2018-06-24 | End: 2018-06-29 | Stop reason: HOSPADM

## 2018-06-24 RX ADMIN — ACETAMINOPHEN 650 MG: 325 TABLET, FILM COATED ORAL at 08:11

## 2018-06-24 RX ADMIN — SEVELAMER CARBONATE 800 MG: 800 TABLET, FILM COATED ORAL at 13:57

## 2018-06-24 RX ADMIN — LABETALOL HYDROCHLORIDE 20 MG: 5 INJECTION, SOLUTION INTRAVENOUS at 18:38

## 2018-06-24 RX ADMIN — SEVELAMER CARBONATE 800 MG: 800 TABLET, FILM COATED ORAL at 08:23

## 2018-06-24 RX ADMIN — CARVEDILOL 25 MG: 25 TABLET, FILM COATED ORAL at 20:18

## 2018-06-24 RX ADMIN — HYDROXYCHLOROQUINE SULFATE 200 MG: 200 TABLET, FILM COATED ORAL at 08:23

## 2018-06-24 RX ADMIN — DIPHENHYDRAMINE HYDROCHLORIDE 25 MG: 50 INJECTION, SOLUTION INTRAMUSCULAR; INTRAVENOUS at 08:13

## 2018-06-24 RX ADMIN — LABETALOL HYDROCHLORIDE 20 MG: 5 INJECTION, SOLUTION INTRAVENOUS at 14:06

## 2018-06-24 RX ADMIN — HYDROCODONE BITARTRATE AND ACETAMINOPHEN 1 TABLET: 5; 325 TABLET ORAL at 19:15

## 2018-06-24 RX ADMIN — LABETALOL HCL 200 MG: 200 TABLET, FILM COATED ORAL at 08:23

## 2018-06-24 RX ADMIN — HYDROXYCHLOROQUINE SULFATE 200 MG: 200 TABLET, FILM COATED ORAL at 20:18

## 2018-06-24 RX ADMIN — SODIUM CHLORIDE 700 MG: 9 INJECTION, SOLUTION INTRAVENOUS at 09:06

## 2018-06-24 RX ADMIN — CEFTRIAXONE 2 G: 2 INJECTION, POWDER, FOR SOLUTION INTRAMUSCULAR; INTRAVENOUS at 08:12

## 2018-06-24 RX ADMIN — SEVELAMER CARBONATE 800 MG: 800 TABLET, FILM COATED ORAL at 17:40

## 2018-06-24 RX ADMIN — HYDRALAZINE HYDROCHLORIDE 20 MG: 20 INJECTION INTRAMUSCULAR; INTRAVENOUS at 22:02

## 2018-06-24 RX ADMIN — PREDNISONE 60 MG: 20 TABLET ORAL at 08:23

## 2018-06-24 RX ADMIN — FAMOTIDINE 20 MG: 10 INJECTION INTRAVENOUS at 08:11

## 2018-06-24 RX ADMIN — Medication 1000 MCG: at 08:23

## 2018-06-24 RX ADMIN — ISOSORBIDE MONONITRATE 60 MG: 30 TABLET ORAL at 20:18

## 2018-06-24 RX ADMIN — HYDRALAZINE HYDROCHLORIDE 20 MG: 20 INJECTION INTRAMUSCULAR; INTRAVENOUS at 16:53

## 2018-06-24 RX ADMIN — HYDRALAZINE HYDROCHLORIDE 10 MG: 10 TABLET, FILM COATED ORAL at 21:10

## 2018-06-24 ASSESSMENT — ENCOUNTER SYMPTOMS
MYALGIAS: 1
WEAKNESS: 1
FOCAL WEAKNESS: 0
NAUSEA: 1
SPUTUM PRODUCTION: 0
FLANK PAIN: 1
BLOOD IN STOOL: 0
CARDIOVASCULAR NEGATIVE: 1
PALPITATIONS: 0
EYES NEGATIVE: 1
NAUSEA: 0
ABDOMINAL PAIN: 1
BACK PAIN: 1
FEVER: 0
CHILLS: 0
HEADACHES: 1
RESPIRATORY NEGATIVE: 1
BLURRED VISION: 0
DEPRESSION: 0
SHORTNESS OF BREATH: 0
PSYCHIATRIC NEGATIVE: 1
VOMITING: 0
DIZZINESS: 0
DOUBLE VISION: 0
BRUISES/BLEEDS EASILY: 1
WEAKNESS: 0

## 2018-06-24 ASSESSMENT — PAIN SCALES - GENERAL
PAINLEVEL_OUTOF10: 0
PAINLEVEL_OUTOF10: 2
PAINLEVEL_OUTOF10: 0
PAINLEVEL_OUTOF10: 7
PAINLEVEL_OUTOF10: 3
PAINLEVEL_OUTOF10: 0
PAINLEVEL_OUTOF10: 3
PAINLEVEL_OUTOF10: 0
PAINLEVEL_OUTOF10: 0
PAINLEVEL_OUTOF10: 3
PAINLEVEL_OUTOF10: 0
PAINLEVEL_OUTOF10: 2
PAINLEVEL_OUTOF10: 0

## 2018-06-24 NOTE — CONSULTS
Note Author: Nishant Roman M.D.       Name Mendy Solano     1990   Age/Sex 28 y.o. female   MRN [id number]   Code Status Full Code       Chief Complaint:  AWA on CKD complicated with left perinephric hemorrhage after renal biopsy, acute blood loss requiring transfusion.     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 In the ER she underwent CT scan that showed large perinephric hematoma with a hemoglobin of 6.8 and  large pericardial effusion with generalized/diffuse anasarca, abdominal and pelvic ascites. She remained hemodynamically stable and received total of 3 units of PRBCs since admission and underwent TEG study that showed hypercoagulable pattern with adequate parameters. U/S of the renal showed large hematoma anterior and lateral to the left kidney with moderate pelvic ascites and trace perihepatic ascites. She was also found to have acute kidney injury superimposed on chronic kidney injury complicated with hyperkalemia.    Given her extensive large left perinephric hemorrhage status post biopsy she underwent a renal angiogram of the left kidney that showed no evidence of active bleeding, pseudoaneurysm, AV fistula or other evidence of vascular injury requiring no coil embolization. She did receive contrast during the study.    She was diagnosed with lupus in 2018 and underwent biopsy on 2018 that showed mixed diffuse proliferative membranous lupus nephritis. On 2018 she was noted to have acute worsening of her creatinine and proteinuria and complement levels were low with  "anti-double-stranded DNA levels being high and was started on prednisone 60 mg and CellCept dose was increased and recommended to start mycophenolate mofetil. Given her worsening kidney function she was recommended to get a renal biopsy despite the treatment.    6/23/18 Pt has ongoing abdominal pain, but no hematuria, TEMP HD catheter placed yesterday  6/24/18 EPO received yesterday, ongoing abdominla pain        Review of Systems   Constitutional: Positive for malaise/fatigue.   HENT: Negative.    Eyes: Negative.    Respiratory: Negative.    Cardiovascular: Negative.    Gastrointestinal: Positive for abdominal pain.   Genitourinary:        Desai   Musculoskeletal: Positive for myalgias.   Skin: Negative.    Neurological: Positive for weakness.   Endo/Heme/Allergies: Bruises/bleeds easily.   Psychiatric/Behavioral: Negative.              Past Medical History:   Past Medical History:   Diagnosis Date   • Anemia    • Hypertension    • IUD (intrauterine device) in place 06/20/2018   • Lupus    • Renal disorder 06/20/2018    \"Stage 3 kidney disease\"   • Ruptured ectopic pregnancy        Past Surgical History:  Past Surgical History:   Procedure Laterality Date   • PELVISCOPY  8/30/08    Performed by JEREMIAH MENJIVAR at [facility]   • LYSIS ADHESIONS GYN  8/30/08    Performed by JEREMIAH MENJIVAR at [facility]       Current Outpatient Medications:  Home Medications     Reviewed by Valeria Collins (Pharmacy Tech) on 06/22/18 at 0930  Med List Status: Complete   Medication Last Dose Status   candesartan (ATACAND) 4 MG Tab  Active   ferrous sulfate 325 (65 Fe) MG tablet  Active   hydrocodone-acetaminophen (NORCO) 5-325 MG Tab per tablet 6/21/2018 Active   hydroxychloroquine (PLAQUENIL) 200 MG Tab 6/20/2018 Active   losartan (COZAAR) 25 MG Tab  Active   mycophenolate sodium (MYFORTIC) 360 MG Tablet Delayed Response tablet week Active   predniSONE (DELTASONE) 20 MG Tab 6/20/2018 Active " "  vitamin D, Ergocalciferol, (DRISDOL) 35958 units Cap capsule  Active                Medication Allergy/Sensitivities:  Allergies   Allergen Reactions   • Nkda [No Known Drug Allergy]          Family History:  Family History   Problem Relation Age of Onset   • Arthritis Mother        Social History:  Social History     Social History   • Marital status: Single     Spouse name: N/A   • Number of children: N/A   • Years of education: N/A     Occupational History   • Not on file.     Social History Main Topics   • Smoking status: Never Smoker   • Smokeless tobacco: Never Used   • Alcohol use No   • Drug use: No   • Sexual activity: Not on file     Other Topics Concern   • Not on file     Social History Narrative   • No narrative on file     Living situation: At home  PCP : Pcp Pt States None        Physical Exam     Vitals:    06/24/18 0300 06/24/18 0400 06/24/18 0500 06/24/18 0600   BP:       Pulse: 98 100 100 (!) 101   Resp: 16 19 17 18   Temp:       TempSrc:       SpO2: 98% 98% 96% 93%   Weight:    77.8 kg (171 lb 8.3 oz)   Height:         Body mass index is 28.54 kg/m².  /96   Pulse (!) 101   Temp 37.9 °C (100.3 °F)   Resp 18   Ht 1.651 m (5' 5\")   Wt 77.8 kg (171 lb 8.3 oz)   LMP 05/28/2018 Comment: \"IUD in place\"  SpO2 93%   Breastfeeding? No   BMI 28.54 kg/m²   O2 therapy: Pulse Oximetry: 93 %, O2 (LPM): 0.5, O2 Delivery: None (Room Air)    Physical Exam   Constitutional: She is oriented to person, place, and time. No distress.   HENT:   Head: Normocephalic and atraumatic.   Hemodialysis catheter in place.    Eyes: No scleral icterus.   Neck:   RIght IJ temp cath   Cardiovascular: Normal rate, regular rhythm, normal heart sounds and intact distal pulses.  Exam reveals no gallop and no friction rub.    No murmur heard.  Tachycardia.    Pulmonary/Chest: Effort normal. No respiratory distress. She has no wheezes.   Decreased breath sounds on the bases.    Abdominal: Soft. She exhibits distension. " There is tenderness. There is guarding. There is no rebound.   Hypoactive bowel sounds, distended.  No flank bruising.        Musculoskeletal: Normal range of motion. She exhibits edema. She exhibits no tenderness or deformity.   1+ pitting edema.    Lymphadenopathy:     She has no cervical adenopathy.   Neurological: She is alert and oriented to person, place, and time.   Skin: She is not diaphoretic.   Nursing note and vitals reviewed.            Data Review       Old Records Request:   Completed  Current Records review/summary: Completed    Lab Data Review:  Recent Results (from the past 24 hour(s))   CBC WITH DIFFERENTIAL    Collection Time: 06/24/18  4:53 AM   Result Value Ref Range    WBC 10.8 4.8 - 10.8 K/uL    RBC 2.31 (L) 4.20 - 5.40 M/uL    Hemoglobin 7.0 (L) 12.0 - 16.0 g/dL    Hematocrit 21.0 (L) 37.0 - 47.0 %    MCV 90.9 81.4 - 97.8 fL    MCH 30.3 27.0 - 33.0 pg    MCHC 33.3 (L) 33.6 - 35.0 g/dL    RDW 55.3 (H) 35.9 - 50.0 fL    Platelet Count 170 164 - 446 K/uL    MPV 10.4 9.0 - 12.9 fL    Neutrophils-Polys 76.00 (H) 44.00 - 72.00 %    Lymphocytes 15.10 (L) 22.00 - 41.00 %    Monocytes 7.00 0.00 - 13.40 %    Eosinophils 0.00 0.00 - 6.90 %    Basophils 0.10 0.00 - 1.80 %    Immature Granulocytes 1.80 (H) 0.00 - 0.90 %    Nucleated RBC 0.00 /100 WBC    Neutrophils (Absolute) 8.21 (H) 2.00 - 7.15 K/uL    Lymphs (Absolute) 1.63 1.00 - 4.80 K/uL    Monos (Absolute) 0.75 0.00 - 0.85 K/uL    Eos (Absolute) 0.00 0.00 - 0.51 K/uL    Baso (Absolute) 0.01 0.00 - 0.12 K/uL    Immature Granulocytes (abs) 0.19 (H) 0.00 - 0.11 K/uL    NRBC (Absolute) 0.00 K/uL   BASIC METABOLIC PANEL    Collection Time: 06/24/18  4:53 AM   Result Value Ref Range    Sodium 139 135 - 145 mmol/L    Potassium 5.1 3.6 - 5.5 mmol/L    Chloride 109 96 - 112 mmol/L    Co2 20 20 - 33 mmol/L    Glucose 115 (H) 65 - 99 mg/dL    Bun 69 (H) 8 - 22 mg/dL    Creatinine 3.87 (H) 0.50 - 1.40 mg/dL    Calcium 8.1 (L) 8.5 - 10.5 mg/dL    Anion Gap 10.0  0.0 - 11.9   MAGNESIUM    Collection Time: 06/24/18  4:53 AM   Result Value Ref Range    Magnesium 2.1 1.5 - 2.5 mg/dL   ESTIMATED GFR    Collection Time: 06/24/18  4:53 AM   Result Value Ref Range    GFR If  17 (A) >60 mL/min/1.73 m 2    GFR If Non  14 (A) >60 mL/min/1.73 m 2   PHOSPHORUS    Collection Time: 06/24/18  4:53 AM   Result Value Ref Range    Phosphorus 9.4 (H) 2.5 - 4.5 mg/dL       Imaging/Procedures Review:    Independant Imaging Review: Completed  IR-EMBOLIZATION   Final Result      1.  Left renal angiography showing no evidence of active extravasation, pseudoaneurysm, AV fistula, or other evidence of vascular injury. No coil embolization was indicated.                  INTERPRETING LOCATION: [address] [place] [address], [facility], [facility]      IR-CVC NON TUNNELED > AGE 4   Final Result      1. ULTRASOUND AND FLUOROSCOPIC GUIDED PLACEMENT OF A RIGHT INTERNAL JUGULAR 12 [place] TRIALYSIS TRIPLE LUMEN NON-TUNNELED HEMODIALYSIS CATHETER.      2. THE HEMODIALYSIS CATHETER MAY BE USED IMMEDIATELY AS CLINICALLY INDICATED. FLUSHES PER PROTOCOL.      US-RENAL   Final Result      Redemonstration of large hematoma anterior and lateral to the left kidney.      Moderate pelvic ascites. Trace perihepatic ascites.      CT-ABDOMEN-PELVIS W/O   Final Result         1. Large left perinephric space and left retroperitoneal hemorrhage and hematoma extending to the left lower quadrant and pelvis..      2. Large pericardial effusion. Small right pleural effusion.      3. Diffuse anasarca, abdominal and pelvic ascites.      CRITICAL RESULT READ BACK: Preliminary findings discussed with and critical read back performed by Dr. BIMAL SANTACRUZ in the Emergency Department via telephone on 6/21/2018 10:04 PM      ECHOCARDIOGRAM COMP W/O CONT    (Results Pending)            EKG:          Assessment:     1)AWA      -Multifactorial :          -2/2 to worsening SLE nephritis (recent biopsy confirmed)      -Contrast  received during IR guided arteriogram further causing contrast induced nephropathy.        2)CKD stage III              -History of lupus induced nephritis with Stage III disease with Cr 2.08 on 05/22/2018.               - Proteinuria, rising anti-Ds DNA and low complements.               -Low Albumin 2.4  3)Hyperkalemia            - resolved  4) Acute on chronic Anemia in the setting of large retroperitoneal hematoma Iatrogenic after left sided renal biopsy.          -s/p 3 units of PRBCS          -s/p IR guided arteriogram requiring no embolization.            -Remains hemodynamically stable.           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   8) Secondary hyperaparathyroidism           -Intact PTH 68 and Ca 8.2  9) Hyperphosphatemia           -Phosp 8.4 with low Ca in the setting of Vit D deficiency and CKD.  10) Acute on chronic Large Pericardial Effusion without hemodynamic compromise           - Echocardiogram pending.  11) Dyslipidemia          - 2/2 to nephrotic syndrome        Plan:  -Rituximbab 375mg/meter squared today  -DC ma  -advacne diet  -no indication for HD today            Quality Measures  Quality-Core Measures   Reviewed items::  Labs reviewed, Medications reviewed and Radiology images reviewed    PCP: @PCP

## 2018-06-24 NOTE — PROGRESS NOTES
Chemotherapy Verification - PRIMARY RN  Cycle # 1 Day # 1      Height = 165.1 cm  Weight = 77.8 kg  BSA = 1.89 m2       Medication: Rituximab  Dose: 375 mg/m2  Calculated Dose: 708.75 mg (htxrkzk=116 mg)                             (In mg/m2, AUC, mg/kg)     I confirm this process was performed independently with the BSA and all final chemotherapy dosing calculations congruent.  Any discrepancies of 5% or greater have been addressed with the chemotherapy pharmacist. The resolution of the discrepancy has been documented in the EPIC progress notes.

## 2018-06-24 NOTE — CARE PLAN
Problem: Infection  Goal: Will remain free from infection  Outcome: PROGRESSING AS EXPECTED  Temperatures taken via Desia catheter. Full bed and daily CHG bath completed. [facility] dialysis catheter dressing changed with sterile field. Hand  wipes provided. Standard precautions implemented. Hand hygiene performed before and after patient contact.    Problem: Mobility  Goal: Risk for activity intolerance will decrease  Outcome: PROGRESSING AS EXPECTED  Patient up to chair during shift. Patient is a x1 assist. Patient has generalized weakness. Patient encouraged to rest throughout the night.

## 2018-06-24 NOTE — PROGRESS NOTES
Chemotherapy Verification - SECONDARY RN   Cycle 1 day 1      Height = 165.1 cm  Weight = 77.8 kg  BSA = 1.89 m2       Medication: rituximab RITUXAN  Dose: 375 mg/m2  Calculated Dose: 708.8 mg (ordered dose= 700 mg, <5% difference)                             (In mg/m2, AUC, mg/kg)         I confirm that this process was performed independently.

## 2018-06-24 NOTE — CARE PLAN
Problem: Infection  Goal: Will remain free from infection  Pt temp, vitals, labs monitored. Hand hygiene performed before and after entering room and pt care. Family educated on foaming in and out of room.     Problem: Knowledge Deficit  Goal: Knowledge of disease process/condition, treatment plan, diagnostic tests, and medications will improve  Discussed plan of care with pt. Discussed rituximab medication with Pt and Chemo Rn.    Problem: Mobility  Goal: Risk for activity intolerance will decrease  Pt OOB as tolerated and up to chair.

## 2018-06-24 NOTE — PROGRESS NOTES
Pulmonary Critical Care Progress Note      Admit Date: 6/21/2018    Chief Complaint: perinephric hematoma    History of Present Illness: 28 yof with a PmHx of lupus, HTN and stage 3 CKD. Underwent a left renal biopsy today and since discharge has noted increasing left flank pain and abdominal pain. In the ER was found to have a large perinephric hematoma on CT with a Hg of 6.8 then 6.4. She received 2 units PRBCs in the ER and has remained hemodynamically stable. Her CT also shows a large pericardial effusion. She denies any dizziness, LOC, CP, palpitations, SOB, hematuria or melena/BRBPR. She is to be admitted to the ICU given her significant bleeding and risk for hemodynamic worsening given her effusion and size of hematoma.      Interval Events:  24 hour interval history reviewed   Tm 98.8  +1.1L over last 24hr  No cxr this am   Hb 7.9->7.0  Cr 3.76->3.44->3.87  C02 20    IR arteriography was neg for site of bleed 6/22  Renal Bx results show worsening disease  Starting Rituxan today  abdo pain improving but still resides    Rituxan  Prednisone 60  Plaquenil     Ceftriaxone    Addendum:  Echo returned with EF 25%, large pericardial effusion, mod MR, grade 2 diastolic dysfunction. Case discussed with Dr Vale of cardiology who will consult on patient as well.       Review of Systems   Constitutional: Negative for chills and fever.   HENT: Negative for congestion.    Eyes: Negative for blurred vision and double vision.   Respiratory: Negative for sputum production and shortness of breath.    Cardiovascular: Negative for chest pain and palpitations.   Gastrointestinal: Positive for abdominal pain. Negative for blood in stool, melena, nausea and vomiting.   Genitourinary: Negative for dysuria.   Neurological: Negative for focal weakness.   Psychiatric/Behavioral: Negative for depression.   All other systems reviewed and are negative.  unchanged      Physical Exam   Constitutional: She appears well-developed and  well-nourished.   HENT:   Head: Normocephalic and atraumatic.   Eyes: Conjunctivae are normal. Pupils are equal, round, and reactive to light.   Neck: No tracheal deviation present.   Cardiovascular: Normal rate and regular rhythm.    Pulmonary/Chest: She has no wheezes.   Diminished throughout   Abdominal: Soft. She exhibits no distension and no mass. There is tenderness. There is no rebound and no guarding.   Musculoskeletal: She exhibits edema.   Neurological: No cranial nerve deficit.   Skin: Skin is warm and dry.   Psychiatric: She has a normal mood and affect.   Nursing note and vitals reviewed.  unchanged    PFSH:  No change.    Respiratory:     Pulse Oximetry: 96 %  Chest Tube Drains:                  Invalid input(s): [id number]    HemoDynamics:  Pulse: 100, Heart Rate (Monitored): 99  NIBP: 146/90         Neuro:  GCS             Fluids:  Intake/Output       06/22/18 0700 - 06/23/18 0659 06/23/18 0700 - 06/24/18 0659 06/24/18 0700 - 06/25/18 0659      [id number] 7219-8433 Total 1931-6608 3843-9108 Total 6123-2965 7367-8671 Total       Intake    P.O.  270  120 390  780  360 1140  --  -- --    P.O. 270 120 390  -- -- --    I.V.  --  1800 1800  900  -- 900  --  -- --    IV Volume (0.45% NS w/ Bicarb) -- 1800 1800 900 -- 900 -- -- --    Blood  350  -- 350  --  -- --  --  -- --    Volume (RELEASE RED BLOOD CELLS-ACTIVE BLEEDING) 350 -- 350 -- -- -- -- -- --    Total Intake 620 1920 2540 8964 491 1151 -- -- --       Output    Urine  780  542 1322  550  320 870  --  -- --    Number of Times Voided 3 x -- 3 x -- -- -- -- -- --    Urine Void (mL) (non-catheter) 780 -- 780 -- -- -- -- -- --    Output (mL) (Urinary Catheter Indwelling Catheter) -- 542 542 550 320 870 -- -- --    Emesis  --  -- --  --  -- --  --  -- --    Emesis - Number of Times -- 2 x 2 x -- 1 x 1 x -- -- --    Stool  --  -- --  --  -- --  --  -- --    Number of Times Stooled -- 1 x 1 x -- 0 x 0 x -- -- --    Total Output   550 320 870 -- -- --       Net I/O     -160 1378 1218 1130 40 1170 -- -- --           Recent Labs      18   SODIUM  139   --   141  139   POTASSIUM  5.5   --   5.0  5.1   CHLORIDE  114*   --   114*  109   CO2  15*   --   18*  20   BUN  69*   --   68*  69*   CREATININE  3.76*   --   3.44*  3.87*   MAGNESIUM  2.2  2.1  2.1  2.1   PHOSPHORUS  8.2*  8.4*  8.6*   --    CALCIUM  8.2*   --   8.2*  8.1*       GI/Nutrition:    Liver Function  Recent Labs      18   ALTSGPT  5   --    --    --    --    ASTSGOT  8*   --    --    --    --    ALKPHOSPHAT  30   --    --    --    --    TBILIRUBIN  0.3   --    --    --    --    GLUCOSE  117*   < >  126*  108*  115*    < > = values in this interval not displayed.       Heme:  Recent Labs      18   0940  18   RBC  2.34*   < >  2.82*  2.71*   --   2.31*   HEMOGLOBIN  6.8*   < >  8.6*  7.9*   --   7.0*   HEMATOCRIT  22.3*   < >  25.1*  24.3*   --   21.0*   PLATELETCT  263   < >  168  182   --   170   PROTHROMBTM  14.1   --    --    --    --    --    INR  1.12   --    --    --    --    --    IRON   --    --    --   77   --    --    FERRITIN   --    --    --    --   339.1*   --    TOTIRONBC   --    --    --   204*   --    --     < > = values in this interval not displayed.       Infectious Disease:  Monitored Temp 2  Av.9 °C (98.5 °F)  Min: 36.6 °C (97.9 °F)  Max: 37.1 °C (98.8 °F)  Micro: cultures reviewed  Recent Labs      184   18   1730  18   0514  18   0453   WBC  12.1*  11.2*   < >  6.1  11.4*  10.8   NEUTSPOLYS  83.00*  74.60*   --    --    --   76.00*   LYMPHOCYTES  9.80*  14.60*   --    --    --   15.10*   MONOCYTES  5.40  7.50   --    --    --   7.00   EOSINOPHILS  0.00  0.10   --    --    --   0.00   BASOPHILS  0.00  0.40   --     --    --   0.10   ASTSGOT  8*   --    --    --    --    --    ALTSGPT  5   --    --    --    --    --    ALKPHOSPHAT  30   --    --    --    --    --    TBILIRUBIN  0.3   --    --    --    --    --     < > = values in this interval not displayed.     Current Facility-Administered Medications   Medication Dose Frequency Provider Last Rate Last Dose   • labetalol (NORMODYNE) tablet 200 mg  200 mg TWICE DAILY Andrews Barcenas M.D.   200 mg at 06/23/18 2027   • sevelamer carbonate (RENVELA) tablet 800 mg  800 mg TID WITH MEALS Sheridan Vasquez M.D.   800 mg at 06/23/18 1804   • acetaminophen (TYLENOL) tablet 650 mg  650 mg Once Nishant Roman M.D.       • diphenhydrAMINE (BENADRYL) injection 25 mg  25 mg Once Nishant Roman M.D.       • acetaminophen (TYLENOL) tablet 650 mg  650 mg Once PRN Nishant Roman M.D.       • diphenhydrAMINE (BENADRYL) injection 25 mg  25 mg Once PRN Nishant Roman M.D.       • meperidine (DEMEROL) injection 25 mg  25 mg Once PRN Nishant Romna M.D.       • hydrocortisone sodium succinate PF (SOLU-CORTEF) 100 MG injection 100 mg  100 mg Once PRN Nishant Roman M.D.       • famotidine (PEPCID) injection 20 mg  20 mg Once PRN Nishant Roman M.D.       • riTUXimab (RITUXAN) 700 mg in  mL Chemo Infusion  700 mg Once Nishant Roman M.D.       • HYDROcodone-acetaminophen (NORCO) 5-325 MG per tablet 1 Tab  1 Tab Q6HRS PRN Myha Frias M.D.   1 Tab at 06/23/18 1946   • hydroxychloroquine (PLAQUENIL) tablet 200 mg  200 mg BID Myah Frias M.D.   200 mg at 06/23/18 2026   • predniSONE (DELTASONE) tablet 60 mg  60 mg DAILY Myah Frias M.D.   60 mg at 06/23/18 0915   • senna-docusate (PERICOLACE or SENOKOT S) 8.6-50 MG per tablet 2 Tab  2 Tab BID Myah Frias M.D.   Stopped at 06/22/18 2100    And   • polyethylene glycol/lytes (MIRALAX) PACKET 1 Packet  1 Packet QDAY PRN Myah Frias M.D.        And   • magnesium hydroxide (MILK OF MAGNESIA) suspension 30 mL  30 mL QDAY PRN  Myah Frias M.D.        And   • bisacodyl (DULCOLAX) suppository 10 mg  10 mg QDAY PRN Myah Frias M.D.       • ondansetron (ZOFRAN) syringe/vial injection 4 mg  4 mg Q4HRS PRN Myah Frias M.D.   4 mg at 06/23/18 0230   • ondansetron (ZOFRAN ODT) dispertab 4 mg  4 mg Q4HRS PRN Myah Frias M.D.   Stopped at 06/22/18 0400   • promethazine (PHENERGAN) tablet 12.5-25 mg  12.5-25 mg Q4HRS PRN Myah Frias M.D.       • promethazine (PHENERGAN) suppository 12.5-25 mg  12.5-25 mg Q4HRS PRN Myah Frias M.D.   12.5 mg at 06/23/18 0332   • prochlorperazine (COMPAZINE) injection 5-10 mg  5-10 mg Q4HRS PRN Myah Frias M.D.       • acetaminophen (TYLENOL) tablet 650 mg  650 mg Q6HRS PRN Myah Frias M.D.   650 mg at 06/23/18 1204   • glucose 4 g chewable tablet 16 g  16 g Q15 MIN PRN Sheridan Vasquez M.D.        And   • dextrose 50% (D50W) injection 25 mL  25 mL Q15 MIN PRN Sheridan Vasquez M.D.       • cefTRIAXone (ROCEPHIN) 2 g in  mL IVPB  2 g Q24HRS James Wu Jr., D.O.   Stopped at 06/23/18 0946   • labetalol (NORMODYNE,TRANDATE) injection 10-20 mg  10-20 mg Q4HRS PRN Andrews Barcenas M.D.   20 mg at 06/23/18 1947   • hydrALAZINE (APRESOLINE) injection 10-20 mg  10-20 mg Q4HRS PRN Andrews Barcenas M.D.   20 mg at 06/23/18 0546     Last reviewed on 6/22/2018  9:30 AM by Valeria Collins    Quality  Measures:  Radiology images reviewed, Labs reviewed and Medications reviewed                      Assessment/Plan:  Post-procedural perinephric hematoma   - S/P IR Arteriography 6/22 - no ongoing bleed noted   - continue to follow and transfuse for Hb <7   - Teg 6/22 reviewed   - Hb slowly drifting - possible dilutional aspect as well     Blood loss anemia   - transfuse for Hb < 7     Large pericardial effusion              - per patient she has a hx of effusion, unknown hx of size              - F/u Echocardiogram - still pending, no hemodynamic compromise              - consider cardiology consult or  surgical consult if unstable     Acute on CKD 2/2 Lupus Nephritis   - appears she had Cr of 2.08 on 5/22/2018 in scanned documents              - renal dose meds, avoid nephrotoxins   - temp hd cath placed 6/22   - renal Bx with worsening dz   - rituxan started 6/24    Hyperkalemia   - resolved     Anasarca and ascites              - likely related to her CKD   - will need volume removal eventually     HTN   - labetalol     Lupus Nephritis    - plaquenil, prednisone   - mycophenolate stopped 6/23   - rituxan started 6/24    Discussed patient condition and risk of morbidity and/or mortality with Family, RN, RT, Therapies and nephrology.

## 2018-06-24 NOTE — PROGRESS NOTES
Dr. Chowdhury upated on patient's UOP of 85 over the last four hours. Will continue with current plan of care.

## 2018-06-24 NOTE — PROGRESS NOTES
Rituxan infusion initiated by two chemo certified RNs through R IJ temp dialysis cath. + blood return. Rate increased x 3 and pt tolerating well. VSS. Bedside RN notified and chemo education provided to pt and RN. Pt resting comfortably. Call light within reach.

## 2018-06-25 LAB
ANION GAP SERPL CALC-SCNC: 9 MMOL/L (ref 0–11.9)
BASOPHILS # BLD AUTO: 0.3 % (ref 0–1.8)
BASOPHILS # BLD: 0.04 K/UL (ref 0–0.12)
BUN SERPL-MCNC: 70 MG/DL (ref 8–22)
CALCIUM SERPL-MCNC: 7.9 MG/DL (ref 8.5–10.5)
CHLORIDE SERPL-SCNC: 110 MMOL/L (ref 96–112)
CO2 SERPL-SCNC: 20 MMOL/L (ref 20–33)
CREAT SERPL-MCNC: 3.78 MG/DL (ref 0.5–1.4)
CRP SERPL HS-MCNC: 1 MG/DL (ref 0–0.75)
CRP SERPL HS-MCNC: 9.5 MG/L (ref 0–7.5)
EKG IMPRESSION: NORMAL
EOSINOPHIL # BLD AUTO: 0.02 K/UL (ref 0–0.51)
EOSINOPHIL NFR BLD: 0.2 % (ref 0–6.9)
ERYTHROCYTE [DISTWIDTH] IN BLOOD BY AUTOMATED COUNT: 54 FL (ref 35.9–50)
ERYTHROCYTE [SEDIMENTATION RATE] IN BLOOD BY WESTERGREN METHOD: 41 MM/HOUR (ref 0–20)
GLUCOSE SERPL-MCNC: 106 MG/DL (ref 65–99)
HCT VFR BLD AUTO: 22.6 % (ref 37–47)
HGB BLD-MCNC: 7.1 G/DL (ref 12–16)
IMM GRANULOCYTES # BLD AUTO: 0.29 K/UL (ref 0–0.11)
IMM GRANULOCYTES NFR BLD AUTO: 2.4 % (ref 0–0.9)
LYMPHOCYTES # BLD AUTO: 1.7 K/UL (ref 1–4.8)
LYMPHOCYTES NFR BLD: 14.3 % (ref 22–41)
MAGNESIUM SERPL-MCNC: 2.3 MG/DL (ref 1.5–2.5)
MCH RBC QN AUTO: 29.3 PG (ref 27–33)
MCHC RBC AUTO-ENTMCNC: 31.4 G/DL (ref 33.6–35)
MCV RBC AUTO: 93.4 FL (ref 81.4–97.8)
MONOCYTES # BLD AUTO: 0.84 K/UL (ref 0–0.85)
MONOCYTES NFR BLD AUTO: 7 % (ref 0–13.4)
NEUTROPHILS # BLD AUTO: 9.03 K/UL (ref 2–7.15)
NEUTROPHILS NFR BLD: 75.8 % (ref 44–72)
NRBC # BLD AUTO: 0 K/UL
NRBC BLD-RTO: 0 /100 WBC
PLATELET # BLD AUTO: 207 K/UL (ref 164–446)
PMV BLD AUTO: 10.2 FL (ref 9–12.9)
POTASSIUM SERPL-SCNC: 4.6 MMOL/L (ref 3.6–5.5)
RBC # BLD AUTO: 2.42 M/UL (ref 4.2–5.4)
SODIUM SERPL-SCNC: 139 MMOL/L (ref 135–145)
TROPONIN I SERPL-MCNC: 0.02 NG/ML (ref 0–0.04)
TSH SERPL DL<=0.005 MIU/L-ACNC: 4.08 UIU/ML (ref 0.38–5.33)
WBC # BLD AUTO: 11.9 K/UL (ref 4.8–10.8)

## 2018-06-25 PROCEDURE — 80048 BASIC METABOLIC PNL TOTAL CA: CPT

## 2018-06-25 PROCEDURE — 85652 RBC SED RATE AUTOMATED: CPT

## 2018-06-25 PROCEDURE — 84443 ASSAY THYROID STIM HORMONE: CPT

## 2018-06-25 PROCEDURE — A9270 NON-COVERED ITEM OR SERVICE: HCPCS | Performed by: INTERNAL MEDICINE

## 2018-06-25 PROCEDURE — 770020 HCHG ROOM/CARE - TELE (206)

## 2018-06-25 PROCEDURE — 700102 HCHG RX REV CODE 250 W/ 637 OVERRIDE(OP): Performed by: STUDENT IN AN ORGANIZED HEALTH CARE EDUCATION/TRAINING PROGRAM

## 2018-06-25 PROCEDURE — 700102 HCHG RX REV CODE 250 W/ 637 OVERRIDE(OP): Performed by: INTERNAL MEDICINE

## 2018-06-25 PROCEDURE — 93010 ELECTROCARDIOGRAM REPORT: CPT | Performed by: INTERNAL MEDICINE

## 2018-06-25 PROCEDURE — 86141 C-REACTIVE PROTEIN HS: CPT

## 2018-06-25 PROCEDURE — 86140 C-REACTIVE PROTEIN: CPT

## 2018-06-25 PROCEDURE — 700102 HCHG RX REV CODE 250 W/ 637 OVERRIDE(OP): Performed by: HOSPITALIST

## 2018-06-25 PROCEDURE — 84484 ASSAY OF TROPONIN QUANT: CPT

## 2018-06-25 PROCEDURE — 700111 HCHG RX REV CODE 636 W/ 250 OVERRIDE (IP): Performed by: HOSPITALIST

## 2018-06-25 PROCEDURE — A9270 NON-COVERED ITEM OR SERVICE: HCPCS | Performed by: HOSPITALIST

## 2018-06-25 PROCEDURE — A9270 NON-COVERED ITEM OR SERVICE: HCPCS | Performed by: STUDENT IN AN ORGANIZED HEALTH CARE EDUCATION/TRAINING PROGRAM

## 2018-06-25 PROCEDURE — 700105 HCHG RX REV CODE 258: Performed by: INTERNAL MEDICINE

## 2018-06-25 PROCEDURE — 83735 ASSAY OF MAGNESIUM: CPT

## 2018-06-25 PROCEDURE — 700111 HCHG RX REV CODE 636 W/ 250 OVERRIDE (IP): Performed by: INTERNAL MEDICINE

## 2018-06-25 PROCEDURE — 85025 COMPLETE CBC W/AUTO DIFF WBC: CPT

## 2018-06-25 PROCEDURE — 93005 ELECTROCARDIOGRAM TRACING: CPT | Performed by: INTERNAL MEDICINE

## 2018-06-25 RX ORDER — AMLODIPINE BESYLATE 5 MG/1
5 TABLET ORAL
Status: DISCONTINUED | OUTPATIENT
Start: 2018-06-25 | End: 2018-06-26

## 2018-06-25 RX ORDER — AMLODIPINE BESYLATE 5 MG/1
2.5 TABLET ORAL
Status: DISCONTINUED | OUTPATIENT
Start: 2018-06-25 | End: 2018-06-25

## 2018-06-25 RX ADMIN — ACETAMINOPHEN 650 MG: 325 TABLET, FILM COATED ORAL at 15:32

## 2018-06-25 RX ADMIN — HYDROCODONE BITARTRATE AND ACETAMINOPHEN 1 TABLET: 5; 325 TABLET ORAL at 17:30

## 2018-06-25 RX ADMIN — AMLODIPINE BESYLATE 5 MG: 5 TABLET ORAL at 08:07

## 2018-06-25 RX ADMIN — Medication 1000 MCG: at 08:07

## 2018-06-25 RX ADMIN — HYDROCODONE BITARTRATE AND ACETAMINOPHEN 1 TABLET: 5; 325 TABLET ORAL at 03:36

## 2018-06-25 RX ADMIN — SEVELAMER CARBONATE 800 MG: 800 TABLET, FILM COATED ORAL at 08:07

## 2018-06-25 RX ADMIN — ACETAMINOPHEN 650 MG: 325 TABLET, FILM COATED ORAL at 08:07

## 2018-06-25 RX ADMIN — CARVEDILOL 25 MG: 25 TABLET, FILM COATED ORAL at 17:30

## 2018-06-25 RX ADMIN — DOCUSATE SODIUM AND SENNOSIDES 2 TABLET: 8.6; 5 TABLET, FILM COATED ORAL at 21:36

## 2018-06-25 RX ADMIN — HYDROXYCHLOROQUINE SULFATE 200 MG: 200 TABLET, FILM COATED ORAL at 21:37

## 2018-06-25 RX ADMIN — ACETAMINOPHEN 650 MG: 325 TABLET, FILM COATED ORAL at 01:44

## 2018-06-25 RX ADMIN — ONDANSETRON 4 MG: 2 INJECTION INTRAMUSCULAR; INTRAVENOUS at 13:56

## 2018-06-25 RX ADMIN — CEFTRIAXONE 2 G: 2 INJECTION, POWDER, FOR SOLUTION INTRAMUSCULAR; INTRAVENOUS at 08:05

## 2018-06-25 RX ADMIN — SEVELAMER CARBONATE 800 MG: 800 TABLET, FILM COATED ORAL at 10:40

## 2018-06-25 RX ADMIN — ISOSORBIDE MONONITRATE 60 MG: 30 TABLET ORAL at 10:40

## 2018-06-25 RX ADMIN — HYDROXYCHLOROQUINE SULFATE 200 MG: 200 TABLET, FILM COATED ORAL at 08:06

## 2018-06-25 RX ADMIN — HYDRALAZINE HYDROCHLORIDE 10 MG: 10 TABLET, FILM COATED ORAL at 21:37

## 2018-06-25 RX ADMIN — HYDRALAZINE HYDROCHLORIDE 10 MG: 10 TABLET, FILM COATED ORAL at 05:39

## 2018-06-25 RX ADMIN — SEVELAMER CARBONATE 800 MG: 800 TABLET, FILM COATED ORAL at 17:30

## 2018-06-25 RX ADMIN — CARVEDILOL 25 MG: 25 TABLET, FILM COATED ORAL at 08:06

## 2018-06-25 RX ADMIN — PREDNISONE 60 MG: 20 TABLET ORAL at 08:05

## 2018-06-25 ASSESSMENT — ENCOUNTER SYMPTOMS
HEADACHES: 0
WEAKNESS: 0
BACK PAIN: 1
DEPRESSION: 0
NECK PAIN: 0
SHORTNESS OF BREATH: 0
DOUBLE VISION: 0
VOMITING: 0
SPUTUM PRODUCTION: 0
WEAKNESS: 1
NAUSEA: 0
BRUISES/BLEEDS EASILY: 1
EYES NEGATIVE: 1
FEVER: 0
CHILLS: 0
MYALGIAS: 1
NAUSEA: 1
FOCAL WEAKNESS: 0
HEADACHES: 1
BLURRED VISION: 0
DIARRHEA: 0
PSYCHIATRIC NEGATIVE: 1
BLOOD IN STOOL: 0
FLANK PAIN: 1
CARDIOVASCULAR NEGATIVE: 1
RESPIRATORY NEGATIVE: 1
PALPITATIONS: 0
ABDOMINAL PAIN: 1
DIZZINESS: 0

## 2018-06-25 ASSESSMENT — PAIN SCALES - GENERAL
PAINLEVEL_OUTOF10: 6
PAINLEVEL_OUTOF10: 0
PAINLEVEL_OUTOF10: 2
PAINLEVEL_OUTOF10: 0
PAINLEVEL_OUTOF10: 3
PAINLEVEL_OUTOF10: 0
PAINLEVEL_OUTOF10: 8
PAINLEVEL_OUTOF10: 0
PAINLEVEL_OUTOF10: 8
PAINLEVEL_OUTOF10: 0
PAINLEVEL_OUTOF10: 4
PAINLEVEL_OUTOF10: 8
PAINLEVEL_OUTOF10: 4

## 2018-06-25 ASSESSMENT — PATIENT HEALTH QUESTIONNAIRE - PHQ9
SUM OF ALL RESPONSES TO PHQ9 QUESTIONS 1 AND 2: 0
2. FEELING DOWN, DEPRESSED, IRRITABLE, OR HOPELESS: NOT AT ALL
SUM OF ALL RESPONSES TO PHQ9 QUESTIONS 1 AND 2: 0
1. LITTLE INTEREST OR PLEASURE IN DOING THINGS: NOT AT ALL
1. LITTLE INTEREST OR PLEASURE IN DOING THINGS: NOT AT ALL
2. FEELING DOWN, DEPRESSED, IRRITABLE, OR HOPELESS: NOT AT ALL

## 2018-06-25 NOTE — PROGRESS NOTES
[facility] ICU Progress Note      Admit Date: 6/21/2018  ICU Day:  3      Resident(s): Drea Lxu  Attending: SHELLIE HARDY/ Dr. De La Torre    Date & Time:   6/25/2018   11:33 AM       Patient ID:    Name:             Mendy Solano   YOB: 1990  Age:                 28 y.o.  female   MRN:               [id number]    ID:  Mendy Solano is a 28 year old female with a history of lupus/lupus nephritis who presents S/P renal biopsy 6/21 with abdominal pain secondary to large perinephric and retroperitoneal hemorrhage and hematoma extending to the left lower quadrant and pelvis. CT also shows a large pericardial effusion in the setting of systemic lupus. She was started on medical therapy with prednisone, hydroxychloroquine and rituximab. Cardiology assessed patient and recommended to continue medical therapy.     Interval hisotry:  - Doing well, hemodynamically stable   - Tmax: 99.6  - UOP: 700+  - On RA   - Heme: Hgb stable at 7.1  - Stable for telemetry transfer     Consultants:  PMA: Dr. Barcenas/Wil   Nephrology: Dr. Gar/Abel   Cardiology: Dr. Vale     Procedures:  Arteriogram 6/22 No active extravasation   Hemodialysis catheter 6/22    Imaging:  ECHOCARDIOGRAM COMP W/O CONT   Final Result      IR-EMBOLIZATION   Final Result      1.  Left renal angiography showing no evidence of active extravasation, pseudoaneurysm, AV fistula, or other evidence of vascular injury. No coil embolization was indicated.                  INTERPRETING LOCATION: [address] [place] [address], [facility]      IR-CVC NON TUNNELED > AGE 4   Final Result      1. ULTRASOUND AND FLUOROSCOPIC GUIDED PLACEMENT OF A RIGHT INTERNAL JUGULAR 12 [place] TRIALYSIS TRIPLE LUMEN NON-TUNNELED HEMODIALYSIS CATHETER.      2. THE HEMODIALYSIS CATHETER MAY BE USED IMMEDIATELY AS CLINICALLY INDICATED. FLUSHES PER PROTOCOL.      US-RENAL   Final Result      Redemonstration of large hematoma anterior and lateral to the left kidney.       Moderate pelvic ascites. Trace perihepatic ascites.      CT-ABDOMEN-PELVIS W/O   Final Result         1. Large left perinephric space and left retroperitoneal hemorrhage and hematoma extending to the left lower quadrant and pelvis..      2. Large pericardial effusion. Small right pleural effusion.      3. Diffuse anasarca, abdominal and pelvic ascites.      CRITICAL RESULT READ BACK: Preliminary findings discussed with and critical read back performed by Dr. BIMAL SANTACRUZ in the Emergency Department via telephone on 6/21/2018 10:04 PM          Review of Systems   Constitutional: Negative for chills and fever.   HENT: Negative for congestion and nosebleeds.    Respiratory: Negative for sputum production and shortness of breath.    Cardiovascular: Negative for chest pain and palpitations.   Gastrointestinal: Positive for abdominal pain and nausea. Negative for blood in stool and melena.   Genitourinary: Positive for flank pain. Negative for dysuria, frequency, hematuria and urgency.   Musculoskeletal: Positive for back pain.   Skin: Negative for itching and rash.   Neurological: Positive for headaches. Negative for dizziness and weakness.   Endo/Heme/Allergies: Negative.    Psychiatric/Behavioral: Negative for depression and suicidal ideas.       PHYSICAL EXAM  Gen: NAD  HEENT: NC/AT, no scleral icterus, no conjunctival injection, mucous membranes pink and moist.  Cardiac: Tachycardia, audible S1S2  Respiratory: Diminished BS at bases b/l  Abdomen: BS+, soft, A, no rebound/guarding  Ext: Trace edema b/l  Skin: Warm, dry. No rashes or erythema.   Neuro: AAOx4, no focal deficits   Psych: Affect, mood, judgement normal.    Respiratory:     Respiration: (!) 33, Pulse Oximetry: 97 %    Chest Tube Drains:  None     HemoDynamics:  Pulse: 99, Heart Rate (Monitored): 95 NIBP: 149/91      Neuro:  None    Fluids:    Intake/Output Summary (Last 24 hours) at 06/22/18 9851  Last data filed at 06/22/18 0212   Gross per 24 hour    Intake              412 ml   Output                0 ml   Net              412 ml        Body mass index is 28.54 kg/m².    Recent Labs      18   2331  18   05   SODIUM   --   141  139  139   POTASSIUM   --   5.0  5.1  4.6   CHLORIDE   --   114*  109  110   CO2   --   18*  20  20   BUN   --   68*  69*  70*   CREATININE   --   3.44*  3.87*  3.78*   MAGNESIUM  2.1  2.1  2.1  2.3   PHOSPHORUS  8.4*  8.6*  9.4*   --    CALCIUM   --   8.2*  8.1*  7.9*       GI/Nutrition:  Recent Labs      18   05   GLUCOSE  108*  115*  106*       Heme:  Recent Labs      18   0518   0940  18   RBC  2.71*   --   2.31*  2.42*   HEMOGLOBIN  7.9*   --   7.0*  7.1*   HEMATOCRIT  24.3*   --   21.0*  22.6*   PLATELETCT  182   --   170  207   IRON  77   --    --    --    FERRITIN   --   339.1*   --    --    TOTIRONBC  204*   --    --    --        Infectious Disease:  Monitored Temp 2  Av.1 °C (98.7 °F)  Min: 36 °C (96.8 °F)  Max: 37.2 °C (99 °F)  Temp  Av.2 °C (99 °F)  Min: 36.9 °C (98.4 °F)  Max: 37.6 °C (99.6 °F)  Recent Labs      18   0543   WBC  11.4*  10.8  11.9*   NEUTSPOLYS   --   76.00*  75.80*   LYMPHOCYTES   --   15.10*  14.30*   MONOCYTES   --   7.00  7.00   EOSINOPHILS   --   0.00  0.20   BASOPHILS   --   0.10  0.30       Meds:  • amLODIPine  5 mg     • cyanocobalamin  1,000 mcg     • pneumococcal 13-Tessy Conj Vacc  0.5 mL     • carvedilol  25 mg     • isosorbide mononitrate SR  60 mg     • hydrALAZINE  10 mg     • sevelamer carbonate  800 mg     • diphenhydrAMINE  25 mg     • meperidine  25 mg     • hydrocortisone sodium succinate PF  100 mg     • HYDROcodone-acetaminophen  1 Tab     • hydroxychloroquine  200 mg     • predniSONE  60 mg     • senna-docusate  2 Tab      And   • polyethylene glycol/lytes  1 Packet      And   • magnesium hydroxide  30 mL       And   • bisacodyl  10 mg     • ondansetron  4 mg     • ondansetron  4 mg     • promethazine  12.5-25 mg     • promethazine  12.5-25 mg     • prochlorperazine  5-10 mg     • acetaminophen  650 mg     • glucose 4 g  16 g      And   • dextrose 50%  25 mL     • cefTRIAXone (ROCEPHIN) IV  2 g Stopped (06/25/18 0835)   • labetalol  10-20 mg     • hydrALAZINE  10-20 mg            Problem and Plan:    Asymptomatic bacteriuria  UA notable for bacteria, wbc, rbc   Ceftriaxone given immunosuppressed    Acute on Chronic Anemia due to traumatic retroperitoneal hematoma S/P renal biopsy   Stable   Transfuse Hgb <7   S/P 3u pRBC in ED for Hgb 6.8 on admission   TEG consistent with hypercoaguable state in the setting of lupus  SCDs only in the setting of acute bleed  Left renal angiography without active extravasation    Acute renal failure secondary to lupus nephritis   S/P Renal biopsy 6/21 consistent with lupus nephritis   Renal failure with Creatinine 3.4 with fluid overload and acidosis   Bicarbonate for acidosis  No IVF given clinically fluid overload  RIJ in place for dialysis PRN   Avoid nephrotoxic agents   Nephrology consult     Hyperkalemia  Resolved  Potassium 6 with renal failure  S/P lasix, insulin, bicarbonate and kayexalate     Hyperphosphatemia  Secondary to renal failure  Sevelamir   Goal phos <6    Anemia of chronic disease/autoimmune   Ferritin, iron studies, reticulocyte count    Pericardial effusion  Asymptomatic, no acute EKG changes  Echocardiogram     Renal anasarca  S/P fluids and PRBCs in the setting of renal failure   Strict I&O  2L Fluid restriction   Dialysis PRN, see Renal Failure for plan     Systemic lupus erythematosus with glomerular disease (HCC)  Mycophenate motifil, Hydroxychloroquine, Prednisone   Glomerular disease with lupus nephritis  Pericardial effusion with hemodynamically stability     Hypertension  Appropriately to maintain cardiac output in the setting of anemia   BP goal <  150/90   Labetolol BID   Hydralazine, Labetolol PRN     Acute on Chronic Anemia due to traumatic retroperitoneal hematoma S/P renal biopsy   CT ABD: Large left perinephric space and left retroperitoneal hemorrhage and hematoma extending to the left lower quadrant and pelvis (6/21)  S/P 3U pRBC in ED for Hgb 6.8 on admission   HB remains stable   Transfuse Hgb <7   SCDs for DVT prophylaxis     Asymptomatic bacteriuria  UA notable for bacteria, wbc, rbc   Ceftriaxone in setting of immunosuppressive therapy     Acute renal failure secondary to lupus nephritis   S/P Renal biopsy consistent with lupus nephritis (6/21)  Non-oliguric acute on chronic kidney injury   Stable kidney function since admission, no improvement   RIJ in place for possible dialysis   Avoid nephrotoxic agents   Follow nephrology rec's     Hypertension  BP goal < 150/90   Hydralazine, imdur, and amlodipine    Labetolol PRN   Controlled     Pericardial effusion  Echo: Large pericardial effusion with no evidence of hemodynamic compromise (6/24)  Hemodynamically stable, no evidence of tamponade   Suspect SLE induced vs uremia vs drug induced   Cardiology recommended medical therapy and to repeat echo     Hyperphosphatemia  Secondary to renal failure  Sevelamir   Goal phos <6    Renal anasarca  S/P fluids and PRBCs in the setting of acute kidney injury   2L Fluid restriction with Strict I&O  Seems to be improving   Will discuss diuretic therapy with nephro     Systemic lupus erythematosus with glomerular disease (HCC)  Mycophenate motifil, Hydroxychloroquine, Prednisone   Glomerular disease with lupus nephritis  Pericardial effusion with hemodynamically stability     Anemia of chronic disease/autoimmune   Iron studies consistent with ACD      Quality Measures:  Lines: PIV, RIJ       Desai Catheter: None    DVT Prophylaxis: SCDs; Anemia     Ulcer Prophylaxis: Famotidine    Antibiotics: Ceftriaxone        CODE STATUS: Full     DISPO: Intensive Care  Unit

## 2018-06-25 NOTE — ASSESSMENT & PLAN NOTE
S/P Renal biopsy consistent with lupus nephritis (6/21)  Labs 3/14/17 showed Cr 2.08 GFR 31  Cr 3+ currently  Non-oliguric acute on chronic kidney injury   HD catheter placed on 6/22  Stable kidney function since admission, no improvement   Plan  - Avoid nephrotoxic agents   - No current indications for HD but keep cath per Nephrology, recs appreciated

## 2018-06-25 NOTE — ASSESSMENT & PLAN NOTE
Presented with lupus nephritis and pericardial effusion   Protein:creatinine ratio 2560  Plan:  - Continue hydroxychloroquine, prednisone, rituximab per Nephrology, recs appreciated  - Outpatient rheumatology follow up

## 2018-06-25 NOTE — ASSESSMENT & PLAN NOTE
CT ABD: Large left perinephric space and left retroperitoneal hemorrhage and hematoma extending to the left lower quadrant and pelvis (6/21)  No evidence of extravasation, no amenable for IR embolization   S/P 3U pRBC in ED for Hgb 6.8 on admission   HB remains stable  Plan  - Transfuse Hgb <7   - SCDs for DVT prophylaxis   - Consider repeat CT if HGB starts to drop

## 2018-06-25 NOTE — ASSESSMENT & PLAN NOTE
Repeat echo: worse large pericardial effusion with some evidence of hemodynamic compromise  Hemodynamically stable, no intervention needed per Cardiology  Suspect SLE induced vs uremia vs drug induced   Plan  - Repeat echo outpatient per Cardiology, recs appreciated

## 2018-06-25 NOTE — ASSESSMENT & PLAN NOTE
S/P fluids and PRBCs in the setting of acute kidney injury   8+ kg weight gain since presentation  Plan  - 2L Fluid restriction with Strict I&O, daily weights  - continue Lasix 80 mg IV BID and metolazone per Nephrology, recs appreciated

## 2018-06-25 NOTE — ASSESSMENT & PLAN NOTE
BP goal < 150/90   Plan:  - Continue hydralazine, imdur  - Titrate amlodipine dose   - Labetolol PRN

## 2018-06-25 NOTE — CARE PLAN
Problem: Safety  Goal: Will remain free from injury  Outcome: PROGRESSING AS EXPECTED  Bed is locked and in lowest position with treaded socks on and call light within reach. Patient educated regarding safety. Verbalizes understanding and calls appropriately for assistance.     Problem: Knowledge Deficit  Goal: Knowledge of disease process/condition, treatment plan, diagnostic tests, and medications will improve  Outcome: PROGRESSING AS EXPECTED  Pt and family at bedside educated on POC, current medications and doses and disease process. All questions answered.

## 2018-06-25 NOTE — CONSULTS
"      Note Author: Rupa Rivas M.D.       Name Mendy Solano     1990   Age/Sex 28 y.o. 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     Interval hx  18 Pt has ongoing abdominal pain, but no hematuria, TEMP HD catheter placed yesterday  18 EPO received yesterday, ongoing abdominla pain, 1st dose of rituximab  18 no acute issues since yesterday, notes that her anasarca is much improved.     Review of Systems   Constitutional: Positive for malaise/fatigue.   HENT: Negative.    Eyes: Negative.    Respiratory: Negative.    Cardiovascular: Negative.    Gastrointestinal: Positive for abdominal pain and nausea. Negative for diarrhea and vomiting.   Genitourinary: Negative.    Musculoskeletal: Positive for myalgias. Negative for neck pain.   Skin: Negative.    Neurological: Positive for weakness. Negative for dizziness and headaches.   Endo/Heme/Allergies: Bruises/bleeds easily.   Psychiatric/Behavioral: Negative.            Past Medical History:   Past Medical History:   Diagnosis Date   • Anemia    • Hypertension    • IUD (intrauterine device) in place 2018   • Lupus    • Renal disorder 2018    \"Stage 3 kidney disease\"   • Ruptured ectopic pregnancy        Past Surgical History:  Past Surgical History:   Procedure Laterality Date   • PELVISCOPY  08    Performed by OTTO" "JEREMIAH WING at SURGERY [facility]   • LYSIS ADHESIONS GYN  8/30/08    Performed by JEREMIAH MENJIVAR at SURGERY [facility]       Current Outpatient Medications:  Home Medications     Reviewed by Valeria Collins (Pharmacy Tech) on 06/22/18 at 0930  Med List Status: Complete   Medication Last Dose Status   candesartan (ATACAND) 4 MG Tab  Active   ferrous sulfate 325 (65 Fe) MG tablet  Active   hydrocodone-acetaminophen (NORCO) 5-325 MG Tab per tablet 6/21/2018 Active   hydroxychloroquine (PLAQUENIL) 200 MG Tab 6/20/2018 Active   losartan (COZAAR) 25 MG Tab  Active   mycophenolate sodium (MYFORTIC) 360 MG Tablet Delayed Response tablet week Active   predniSONE (DELTASONE) 20 MG Tab 6/20/2018 Active   vitamin D, Ergocalciferol, (DRISDOL) 39993 units Cap capsule  Active                Medication Allergy/Sensitivities:  Allergies   Allergen Reactions   • Nkda [No Known Drug Allergy]      Family History:  Family History   Problem Relation Age of Onset   • Arthritis Mother      Social History:  Social History     Social History   • Marital status: Single     Spouse name: N/A   • Number of children: N/A   • Years of education: N/A     Occupational History   • Not on file.     Social History Main Topics   • Smoking status: Never Smoker   • Smokeless tobacco: Never Used   • Alcohol use No   • Drug use: No   • Sexual activity: Not on file     Other Topics Concern   • Not on file     Social History Narrative   • No narrative on file     Living situation: At home  PCP : Pcp Pt States None        Physical Exam     Vitals:    06/25/18 0600 06/25/18 0700 06/25/18 0800 06/25/18 0900   BP:       Pulse: 88 90 95 96   Resp: 14 13 15 20   Temp: 37.3 °C (99.2 °F)  37.3 °C (99.1 °F)    TempSrc:       SpO2: 96% 95% 95% 96%   Weight:       Height:         Body mass index is 28.54 kg/m².  /96   Pulse 96   Temp 37.3 °C (99.1 °F)   Resp 20   Ht 1.651 m (5' 5\")   Wt 77.8 kg (171 lb 8.3 oz)   LMP 05/28/2018 Comment: \"IUD " "in place\"  SpO2 96%   Breastfeeding? No   BMI 28.54 kg/m²   O2 therapy: Pulse Oximetry: 96 %, FiO2%: 21 %, O2 Delivery: None (Room Air)    Physical Exam   Constitutional: She is oriented to person, place, and time. No distress.   HENT:   Head: Normocephalic and atraumatic.   Hemodialysis catheter in place.    Eyes: No scleral icterus.   Neck:   RIght IJ temp cath   Cardiovascular: Normal rate, regular rhythm, normal heart sounds and intact distal pulses.  Exam reveals no gallop and no friction rub.    No murmur heard.  Pulmonary/Chest: Effort normal. No respiratory distress. She has no wheezes.   Diminished BS   Abdominal: Soft. She exhibits distension. There is tenderness. There is no rebound and no guarding.   Hypoactive bowel sounds, distended.  No flank bruising.        Musculoskeletal: Normal range of motion. She exhibits edema. She exhibits no tenderness or deformity.   Trace edema in her lower ext.    Lymphadenopathy:     She has no cervical adenopathy.   Neurological: She is alert and oriented to person, place, and time.   Skin: She is not diaphoretic.   Nursing note and vitals reviewed.            Data Review       Old Records Request:   Completed  Current Records review/summary: Completed    Lab Data Review:  Recent Results (from the past 24 hour(s))   ECHOCARDIOGRAM COMP W/O CONT    Collection Time: 06/24/18 11:05 AM   Result Value Ref Range    Eject.Frac. MOD BP 30.49     Eject.Frac. MOD 4C 37.23     Eject.Frac. MOD 2C 21.67     Left Ventrical Ejection Fraction 25    BASIC METABOLIC PANEL    Collection Time: 06/25/18  5:43 AM   Result Value Ref Range    Sodium 139 135 - 145 mmol/L    Potassium 4.6 3.6 - 5.5 mmol/L    Chloride 110 96 - 112 mmol/L    Co2 20 20 - 33 mmol/L    Glucose 106 (H) 65 - 99 mg/dL    Bun 70 (H) 8 - 22 mg/dL    Creatinine 3.78 (H) 0.50 - 1.40 mg/dL    Calcium 7.9 (L) 8.5 - 10.5 mg/dL    Anion Gap 9.0 0.0 - 11.9   MAGNESIUM    Collection Time: 06/25/18  5:43 AM   Result Value Ref " Range    Magnesium 2.3 1.5 - 2.5 mg/dL   CBC WITH DIFFERENTIAL    Collection Time: 18  5:43 AM   Result Value Ref Range    WBC 11.9 (H) 4.8 - 10.8 K/uL    RBC 2.42 (L) 4.20 - 5.40 M/uL    Hemoglobin 7.1 (L) 12.0 - 16.0 g/dL    Hematocrit 22.6 (L) 37.0 - 47.0 %    MCV 93.4 81.4 - 97.8 fL    MCH 29.3 27.0 - 33.0 pg    MCHC 31.4 (L) 33.6 - 35.0 g/dL    RDW 54.0 (H) 35.9 - 50.0 fL    Platelet Count 207 164 - 446 K/uL    MPV 10.2 9.0 - 12.9 fL    Neutrophils-Polys 75.80 (H) 44.00 - 72.00 %    Lymphocytes 14.30 (L) 22.00 - 41.00 %    Monocytes 7.00 0.00 - 13.40 %    Eosinophils 0.20 0.00 - 6.90 %    Basophils 0.30 0.00 - 1.80 %    Immature Granulocytes 2.40 (H) 0.00 - 0.90 %    Nucleated RBC 0.00 /100 WBC    Neutrophils (Absolute) 9.03 (H) 2.00 - 7.15 K/uL    Lymphs (Absolute) 1.70 1.00 - 4.80 K/uL    Monos (Absolute) 0.84 0.00 - 0.85 K/uL    Eos (Absolute) 0.02 0.00 - 0.51 K/uL    Baso (Absolute) 0.04 0.00 - 0.12 K/uL    Immature Granulocytes (abs) 0.29 (H) 0.00 - 0.11 K/uL    NRBC (Absolute) 0.00 K/uL   TROPONIN    Collection Time: 18  5:43 AM   Result Value Ref Range    Troponin I 0.02 0.00 - 0.04 ng/mL   ESTIMATED GFR    Collection Time: 18  5:43 AM   Result Value Ref Range    GFR If  17 (A) >60 mL/min/1.73 m 2    GFR If Non  14 (A) >60 mL/min/1.73 m 2   CRP QUANTITIVE (NON-CARDIAC)    Collection Time: 18  5:43 AM   Result Value Ref Range    Stat C-Reactive Protein 1.00 (H) 0.00 - 0.75 mg/dL   EKG (IP)    Collection Time: 18  6:47 AM   Result Value Ref Range    Report       [facility]    Test Date:  2018  Pt Name:    EJ AZAR              Department: [facility]  MRN:        [id number]                      Room:       [facility]  Gender:     Female                       Technician: TEAGAN  :        1990                   Requested By:MICHELLE RAMIREZ  Order #:    [id number]                    Reading MD: James Biggs MD    Measurements  Intervals                                 Axis  Rate:       93                           P:          80  ND:         160                          QRS:        71  QRSD:       94                           T:          100  QT:         368  QTc:        458    Interpretive Statements  SINUS RHYTHM  NONSPECIFIC T ABNORMALITIES, LATERAL LEADS  Compared to ECG 06/22/2018 08:05:06  No significant change.    Electronically Signed On 6- 7:50:13 PDT by James Biggs MD         Imaging/Procedures Review:    Independant Imaging Review: Completed  ECHOCARDIOGRAM COMP W/O CONT   Final Result      IR-EMBOLIZATION   Final Result      1.  Left renal angiography showing no evidence of active extravasation, pseudoaneurysm, AV fistula, or other evidence of vascular injury. No coil embolization was indicated.                  INTERPRETING LOCATION: [address], [place], [address]      IR-CVC NON TUNNELED > AGE 5   Final Result      1. ULTRASOUND AND FLUOROSCOPIC GUIDED PLACEMENT OF A RIGHT INTERNAL JUGULAR 12 [place] TRIALYSIS TRIPLE LUMEN NON-TUNNELED HEMODIALYSIS CATHETER.      2. THE HEMODIALYSIS CATHETER MAY BE USED IMMEDIATELY AS CLINICALLY INDICATED. FLUSHES PER PROTOCOL.      US-RENAL   Final Result      Redemonstration of large hematoma anterior and lateral to the left kidney.      Moderate pelvic ascites. Trace perihepatic ascites.      CT-ABDOMEN-PELVIS W/O   Final Result         1. Large left perinephric space and left retroperitoneal hemorrhage and hematoma extending to the left lower quadrant and pelvis..      2. Large pericardial effusion. Small right pleural effusion.      3. Diffuse anasarca, abdominal and pelvic ascites.      CRITICAL RESULT READ BACK: Preliminary findings discussed with and critical read back performed by Dr. BIMAL SANTACRUZ in the Emergency Department via telephone on 6/21/2018 10:04 PM                Assessment:     1)AWA  -Multifactorial likely worsening SLE nephritis (bx confirmed), and received contrast for  renal angio, which could have contributed to contrast induced nephropathy as well.   -Creatinine stable    -Avoid lasix in the setting of AWA for now    2)CKD stage III  -HD catheter in place  -secondary to lupus nephritis, Cr 2.08 on 05/22/2018.  - Proteinuria, rising anti-Ds DNA and low complements.  -Low Albumin 2.4  -ctm, no indication for RRT today    3)Hyperkalemia - resolved    4) Acute on chronic Anemia in the setting of large retroperitoneal hematoma Iatrogenic after left sided renal biopsy.  -s/p 3 units of PRBCS  -s/p IR guided arteriogram requiring no embolization.   -Remains hemodynamically stable.  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     8) Secondary hyperaparathyroidism  -Intact PTH 68 and Ca 7.9(corrected 8.7), albumin 2.4    9) Hyperphosphatemia  -Phosp 9.4  -on sevelamer    10) Acute on chronic Large Pericardial Effusion without hemodynamic compromise  -cardiology following  -ECHO shows EF of 25%,   -on coreg, imdur, hydralazine, and amlodipine    11) Dyslipidemia  - 2/2 to nephrotic syndrome    12) HTN-controlled  -currently on both candesartan and losartan, choose one ARB  -continue other bp medications.     Quality Measures  Quality-Core Measures   Reviewed items::  Labs reviewed, Medications reviewed and Radiology images reviewed    PCP: @PCP

## 2018-06-25 NOTE — CONSULTS
Consults     Admission Date / Service Date: 06/24/18    Patient's PCP: Pcp Pt States None    Consult requested by: Dr. Michael Barcenas    CC: Pericardial effusion and new reduced LV systolic function       HPI: Ms Solano is a pleasant 28 year female with know systemic lupus nephritis. She is her with her mother. She was diagnosed with kidney issues more than 1 year ago.  She has seen a cardiologist in the past year every 6 months for a known pericardial effusion but does not recall being told she had LVH or low EF.  She has acute blood loss anemia after kidney biopsy and retroperitoneal bleed. She received 3 units PRBCs.  Her echo shows a larger pericardial effusion without hemodynamic compromise and new low EF. CT scan had shown the effusion and small right pleural effusion.  She has been started on more aggressive immunosuppression with Rituximab. There is no family history of CMO. Her BP has been high for some time. She has never smoked.     She does not have chest pain, does have [facility] 4 dyspnea and anasarca including LE swelling. No significant palpitations. On Exam no significant pulsus paradoxus.     Creatinine is 3.87, K 5.1, Hgb 7    Data reviewed by me personally:  Current medications  Tele- sinus  ECG 6-22-18 sinus tach, 102, NS T wave  Changes lateral leads  Echo EF 25%, mild LVH, RVSP 38, large PE effusion without tamponade  CT 6-21-18 large PEff, small right pleural effusion      Medications / Drug list prior to admission:  No current facility-administered medications on file prior to encounter.      Current Outpatient Prescriptions on File Prior to Encounter   Medication Sig Dispense Refill   • hydrocodone-acetaminophen (NORCO) 5-325 MG Tab per tablet Take 1 Tab by mouth every 6 hours as needed. 30 Tab 0       Current list of administered Medications:    Current Facility-Administered Medications:   •  cyanocobalamin (VITAMIN B-12) tablet 1,000 mcg, 1,000 mcg, Oral, DAILY, Sheridan Vasquez M.D., 1,000 mcg at  06/24/18 0823  •  pneumococcal 13-Tessy Conj Vacc (PREVNAR 13) syringe 0.5 mL, 0.5 mL, Intramuscular, Once PRN, Andrews Barcenas M.D.  •  labetalol (NORMODYNE) tablet 200 mg, 200 mg, Oral, TWICE DAILY, Andrews Barcenas M.D., 200 mg at 06/24/18 0823  •  sevelamer carbonate (RENVELA) tablet 800 mg, 800 mg, Oral, TID WITH MEALS, Sheridan Vasquez M.D., 800 mg at 06/24/18 1740  •  acetaminophen (TYLENOL) tablet 650 mg, 650 mg, Oral, Once PRN, Nishant Roman M.D.  •  diphenhydrAMINE (BENADRYL) injection 25 mg, 25 mg, Intravenous, Once PRN, Nishant Roman M.D.  •  meperidine (DEMEROL) injection 25 mg, 25 mg, Intravenous, Once PRN, Nishant Roman M.D.  •  hydrocortisone sodium succinate PF (SOLU-CORTEF) 100 MG injection 100 mg, 100 mg, Intravenous, Once PRN, Nishant Roman M.D.  •  HYDROcodone-acetaminophen (NORCO) 5-325 MG per tablet 1 Tab, 1 Tab, Oral, Q6HRS PRN, Myah Frias M.D., 1 Tab at 06/23/18 1946  •  hydroxychloroquine (PLAQUENIL) tablet 200 mg, 200 mg, Oral, BID, Myah Frias M.D., 200 mg at 06/24/18 0823  •  predniSONE (DELTASONE) tablet 60 mg, 60 mg, Oral, DAILY, Myah Frias M.D., 60 mg at 06/24/18 0823  •  senna-docusate (PERICOLACE or SENOKOT S) 8.6-50 MG per tablet 2 Tab, 2 Tab, Oral, BID, Stopped at 06/22/18 2100 **AND** polyethylene glycol/lytes (MIRALAX) PACKET 1 Packet, 1 Packet, Oral, QDAY PRN **AND** magnesium hydroxide (MILK OF MAGNESIA) suspension 30 mL, 30 mL, Oral, QDAY PRN **AND** bisacodyl (DULCOLAX) suppository 10 mg, 10 mg, Rectal, QDAY PRN, Myah Frias M.D.  •  ondansetron (ZOFRAN) syringe/vial injection 4 mg, 4 mg, Intravenous, Q4HRS PRN, Myah Frias M.D., 4 mg at 06/23/18 0230  •  ondansetron (ZOFRAN ODT) dispertab 4 mg, 4 mg, Oral, Q4HRS PRN, Myah Frias M.D., Stopped at 06/22/18 0400  •  promethazine (PHENERGAN) tablet 12.5-25 mg, 12.5-25 mg, Oral, Q4HRS PRN, Myah Frias M.D.  •  promethazine (PHENERGAN) suppository 12.5-25 mg, 12.5-25 mg, Rectal, Q4HRS PRN, Myah Frias,  "M.D., 12.5 mg at 06/23/18 0332  •  prochlorperazine (COMPAZINE) injection 5-10 mg, 5-10 mg, Intravenous, Q4HRS PRN, Myah Frias M.D.  •  acetaminophen (TYLENOL) tablet 650 mg, 650 mg, Oral, Q6HRS PRN, Myah Frias M.D., 650 mg at 06/23/18 1204  •  Notify, , , Once **AND** glucose 4 g chewable tablet 16 g, 16 g, Oral, Q15 MIN PRN **AND** dextrose 50% (D50W) injection 25 mL, 25 mL, Intravenous, Q15 MIN PRN, Sheridan Vasquez M.D.  •  cefTRIAXone (ROCEPHIN) 2 g in  mL IVPB, 2 g, Intravenous, Q24HRS, Andrews Barcenas M.D., Stopped at 06/24/18 0842  •  labetalol (NORMODYNE,TRANDATE) injection 10-20 mg, 10-20 mg, Intravenous, Q4HRS PRN, Andrews Barcenas M.D., 20 mg at 06/24/18 1838  •  hydrALAZINE (APRESOLINE) injection 10-20 mg, 10-20 mg, Intravenous, Q4HRS PRN, Andrews Barcenas M.D., 20 mg at 06/24/18 1653    Past Medical History:   Diagnosis Date   • Anemia    • Hypertension    • IUD (intrauterine device) in place 06/20/2018   • Lupus    • Renal disorder 06/20/2018    \"Stage 3 kidney disease\"   • Ruptured ectopic pregnancy        Past Surgical History:   Procedure Laterality Date   • PELVISCOPY  8/30/08    Performed by JEREMIAH MENJIVAR at [facility]   • LYSIS ADHESIONS GYN  8/30/08    Performed by JEREMIAH MENJIVAR at [facility]       Family History   Problem Relation Age of Onset   • Arthritis Mother        Social History     Social History   • Marital status: Single     Spouse name: N/A   • Number of children: N/A   • Years of education: N/A     Occupational History   • Not on file.     Social History Main Topics   • Smoking status: Never Smoker   • Smokeless tobacco: Never Used   • Alcohol use No   • Drug use: No   • Sexual activity: Not on file     Other Topics Concern   • Not on file     Social History Narrative   • No narrative on file       Allergies   Allergen Reactions   • Nkda [No Known Drug Allergy]        Review of systems:  All others systems reviewed and negative.    Physical " exam:  Patient Vitals for the past 24 hrs:   Temp Pulse Resp SpO2 Weight   06/24/18 1830 - (!) 102 (!) 21 96 % -   06/24/18 1800 37 °C (98.6 °F) (!) 108 20 95 % -   06/24/18 1730 - (!) 103 20 97 % -   06/24/18 1700 37.1 °C (98.8 °F) 98 19 98 % -   06/24/18 1630 - 94 (!) 21 97 % -   06/24/18 1600 - 97 18 97 % -   06/24/18 1530 - 96 (!) 21 - -   06/24/18 1500 - 98 (!) 24 - -   06/24/18 1430 - 97 (!) 21 96 % -   06/24/18 1400 - (!) 101 20 97 % -   06/24/18 1330 - 99 (!) 23 96 % -   06/24/18 1300 - 97 19 95 % -   06/24/18 1230 - 100 20 97 % -   06/24/18 1200 - 95 (!) 22 98 % -   06/24/18 1130 - 94 17 97 % -   06/24/18 1100 - 98 (!) 22 96 % -   06/24/18 1030 - 98 18 95 % -   06/24/18 1000 - 95 (!) 22 96 % -   06/24/18 0930 - - - 96 % -   06/24/18 0900 - 98 20 98 % -   06/24/18 0830 - - - 99 % -   06/24/18 0800 - (!) 103 15 96 % -   06/24/18 0700 - (!) 101 16 95 % -   06/24/18 0600 - (!) 101 18 93 % 77.8 kg (171 lb 8.3 oz)   06/24/18 0500 - 100 17 96 % -   06/24/18 0400 - 100 19 98 % -   06/24/18 0300 - 98 16 98 % -   06/24/18 0200 - 95 14 93 % -   06/24/18 0100 - 92 15 95 % -   06/24/18 0000 - 95 20 95 % -   06/23/18 2300 - 93 (!) 23 95 % -   06/23/18 2200 - 97 20 96 % -   06/23/18 2100 - (!) 101 (!) 22 96 % -   06/23/18 2000 - 97 (!) 22 97 % -   06/23/18 1946 - (!) 106 (!) 22 98 % -   06/23/18 1945 - (!) 102 (!) 22 98 % -   06/23/18 1900 - (!) 102 (!) 23 96 % -       General: No acute distress. Well nourished.  HEENT: EOM grossly intact, no scleral icterus, no pharyngeal erythema.   Neck:  No JVD, no bruits, trachea midline  CVS: Fast, regular. Normal S1, S2. No M/R/G. Trace LE edema.  2+ radial pulses, 2+ DT pulses; pulsus paradoxus 10 mmHg.  Resp: CTAB. No wheezing or crackles/rhonchi. Normal respiratory effort.  Abdomen: Soft, NT, no ghanshyam hepatomegaly, + fluid.  MSK/Ext: No clubbing or cyanosis.  Skin: Warm and dry, no rashes.  Neurological: CN III-XII grossly intact. No focal deficits.   Psych: A&O x 3, appropriate  affect, good judgement    Data:  Laboratory studies:  Lab Results   Component Value Date/Time    WBC 10.8 06/24/2018 04:53 AM    RBC 2.31 (L) 06/24/2018 04:53 AM    HEMOGLOBIN 7.0 (L) 06/24/2018 04:53 AM    HEMATOCRIT 21.0 (L) 06/24/2018 04:53 AM    MCV 90.9 06/24/2018 04:53 AM    MCH 30.3 06/24/2018 04:53 AM    MCHC 33.3 (L) 06/24/2018 04:53 AM    MPV 10.4 06/24/2018 04:53 AM    NEUTSPOLYS 76.00 (H) 06/24/2018 04:53 AM    LYMPHOCYTES 15.10 (L) 06/24/2018 04:53 AM    MONOCYTES 7.00 06/24/2018 04:53 AM    EOSINOPHILS 0.00 06/24/2018 04:53 AM    BASOPHILS 0.10 06/24/2018 04:53 AM    HYPOCHROMIA 1+ 06/21/2018 08:44 PM    ANISOCYTOSIS 1+ 06/21/2018 08:44 PM        Lab Results   Component Value Date/Time    SODIUM 139 06/24/2018 04:53 AM    POTASSIUM 5.1 06/24/2018 04:53 AM    CHLORIDE 109 06/24/2018 04:53 AM    CO2 20 06/24/2018 04:53 AM    GLUCOSE 115 (H) 06/24/2018 04:53 AM    BUN 69 (H) 06/24/2018 04:53 AM    CREATININE 3.87 (H) 06/24/2018 04:53 AM        Recent Labs      06/21/18 1955   ASTSGOT  8*   ALTSGPT  5   TBILIRUBIN  0.3   ALKPHOSPHAT  30   GLOBULIN  3.9*   INR  1.12       Lab Results   Component Value Date/Time    PROTHROMBTM 14.1 06/21/2018 07:55 PM    INR 1.12 06/21/2018 07:55 PM        Imaging:  ECHOCARDIOGRAM COMP W/O CONT   Final Result      IR-EMBOLIZATION   Final Result      1.  Left renal angiography showing no evidence of active extravasation, pseudoaneurysm, AV fistula, or other evidence of vascular injury. No coil embolization was indicated.                  INTERPRETING LOCATION: [address], [place], [address]      IR-CVC NON TUNNELED > AGE 5   Final Result      1. ULTRASOUND AND FLUOROSCOPIC GUIDED PLACEMENT OF A RIGHT INTERNAL JUGULAR 12 [place] TRIALYSIS TRIPLE LUMEN NON-TUNNELED HEMODIALYSIS CATHETER.      2. THE HEMODIALYSIS CATHETER MAY BE USED IMMEDIATELY AS CLINICALLY INDICATED. FLUSHES PER PROTOCOL.      US-RENAL   Final Result      Redemonstration of large hematoma anterior and lateral  to the left kidney.      Moderate pelvic ascites. Trace perihepatic ascites.      CT-ABDOMEN-PELVIS W/O   Final Result         1. Large left perinephric space and left retroperitoneal hemorrhage and hematoma extending to the left lower quadrant and pelvis..      2. Large pericardial effusion. Small right pleural effusion.      3. Diffuse anasarca, abdominal and pelvic ascites.      CRITICAL RESULT READ BACK: Preliminary findings discussed with and critical read back performed by Dr. 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 Cardiomyopathy, assume nonischemic  2. LVH, could be infiltrative vs hypertensive heart disease  3. Large pericardial effusion, probably uremic or related to autoimmune disease  4. 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 HTN  6. AWA on CKD  7. Acute blood loss anemia due to post procedure retroperitoneal hematoma, Hgb 7, s/p 3 units PRBCs  8. Anasarca, ascites  9. Cardiorenal syndrome  10. 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 Immunocompromised, iatrogenic, Rituximab, prednisone    -She is already on immunosuppression  -Repeat echo in 2 days to monitor (Dr. Raya will order)  - TSH, ESR, CRP (the latter won't be normal, but will est baseline)  -She is on labetalol for BP control, will switch to coreg for  low EF benefit  -Add Imdur and hydralazine (if ok in terms of lupus) for vasodilation in place of ACE-I/ARB  -I think she needs diuresis such as lasix but will ask nephrology first  -Can add amlodipine for more BP control  -Consider cMRI but her GFR would have to improve to above 30 before gadolinium can be given safely  DW Dr. RODNEY Raya will see tomorrow      It is my pleasure to participate in the care of Ms. Solano.  Please do not hesitate to contact me with questions or concerns.    Luci Vale MD, [facility]  Cardiologist [facility]    Please note that this dictation was created using voice recognition software. I have made every reasonable attempt to correct obvious errors, but it is possible there are errors of grammar and possibly content that I did not discover before finalizing the note.

## 2018-06-25 NOTE — PROGRESS NOTES
[facility] ICU Progress Note      Admit Date: 6/21/2018  ICU Day:  2      Resident(s): Sheridan Vasquez  Attending: SHELLIE HARDY/ Dr. Barcenas     Date & Time:   6/24/2018   10:00 PM       Patient ID:    Name:             Mendy Solano   YOB: 1990  Age:                 28 y.o.  female   MRN:               [id number]    HPI:  Mendy Solano is a 28 year old female with a history of lupus/lupus nephritis who presents S/P renal biopsy 6/21 with abdominal pain secondary to large perinephric and retroperitoneal hemorrhage and hematoma extending to the left lower quadrant and pelvis. CT also shows a large pericardial effusion in the setting of systemic lupus. Nephrology placed tunneled catheter for dialysis indication being renal failure with fluid overload. She remains hemodynamically stable at this time. Arteriogram 6/22 did not show active extravasation.     Consultants:  PMA: Dr. Barcenas.   Nephrology: Dr. Gar.     Procedures:  Arteriogram 6/22 No active extravasation   Hemodialysis catheter 6/22    Imaging:  ECHOCARDIOGRAM COMP W/O CONT   Final Result      IR-EMBOLIZATION   Final Result      1.  Left renal angiography showing no evidence of active extravasation, pseudoaneurysm, AV fistula, or other evidence of vascular injury. No coil embolization was indicated.                  INTERPRETING LOCATION: [address] [place] [address], [facility]      IR-CVC NON TUNNELED > AGE 5   Final Result      1. ULTRASOUND AND FLUOROSCOPIC GUIDED PLACEMENT OF A RIGHT INTERNAL JUGULAR 12 [place] TRIALYSIS TRIPLE LUMEN NON-TUNNELED HEMODIALYSIS CATHETER.      2. THE HEMODIALYSIS CATHETER MAY BE USED IMMEDIATELY AS CLINICALLY INDICATED. FLUSHES PER PROTOCOL.      US-RENAL   Final Result      Redemonstration of large hematoma anterior and lateral to the left kidney.      Moderate pelvic ascites. Trace perihepatic ascites.      CT-ABDOMEN-PELVIS W/O   Final Result         1. Large left perinephric space and left retroperitoneal hemorrhage  and hematoma extending to the left lower quadrant and pelvis..      2. Large pericardial effusion. Small right pleural effusion.      3. Diffuse anasarca, abdominal and pelvic ascites.      CRITICAL RESULT READ BACK: Preliminary findings discussed with and critical read back performed by Dr. BIMAL SANTACRUZ in the Emergency Department via telephone on 6/21/2018 10:04 PM          Interval Events:  - HR   - -166/85-95  - T max 100.3  - Renal: RIJ placed for dialysis oliguric acute renal failure with fluid overload  - Heme: Hgb stable    Review of Systems   Constitutional: Negative for chills and fever.   HENT: Negative for congestion and nosebleeds.    Respiratory: Negative for sputum production and shortness of breath.    Cardiovascular: Negative for chest pain and palpitations.   Gastrointestinal: Positive for abdominal pain and nausea. Negative for blood in stool and melena.   Genitourinary: Positive for flank pain. Negative for dysuria, frequency, hematuria and urgency.   Musculoskeletal: Positive for back pain.   Skin: Negative for itching and rash.   Neurological: Positive for headaches. Negative for dizziness and weakness.   Endo/Heme/Allergies: Negative.    Psychiatric/Behavioral: Negative for depression and suicidal ideas.       PHYSICAL EXAM  Gen: NAD.   HEENT: NC/AT, no scleral icterus, no conjunctival injection, mucous membranes pink and moist.  Neck: Supple, no tracheal deviation.  Cardiac: S1S2, no m/r/g, JVD to jaw line.   Respiratory: Normal effort, symmetrical, CTA b/l.  Abdomen: BS+, soft, Anasarca, NT/ND, no rebound/guarding, no palpable organomegaly.  Ext: Trace edema b/l, 2+ DP pulses b/l.  Skin: Warm, dry. No rashes or erythema.   Neuro: AAOx4, CN II-XII grossly normal, no focal sensory or motor deficits.   Psych: Affect, mood, judgement normal.    Respiratory:     Respiration: (!) 21, Pulse Oximetry: 96 %    Chest Tube Drains:  None     HemoDynamics:  Pulse: 98, Heart Rate (Monitored):  97 NIBP: 159/95      Neuro:  None    Fluids:    Intake/Output Summary (Last 24 hours) at 18 0752  Last data filed at 18 0212   Gross per 24 hour   Intake              412 ml   Output                0 ml   Net              412 ml        Body mass index is 28.54 kg/m².    Recent Labs      18   2331  18   0518   0453   SODIUM  139   --   141  139   POTASSIUM  5.5   --   5.0  5.1   CHLORIDE  114*   --   114*  109   CO2  15*   --   18*  20   BUN  69*   --   68*  69*   CREATININE  3.76*   --   3.44*  3.87*   MAGNESIUM  2.2  2.1  2.1  2.1   PHOSPHORUS  8.2*  8.4*  8.6*  9.4*   CALCIUM  8.2*   --   8.2*  8.1*       GI/Nutrition:  Recent Labs      18   0453   GLUCOSE  126*  108*  115*       Heme:  Recent Labs      18   0940  18   0453   RBC  2.82*  2.71*   --   2.31*   HEMOGLOBIN  8.6*  7.9*   --   7.0*   HEMATOCRIT  25.1*  24.3*   --   21.0*   PLATELETCT  168  182   --   170   IRON   --   77   --    --    FERRITIN   --    --   339.1*   --    TOTIRONBC   --   204*   --    --        Infectious Disease:  Monitored Temp 2  Av.9 °C (98.5 °F)  Min: 36 °C (96.8 °F)  Max: 37.2 °C (99 °F)  Temp  Av.1 °C (98.7 °F)  Min: 37 °C (98.6 °F)  Max: 37.1 °C (98.8 °F)  Recent Labs      18   0453   WBC  6.1  11.4*  10.8   NEUTSPOLYS   --    --   76.00*   LYMPHOCYTES   --    --   15.10*   MONOCYTES   --    --   7.00   EOSINOPHILS   --    --   0.00   BASOPHILS   --    --   0.10       Meds:  • cyanocobalamin  1,000 mcg     • pneumococcal 13-Tessy Conj Vacc  0.5 mL     • carvedilol  25 mg     • isosorbide mononitrate SR  60 mg     • hydrALAZINE  10 mg     • sevelamer carbonate  800 mg     • acetaminophen  650 mg     • diphenhydrAMINE  25 mg     • meperidine  25 mg     • hydrocortisone sodium succinate PF  100 mg     • HYDROcodone-acetaminophen  1 Tab     •  hydroxychloroquine  200 mg     • predniSONE  60 mg     • senna-docusate  2 Tab      And   • polyethylene glycol/lytes  1 Packet      And   • magnesium hydroxide  30 mL      And   • bisacodyl  10 mg     • ondansetron  4 mg     • ondansetron  4 mg     • promethazine  12.5-25 mg     • promethazine  12.5-25 mg     • prochlorperazine  5-10 mg     • acetaminophen  650 mg     • glucose 4 g  16 g      And   • dextrose 50%  25 mL     • cefTRIAXone (ROCEPHIN) IV  2 g Stopped (06/24/18 0842)   • labetalol  10-20 mg     • hydrALAZINE  10-20 mg            Problem and Plan:    Asymptomatic bacteriuria  UA notable for bacteria, wbc, rbc   Ceftriaxone given immunosuppressed    Acute on Chronic Anemia due to traumatic retroperitoneal hematoma S/P renal biopsy   Stable   Transfuse Hgb <7   S/P 3u pRBC in ED for Hgb 6.8 on admission   TEG consistent with hypercoaguable state in the setting of lupus  SCDs only in the setting of acute bleed  Left renal angiography without active extravasation    Acute renal failure secondary to lupus nephritis   S/P Renal biopsy 6/21 consistent with lupus nephritis   Renal failure with Creatinine 3.4 with fluid overload and acidosis   Bicarbonate for acidosis  No IVF given clinically fluid overload  RIJ in place for dialysis PRN   Avoid nephrotoxic agents   Nephrology consult     Hyperkalemia  Resolved  Potassium 6 with renal failure  S/P lasix, insulin, bicarbonate and kayexalate     Hyperphosphatemia  Secondary to renal failure  Sevelamir   Goal phos <6    Anemia of chronic disease/autoimmune   Ferritin, iron studies, reticulocyte count    Pericardial effusion  Asymptomatic, no acute EKG changes  Echocardiogram     Renal anasarca  S/P fluids and PRBCs in the setting of renal failure   Strict I&O  2L Fluid restriction   Dialysis PRN, see Renal Failure for plan     Systemic lupus erythematosus with glomerular disease (HCC)  Mycophenate motifil, Hydroxychloroquine, Prednisone   Glomerular disease with  lupus nephritis  Pericardial effusion with hemodynamically stability     Hypertension  Appropriately to maintain cardiac output in the setting of anemia   BP goal < 150/90   Labetolol BID   Hydralazine, Labetolol PRN       Quality Measures:  Lines: PIV, RIJ       Desai Catheter: None    DVT Prophylaxis: SCDs; Anemia     Ulcer Prophylaxis: Famotidine for steroids     Antibiotics: Ceftriaxone        CODE STATUS: Full     DISPO: Intensive Care Unit

## 2018-06-25 NOTE — PROGRESS NOTES
Pulmonary Critical Care Progress Note      Admit Date: 6/21/2018    Chief Complaint: Perinephric hematoma    History of Present Illness: 28 yof with a PMHx of lupus, HTN and stage 3 CKD in ICU due to large perinephric hematoma on CT following renal biopsy.  Hgb was 6.8 then 6.4. She received 2 units PRBCs in the ER and has remained hemodynamically stable. IR --> no extravasation -->no embolization done.      Interval Events:  24 hour interval history reviewed   -Hypertensive overnight  -On RA  -Ambulating to commode  -Echo w/ large pericardial effusion; cardiology has been consulted; repeat 2-D echo in 48H  -Off Cellcept      Review of Systems   Constitutional: Negative for chills and fever.   HENT: Negative for congestion.    Eyes: Negative for blurred vision and double vision.   Respiratory: Negative for sputum production and shortness of breath.    Cardiovascular: Negative for chest pain and palpitations.   Gastrointestinal: Positive for abdominal pain. Negative for blood in stool, melena, nausea and vomiting.   Genitourinary: Negative for dysuria.   Neurological: Negative for focal weakness.   Psychiatric/Behavioral: Negative for depression.   All other systems reviewed and are negative.        Physical Exam   Constitutional: She appears well-developed and well-nourished.   HENT:   Head: Normocephalic and atraumatic.   Eyes: Conjunctivae are normal. Pupils are equal, round, and reactive to light.   Neck: No tracheal deviation present.   Cardiovascular: Normal rate and regular rhythm.    Pulmonary/Chest: She has no wheezes.   Diminished throughout   Abdominal: Soft. She exhibits no distension and no mass. There is tenderness. There is no rebound and no guarding.   Tenderness in LUQ/flank   Musculoskeletal: She exhibits no edema.   Neurological: No cranial nerve deficit.   Skin: Skin is warm and dry.   Psychiatric: She has a normal mood and affect.   Nursing note and vitals reviewed.  unchanged    PFSH:  No  change.    Respiratory:     Pulse Oximetry: 96 %  Chest Tube Drains:          HemoDynamics:  Pulse: 88, Heart Rate (Monitored): 88  NIBP: 139/78         Neuro:  RASS 0, able to ambulate to bathroom with assistance, normal affect, follows commands             Fluids:  Intake/Output       06/23/18 0700 - 06/24/18 0659 06/24/18 0700 - 06/25/18 0659 06/25/18 0700 - 06/26/18 0659      0700-1859 1900-0659 Total 0700-1859 1900-0659 Total 0700-1859 1900-0659 Total       Intake    P.O.  780  360 1140  760  400 1160  --  -- --    P.O.   -- -- --    I.V.  1000  -- 1000  110  -- 110  --  -- --    IV Piggyback Volume (IV Piggyback) 100 -- 100 100 -- 100 -- -- --    IV Volume (TKO) -- -- -- 10 -- 10 -- -- --    IV Volume (0.45% NS w/ Bicarb) 900 -- 900 -- -- -- -- -- --    Total Intake 3778 132 9490  -- -- --       Output    Urine  550  390 940  420  325 745  --  -- --    Number of Times Voided -- -- -- -- 2 x 2 x -- -- --    Urine Void (mL) (non-catheter) -- -- -- -- 325 325 -- -- --    Output (mL) ([REMOVED] Urinary Catheter Indwelling Catheter) 550 390 940 420 -- 420 -- -- --    Emesis  --  -- --  --  -- --  --  -- --    Emesis - Number of Times -- 1 x 1 x -- -- -- -- -- --    Stool  --  -- --  --  -- --  --  -- --    Number of Times Stooled -- 0 x 0 x -- 0 x 0 x -- -- --    Total Output 550 390 940 420 325 745 -- -- --       Net I/O     1230 -30 1200 450 75 525 -- -- --           Recent Labs      06/22/18   2331  06/23/18   0514  06/24/18   0453  06/25/18   0543   SODIUM   --   141  139  139   POTASSIUM   --   5.0  5.1  4.6   CHLORIDE   --   114*  109  110   CO2   --   18*  20  20   BUN   --   68*  69*  70*   CREATININE   --   3.44*  3.87*  3.78*   MAGNESIUM  2.1  2.1  2.1  2.3   PHOSPHORUS  8.4*  8.6*  9.4*   --    CALCIUM   --   8.2*  8.1*  7.9*       GI/Nutrition:    Liver Function  Recent Labs      06/23/18   0514  06/24/18   0453  06/25/18   0543   GLUCOSE  108*  115*  106*        Heme:  Recent Labs      18   0514  18   0940  18   0453  18   0543   RBC  2.71*   --   2.31*  2.42*   HEMOGLOBIN  7.9*   --   7.0*  7.1*   HEMATOCRIT  24.3*   --   21.0*  22.6*   PLATELETCT  182   --   170  207   IRON  77   --    --    --    FERRITIN   --   339.1*   --    --    TOTIRONBC  204*   --    --    --        Infectious Disease:  Monitored Temp 2  Av.1 °C (98.7 °F)  Min: 36 °C (96.8 °F)  Max: 37.2 °C (99 °F)  Temp  Av.2 °C (98.9 °F)  Min: 36.9 °C (98.4 °F)  Max: 37.6 °C (99.6 °F)  Micro: cultures reviewed  Recent Labs      18   0514  18   0453  18   0543   WBC  11.4*  10.8  11.9*   NEUTSPOLYS   --   76.00*  75.80*   LYMPHOCYTES   --   15.10*  14.30*   MONOCYTES   --   7.00  7.00   EOSINOPHILS   --   0.00  0.20   BASOPHILS   --   0.10  0.30     Current Facility-Administered Medications   Medication Dose Frequency Provider Last Rate Last Dose   • amLODIPine (NORVASC) tablet 5 mg  5 mg Q DAY Drea Lux M.D.   5 mg at 18 08   • cyanocobalamin (VITAMIN B-12) tablet 1,000 mcg  1,000 mcg DAILY Sheridan Vasquez M.D.   1,000 mcg at 18 08   • pneumococcal 13-Tessy Conj Vacc (PREVNAR 13) syringe 0.5 mL  0.5 mL Once PRN Andrews Barcenas M.D.       • carvedilol (COREG) tablet 25 mg  25 mg BID WITH MEALS Luci Vale M.D.   25 mg at 18 08   • isosorbide mononitrate SR (IMDUR) tablet 60 mg  60 mg Q DAY Luci Vale M.D.   60 mg at 18   • hydrALAZINE (APRESOLINE) tablet 10 mg  10 mg Q8HRS Luci Vale M.D.   10 mg at 18 0539   • sevelamer carbonate (RENVELA) tablet 800 mg  800 mg TID WITH MEALS Sheridan Vasquez M.D.   800 mg at 18 0807   • diphenhydrAMINE (BENADRYL) injection 25 mg  25 mg Once PRN Nishant Roman M.D.       • meperidine (DEMEROL) injection 25 mg  25 mg Once PRN Nishant Roman M.D.       • hydrocortisone sodium succinate PF (SOLU-CORTEF) 100 MG injection 100 mg  100 mg Once PRN  Nishant Roman M.D.       • HYDROcodone-acetaminophen (NORCO) 5-325 MG per tablet 1 Tab  1 Tab Q6HRS PRN Myah Frias M.D.   1 Tab at 06/25/18 0336   • hydroxychloroquine (PLAQUENIL) tablet 200 mg  200 mg BID Myah Frias M.D.   200 mg at 06/25/18 0806   • predniSONE (DELTASONE) tablet 60 mg  60 mg DAILY Myah Frias M.D.   60 mg at 06/25/18 0805   • senna-docusate (PERICOLACE or SENOKOT S) 8.6-50 MG per tablet 2 Tab  2 Tab BID Myah Frias M.D.   Stopped at 06/22/18 2100    And   • polyethylene glycol/lytes (MIRALAX) PACKET 1 Packet  1 Packet QDAY PRN Myah Frias M.D.        And   • magnesium hydroxide (MILK OF MAGNESIA) suspension 30 mL  30 mL QDAY PRN Myah Frias M.D.        And   • bisacodyl (DULCOLAX) suppository 10 mg  10 mg QDAY PRN Myah Frias M.D.       • ondansetron (ZOFRAN) syringe/vial injection 4 mg  4 mg Q4HRS PRN Myah Frias M.D.   4 mg at 06/23/18 0230   • ondansetron (ZOFRAN ODT) dispertab 4 mg  4 mg Q4HRS PRN Myah Frias M.D.   Stopped at 06/22/18 0400   • promethazine (PHENERGAN) tablet 12.5-25 mg  12.5-25 mg Q4HRS PRN Myah Frias M.D.       • promethazine (PHENERGAN) suppository 12.5-25 mg  12.5-25 mg Q4HRS PRN Myah Frias M.D.   12.5 mg at 06/23/18 0332   • prochlorperazine (COMPAZINE) injection 5-10 mg  5-10 mg Q4HRS PRN Myah Frias M.D.       • acetaminophen (TYLENOL) tablet 650 mg  650 mg Q6HRS PRN Myah Frias M.D.   Stopped at 06/25/18 0804   • glucose 4 g chewable tablet 16 g  16 g Q15 MIN PRN Sheridan Vasquez M.D.        And   • dextrose 50% (D50W) injection 25 mL  25 mL Q15 MIN PRN Sheridan Vasquez M.D.       • cefTRIAXone (ROCEPHIN) 2 g in  mL IVPB  2 g Q24HRS Andrews Barcenas M.D. 200 mL/hr at 06/25/18 0805 2 g at 06/25/18 0805   • labetalol (NORMODYNE,TRANDATE) injection 10-20 mg  10-20 mg Q4HRS PRN Andrews Barcenas M.D.   20 mg at 06/24/18 1838   • hydrALAZINE (APRESOLINE) injection 10-20 mg  10-20 mg Q4HRS PRN Andrews Barcenas M.D.   20 mg at 06/24/18 2202      Last reviewed on 6/22/2018  9:30 AM by Valeria Collins    Quality  Measures:  Radiology images reviewed, Labs reviewed and Medications reviewed                    Assessment/Plan:  Post-procedural perinephric hematoma s/P IR Arteriography 6/22 - no ongoing bleed noted.  Hgb stable over last 24H  -Follow     Large pericardial effusion  -Repeat echo as per cardiology     Acute on stage III CKD 2/2 Class IV-S and class V lupus nephritis  -HD as per nephrology  -Rituxan #1 6/24  -Continue prednisone/hydroxychlorquine  -Mycophenolate stopped 6/23    Hyperkalemia-resolved  -Follow     HTN  -Continue nitrates, hydralazine, labetalol, carvedilol    Disposition  -Awaiting telemetry    Discussed patient condition and risk of morbidity and/or mortality with RN and Pharmacy.

## 2018-06-26 LAB
ANION GAP SERPL CALC-SCNC: 8 MMOL/L (ref 0–11.9)
BASOPHILS # BLD AUTO: 0.2 % (ref 0–1.8)
BASOPHILS # BLD: 0.02 K/UL (ref 0–0.12)
BUN SERPL-MCNC: 81 MG/DL (ref 8–22)
CALCIUM SERPL-MCNC: 8.3 MG/DL (ref 8.5–10.5)
CHLORIDE SERPL-SCNC: 108 MMOL/L (ref 96–112)
CO2 SERPL-SCNC: 21 MMOL/L (ref 20–33)
CREAT SERPL-MCNC: 3.49 MG/DL (ref 0.5–1.4)
EOSINOPHIL # BLD AUTO: 0.01 K/UL (ref 0–0.51)
EOSINOPHIL NFR BLD: 0.1 % (ref 0–6.9)
ERYTHROCYTE [DISTWIDTH] IN BLOOD BY AUTOMATED COUNT: 52.3 FL (ref 35.9–50)
GLUCOSE SERPL-MCNC: 100 MG/DL (ref 65–99)
HCT VFR BLD AUTO: 23.2 % (ref 37–47)
HGB BLD-MCNC: 7.2 G/DL (ref 12–16)
IMM GRANULOCYTES # BLD AUTO: 0.32 K/UL (ref 0–0.11)
IMM GRANULOCYTES NFR BLD AUTO: 2.9 % (ref 0–0.9)
LYMPHOCYTES # BLD AUTO: 1.47 K/UL (ref 1–4.8)
LYMPHOCYTES NFR BLD: 13.4 % (ref 22–41)
MAGNESIUM SERPL-MCNC: 2.3 MG/DL (ref 1.5–2.5)
MCH RBC QN AUTO: 29.3 PG (ref 27–33)
MCHC RBC AUTO-ENTMCNC: 31 G/DL (ref 33.6–35)
MCV RBC AUTO: 94.3 FL (ref 81.4–97.8)
MONOCYTES # BLD AUTO: 0.69 K/UL (ref 0–0.85)
MONOCYTES NFR BLD AUTO: 6.3 % (ref 0–13.4)
NEUTROPHILS # BLD AUTO: 8.49 K/UL (ref 2–7.15)
NEUTROPHILS NFR BLD: 77.1 % (ref 44–72)
NRBC # BLD AUTO: 0 K/UL
NRBC BLD-RTO: 0 /100 WBC
PLATELET # BLD AUTO: 240 K/UL (ref 164–446)
PMV BLD AUTO: 10.2 FL (ref 9–12.9)
POTASSIUM SERPL-SCNC: 5 MMOL/L (ref 3.6–5.5)
RBC # BLD AUTO: 2.46 M/UL (ref 4.2–5.4)
SODIUM SERPL-SCNC: 137 MMOL/L (ref 135–145)
WBC # BLD AUTO: 11 K/UL (ref 4.8–10.8)

## 2018-06-26 PROCEDURE — 700102 HCHG RX REV CODE 250 W/ 637 OVERRIDE(OP): Performed by: INTERNAL MEDICINE

## 2018-06-26 PROCEDURE — 700102 HCHG RX REV CODE 250 W/ 637 OVERRIDE(OP): Performed by: HOSPITALIST

## 2018-06-26 PROCEDURE — 700105 HCHG RX REV CODE 258: Performed by: INTERNAL MEDICINE

## 2018-06-26 PROCEDURE — 85025 COMPLETE CBC W/AUTO DIFF WBC: CPT

## 2018-06-26 PROCEDURE — 770020 HCHG ROOM/CARE - TELE (206)

## 2018-06-26 PROCEDURE — A9270 NON-COVERED ITEM OR SERVICE: HCPCS | Performed by: INTERNAL MEDICINE

## 2018-06-26 PROCEDURE — 80048 BASIC METABOLIC PNL TOTAL CA: CPT

## 2018-06-26 PROCEDURE — 83735 ASSAY OF MAGNESIUM: CPT

## 2018-06-26 PROCEDURE — A9270 NON-COVERED ITEM OR SERVICE: HCPCS | Performed by: HOSPITALIST

## 2018-06-26 PROCEDURE — 700111 HCHG RX REV CODE 636 W/ 250 OVERRIDE (IP): Performed by: INTERNAL MEDICINE

## 2018-06-26 PROCEDURE — A9270 NON-COVERED ITEM OR SERVICE: HCPCS | Performed by: STUDENT IN AN ORGANIZED HEALTH CARE EDUCATION/TRAINING PROGRAM

## 2018-06-26 PROCEDURE — 700111 HCHG RX REV CODE 636 W/ 250 OVERRIDE (IP): Performed by: HOSPITALIST

## 2018-06-26 PROCEDURE — 700102 HCHG RX REV CODE 250 W/ 637 OVERRIDE(OP): Performed by: STUDENT IN AN ORGANIZED HEALTH CARE EDUCATION/TRAINING PROGRAM

## 2018-06-26 RX ORDER — AMLODIPINE BESYLATE 5 MG/1
7.5 TABLET ORAL
Status: DISCONTINUED | OUTPATIENT
Start: 2018-06-26 | End: 2018-06-26

## 2018-06-26 RX ORDER — LOSARTAN POTASSIUM 25 MG/1
25 TABLET ORAL
Status: DISCONTINUED | OUTPATIENT
Start: 2018-06-27 | End: 2018-06-27

## 2018-06-26 RX ADMIN — PREDNISONE 60 MG: 20 TABLET ORAL at 08:01

## 2018-06-26 RX ADMIN — ISOSORBIDE MONONITRATE 60 MG: 30 TABLET ORAL at 08:04

## 2018-06-26 RX ADMIN — HYDROXYCHLOROQUINE SULFATE 200 MG: 200 TABLET, FILM COATED ORAL at 08:05

## 2018-06-26 RX ADMIN — SEVELAMER CARBONATE 800 MG: 800 TABLET, FILM COATED ORAL at 12:09

## 2018-06-26 RX ADMIN — Medication 1000 MCG: at 08:01

## 2018-06-26 RX ADMIN — HYDROCODONE BITARTRATE AND ACETAMINOPHEN 1 TABLET: 5; 325 TABLET ORAL at 19:14

## 2018-06-26 RX ADMIN — CEFTRIAXONE 2 G: 2 INJECTION, POWDER, FOR SOLUTION INTRAMUSCULAR; INTRAVENOUS at 08:01

## 2018-06-26 RX ADMIN — AMLODIPINE BESYLATE 7.5 MG: 5 TABLET ORAL at 08:02

## 2018-06-26 RX ADMIN — SEVELAMER CARBONATE 800 MG: 800 TABLET, FILM COATED ORAL at 18:00

## 2018-06-26 RX ADMIN — ACETAMINOPHEN 650 MG: 325 TABLET, FILM COATED ORAL at 08:14

## 2018-06-26 RX ADMIN — HYDROXYCHLOROQUINE SULFATE 200 MG: 200 TABLET, FILM COATED ORAL at 18:05

## 2018-06-26 RX ADMIN — CARVEDILOL 25 MG: 25 TABLET, FILM COATED ORAL at 18:00

## 2018-06-26 RX ADMIN — HYDRALAZINE HYDROCHLORIDE 10 MG: 10 TABLET, FILM COATED ORAL at 13:59

## 2018-06-26 RX ADMIN — HYDRALAZINE HYDROCHLORIDE 10 MG: 10 TABLET, FILM COATED ORAL at 22:18

## 2018-06-26 RX ADMIN — CARVEDILOL 25 MG: 25 TABLET, FILM COATED ORAL at 07:58

## 2018-06-26 RX ADMIN — DOCUSATE SODIUM AND SENNOSIDES 2 TABLET: 8.6; 5 TABLET, FILM COATED ORAL at 18:01

## 2018-06-26 RX ADMIN — ACETAMINOPHEN 650 MG: 325 TABLET, FILM COATED ORAL at 15:26

## 2018-06-26 RX ADMIN — SEVELAMER CARBONATE 800 MG: 800 TABLET, FILM COATED ORAL at 07:59

## 2018-06-26 RX ADMIN — HYDRALAZINE HYDROCHLORIDE 10 MG: 10 TABLET, FILM COATED ORAL at 05:55

## 2018-06-26 ASSESSMENT — PAIN SCALES - GENERAL
PAINLEVEL_OUTOF10: 6
PAINLEVEL_OUTOF10: 4
PAINLEVEL_OUTOF10: 6
PAINLEVEL_OUTOF10: 0
PAINLEVEL_OUTOF10: 7

## 2018-06-26 ASSESSMENT — ENCOUNTER SYMPTOMS
SHORTNESS OF BREATH: 0
NECK PAIN: 0
ABDOMINAL PAIN: 1
CHILLS: 0
DEPRESSION: 0
FLANK PAIN: 1
FOCAL WEAKNESS: 0
BLOOD IN STOOL: 0
EYES NEGATIVE: 1
NAUSEA: 1
COUGH: 0
DIZZINESS: 0
BACK PAIN: 1
CARDIOVASCULAR NEGATIVE: 1
SPUTUM PRODUCTION: 0
BRUISES/BLEEDS EASILY: 0
RESPIRATORY NEGATIVE: 1
MYALGIAS: 0
WEAKNESS: 1
PALPITATIONS: 0
DIARRHEA: 0
WEAKNESS: 0
PSYCHIATRIC NEGATIVE: 1
HEADACHES: 0
NAUSEA: 0
DOUBLE VISION: 0
FEVER: 0
BLURRED VISION: 0
VOMITING: 0

## 2018-06-26 ASSESSMENT — PATIENT HEALTH QUESTIONNAIRE - PHQ9
2. FEELING DOWN, DEPRESSED, IRRITABLE, OR HOPELESS: NOT AT ALL
1. LITTLE INTEREST OR PLEASURE IN DOING THINGS: NOT AT ALL
SUM OF ALL RESPONSES TO PHQ9 QUESTIONS 1 AND 2: 0

## 2018-06-26 NOTE — CONSULTS
"      Note Author: Rupa Rivas M.D.       Name Mendy Solano 1990   Age/Sex 28 y.o. 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     Interval hx  18 Pt has ongoing abdominal pain, but no hematuria, TEMP HD catheter placed yesterday  18 EPO received yesterday, ongoing abdominla pain, 1st dose of rituximab  18 no acute issues since yesterday, notes that her anasarca is much improved.   18 Episode of vomitingx1 yesterday. No other acute issues. I/O: 100/250    Review of Systems   Constitutional: Negative for malaise/fatigue.   HENT: Negative.    Eyes: Negative.    Respiratory: Negative.    Cardiovascular: Negative.    Gastrointestinal: Positive for abdominal pain and nausea. Negative for diarrhea and vomiting.        Improving abd pain   Genitourinary: Negative.  Negative for dysuria and urgency.   Musculoskeletal: Negative for myalgias and neck pain.   Skin: Negative.    Neurological: Positive for weakness. Negative for dizziness and headaches.   Endo/Heme/Allergies: Does not bruise/bleed easily.   Psychiatric/Behavioral: Negative.            Past Medical History:   Past Medical History:   Diagnosis Date   • Anemia    • Hypertension    • IUD (intrauterine device) in place 2018   • Lupus    • Renal disorder 2018    \"Stage 3 kidney disease\"   • Ruptured ectopic pregnancy  "       Past Surgical History:  Past Surgical History:   Procedure Laterality Date   • PELVISCOPY  8/30/08    Performed by JEREMIAH MENJIVAR at [facility]   • LYSIS ADHESIONS GYN  8/30/08    Performed by JEREMIAH MENJIVAR at [facility]       Current Outpatient Medications:  Home Medications     Reviewed by Valeria Collins (Pharmacy Tech) on 06/22/18 at 0930  Med List Status: Complete   Medication Last Dose Status   candesartan (ATACAND) 4 MG Tab  Active   ferrous sulfate 325 (65 Fe) MG tablet  Active   hydrocodone-acetaminophen (NORCO) 5-325 MG Tab per tablet 6/21/2018 Active   hydroxychloroquine (PLAQUENIL) 200 MG Tab 6/20/2018 Active   losartan (COZAAR) 25 MG Tab  Active   mycophenolate sodium (MYFORTIC) 360 MG Tablet Delayed Response tablet week Active   predniSONE (DELTASONE) 20 MG Tab 6/20/2018 Active   vitamin D, Ergocalciferol, (DRISDOL) 02342 units Cap capsule  Active                Medication Allergy/Sensitivities:  Allergies   Allergen Reactions   • Nkda [No Known Drug Allergy]      Family History:  Family History   Problem Relation Age of Onset   • Arthritis Mother      Social History:  Social History     Social History   • Marital status: Single     Spouse name: N/A   • Number of children: N/A   • Years of education: N/A     Occupational History   • Not on file.     Social History Main Topics   • Smoking status: Never Smoker   • Smokeless tobacco: Never Used   • Alcohol use No   • Drug use: No   • Sexual activity: Not on file     Other Topics Concern   • Not on file     Social History Narrative   • No narrative on file     Living situation: At home  PCP : Pcp Pt States None        Physical Exam     Vitals:    06/26/18 0500 06/26/18 0700 06/26/18 0800 06/26/18 0912   BP:       Pulse: 91 99 100 84   Resp: 15  15 14   Temp:   36.9 °C (98.5 °F)    TempSrc:       SpO2: 96%  96% 95%   Weight:       Height:         Body mass index is 28.54 kg/m².  /96   Pulse 84   Temp 36.9  "°C (98.5 °F)   Resp 14   Ht 1.651 m (5' 5\")   Wt 77.8 kg (171 lb 8.3 oz)   LMP 05/28/2018 Comment: \"IUD in place\"  SpO2 95%   Breastfeeding? No   BMI 28.54 kg/m²   O2 therapy: Pulse Oximetry: 95 %, O2 Delivery: None (Room Air)    Physical Exam   Constitutional: She is oriented to person, place, and time. No distress.   HENT:   Head: Normocephalic and atraumatic.   Hemodialysis catheter in place.    Eyes: No scleral icterus.   Neck:   RIght IJ temp cath   Cardiovascular: Normal rate, regular rhythm, normal heart sounds and intact distal pulses.  Exam reveals no gallop and no friction rub.    No murmur heard.  Pulmonary/Chest: Effort normal. No respiratory distress. She has no wheezes.   Diminished BS   Abdominal: Soft. She exhibits no distension. There is tenderness. There is no rebound and no guarding.   Mild tenderness on the LLQ, improved from yesterday        Musculoskeletal: Normal range of motion. She exhibits no edema, tenderness or deformity.       Lymphadenopathy:     She has no cervical adenopathy.   Neurological: She is alert and oriented to person, place, and time.   Skin: Skin is warm and dry. She is not diaphoretic.   Nursing note and vitals reviewed.            Data Review       Old Records Request:   Completed  Current Records review/summary: Completed    Lab Data Review:  Recent Results (from the past 24 hour(s))   CRP HIGH SENSITIVE (CARDIAC)    Collection Time: 06/25/18 10:53 AM   Result Value Ref Range    C Reactive Protein High Sensitive 9.5 (H) 0.0 - 7.5 mg/L   TSH WITH REFLEX TO FT4    Collection Time: 06/25/18 10:53 AM   Result Value Ref Range    TSH 4.080 0.380 - 5.330 uIU/mL   WESTERGREN SED RATE    Collection Time: 06/25/18 10:53 AM   Result Value Ref Range    Sed Rate Westergren 41 (H) 0 - 20 mm/hour   CBC WITH DIFFERENTIAL    Collection Time: 06/26/18  4:35 AM   Result Value Ref Range    WBC 11.0 (H) 4.8 - 10.8 K/uL    RBC 2.46 (L) 4.20 - 5.40 M/uL    Hemoglobin 7.2 (L) 12.0 - 16.0 " g/dL    Hematocrit 23.2 (L) 37.0 - 47.0 %    MCV 94.3 81.4 - 97.8 fL    MCH 29.3 27.0 - 33.0 pg    MCHC 31.0 (L) 33.6 - 35.0 g/dL    RDW 52.3 (H) 35.9 - 50.0 fL    Platelet Count 240 164 - 446 K/uL    MPV 10.2 9.0 - 12.9 fL    Neutrophils-Polys 77.10 (H) 44.00 - 72.00 %    Lymphocytes 13.40 (L) 22.00 - 41.00 %    Monocytes 6.30 0.00 - 13.40 %    Eosinophils 0.10 0.00 - 6.90 %    Basophils 0.20 0.00 - 1.80 %    Immature Granulocytes 2.90 (H) 0.00 - 0.90 %    Nucleated RBC 0.00 /100 WBC    Neutrophils (Absolute) 8.49 (H) 2.00 - 7.15 K/uL    Lymphs (Absolute) 1.47 1.00 - 4.80 K/uL    Monos (Absolute) 0.69 0.00 - 0.85 K/uL    Eos (Absolute) 0.01 0.00 - 0.51 K/uL    Baso (Absolute) 0.02 0.00 - 0.12 K/uL    Immature Granulocytes (abs) 0.32 (H) 0.00 - 0.11 K/uL    NRBC (Absolute) 0.00 K/uL   BASIC METABOLIC PANEL    Collection Time: 06/26/18  4:35 AM   Result Value Ref Range    Sodium 137 135 - 145 mmol/L    Potassium 5.0 3.6 - 5.5 mmol/L    Chloride 108 96 - 112 mmol/L    Co2 21 20 - 33 mmol/L    Glucose 100 (H) 65 - 99 mg/dL    Bun 81 (HH) 8 - 22 mg/dL    Creatinine 3.49 (H) 0.50 - 1.40 mg/dL    Calcium 8.3 (L) 8.5 - 10.5 mg/dL    Anion Gap 8.0 0.0 - 11.9   MAGNESIUM    Collection Time: 06/26/18  4:35 AM   Result Value Ref Range    Magnesium 2.3 1.5 - 2.5 mg/dL   ESTIMATED GFR    Collection Time: 06/26/18  4:35 AM   Result Value Ref Range    GFR If  19 (A) >60 mL/min/1.73 m 2    GFR If Non African American 15 (A) >60 mL/min/1.73 m 2       Imaging/Procedures Review:    Independant Imaging Review: Completed  ECHOCARDIOGRAM COMP W/O CONT   Final Result      IR-EMBOLIZATION   Final Result      1.  Left renal angiography showing no evidence of active extravasation, pseudoaneurysm, AV fistula, or other evidence of vascular injury. No coil embolization was indicated.                  INTERPRETING LOCATION: [address] [place] [address], [place], [facility]      IR-CVC NON TUNNELED > AGE 5   Final Result      1. ULTRASOUND AND  FLUOROSCOPIC GUIDED PLACEMENT OF A RIGHT INTERNAL JUGULAR 12 English TRIALYSIS TRIPLE LUMEN NON-TUNNELED HEMODIALYSIS CATHETER.      2. THE HEMODIALYSIS CATHETER MAY BE USED IMMEDIATELY AS CLINICALLY INDICATED. FLUSHES PER PROTOCOL.      US-RENAL   Final Result      Redemonstration of large hematoma anterior and lateral to the left kidney.      Moderate pelvic ascites. Trace perihepatic ascites.      CT-ABDOMEN-PELVIS W/O   Final Result         1. Large left perinephric space and left retroperitoneal hemorrhage and hematoma extending to the left lower quadrant and pelvis..      2. Large pericardial effusion. Small right pleural effusion.      3. Diffuse anasarca, abdominal and pelvic ascites.      CRITICAL RESULT READ BACK: Preliminary findings discussed with and critical read back performed by Dr. BIMAL SANTACRUZ in the Emergency Department via telephone on 6/21/2018 10:04 PM                Assessment:     1)AWA  -Multifactorial likely worsening SLE nephritis (bx confirmed), and received contrast for renal angio, which could have contributed to contrast induced nephropathy as well.   -Creatinine stable and improving  -Avoid lasix in the setting of AWA for now    2)CKD stage III  -HD catheter in place-can remove the HD catheter as no indication for RRT at this time  -secondary to lupus nephritis, Cr 2.08 on 05/22/2018.  - Proteinuria, rising anti-Ds DNA and low complements.  -Low Albumin 2.4    3)Hyperkalemia - resolved    4) Acute on chronic Anemia in the setting of large retroperitoneal hematoma Iatrogenic after left sided renal biopsy.  -s/p 3 units of PRBCS  -s/p IR guided arteriogram requiring no embolization.   -Remains hemodynamically stable.  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     8) Secondary hyperaparathyroidism  -Intact PTH 68 and Ca 7.9(corrected 8.7), albumin 2.4    9) Hyperphosphatemia  -Phosp 9.4  -on sevelamer    10) Acute on chronic Large Pericardial Effusion without hemodynamic compromise  -cardiology following  -ECHO shows EF of 25%,   -on coreg, imdur, hydralazine, and amlodipine    11) Dyslipidemia  - 2/2 to nephrotic syndrome    12) HTN-controlled  -had been on both candesartan and losartan, now not on any  -given proteinuria in the urine, would choose one ARB, would also be helpful with blood pressure managed  -continue other bp medications.     Quality Measures  Quality-Core Measures   Reviewed items::  Labs reviewed, Medications reviewed and Radiology images reviewed    PCP: @PCP

## 2018-06-26 NOTE — PROGRESS NOTES
Transport at bedside. Patient transferred per wheelchair on ICU portable monitor escorted by ICU RN and transport. Chart and meds sent with patient  Patricia Rothman R.N.

## 2018-06-26 NOTE — CARE PLAN
Problem: Pain Management  Goal: Pain level will decrease to patient's comfort goal    Intervention: Follow pain managment plan developed in collaboration with patient and Interdisciplinary Team  PRN pain medications for headache pain given on day shift. Patient denies having pain at this time. Will continue to monitor       Problem: Oxygenation/Respiratory Function  Goal: Patient will Achieve/Maintain Optimum Respiratory Rate/Effort    Intervention: Assess/Monitor Rate/Rhythm/Depth of effort of respirations  Patient 96% on room air. Lungs are clear throughout

## 2018-06-26 NOTE — PROGRESS NOTES
Pt assigned tele bed 731 bed 1. Report called to tele RN. All questions answered. Waiting on transport for transfer.  Patricia Rothman R.N.

## 2018-06-26 NOTE — DIETARY
"Nutrition services: Day 4 of admit.  Mendy Solano is a 28 y.o. female with admitting DX of Lupus  Pt noted with nutrition admit screen trigger: unplanned wt loss PTA \"d/t new lupus condition\"     Assessment:  Height: 165.1 cm (5' 5\")  Weight: 77.8 kg (171 lb 8.3 oz) per bed scale wt on 6/22  Body mass index is 28.54 kg/m². - overweight   Diet/Intake: Renal diet with 2L FR - variable PO per ADLs     Evaluation:   1. Per chart review, pt was dx with Lupus in 2016   2. Noted with stage III CKD; receiving HD   3. Serum Glucose 100, BUN 81, creat 3.49, CRP 9.5  4. Receiving Vitamin B-12, Renvela   5. Last BM 6/25  6. No skin breakdown noted at this time     Recommendations/Plan:  1. Encourage PO Intake   2. Document all PO intake as % taken in ADL's to provide interdisciplinary communication across all shifts.   3. Monitor weight.  4. Nutrition rep will continue to see patient for ongoing meal and snack preferences.     RD will con't to monitor per dept policy             "

## 2018-06-26 NOTE — PROGRESS NOTES
"Pulmonary Critical Care Progress Note      Admit Date: 6/21/2018    Chief Complaint: Perinephric hematoma    History of Present Illness: 28 yof with a PMHx of lupus, HTN and stage 3 CKD in ICU due to large perinephric hematoma on CT following renal biopsy.  Hgb was 6.8 then 6.4. She received 2 units PRBCs in the ER and has remained hemodynamically stable. IR --> no extravasation -->no embolization done.      Interval Events:  24 hour interval history reviewed   -No adverse events overnight.      Past Medical History:   Diagnosis Date   • Anemia    • Hypertension    • IUD (intrauterine device) in place 06/20/2018   • Lupus    • Renal disorder 06/20/2018    \"Stage 3 kidney disease\"   • Ruptured ectopic pregnancy        Past Surgical History:   Procedure Laterality Date   • PELVISCOPY  8/30/08    Performed by JEREMIAH MENJIVAR at SURGERY [facility] ORS   • LYSIS ADHESIONS GYN  8/30/08    Performed by JEREMIAH MENJIVAR at SURGERY [facility] ORS       Allergies   Allergen Reactions   • Nkda [No Known Drug Allergy]        Family History   Problem Relation Age of Onset   • Arthritis Mother        Social History   Substance Use Topics   • Smoking status: Never Smoker   • Smokeless tobacco: Never Used   • Alcohol use No         Review of Systems   Constitutional: Negative for chills and fever.   HENT: Negative for congestion.    Eyes: Negative for blurred vision and double vision.   Respiratory: Negative for sputum production and shortness of breath.    Cardiovascular: Negative for chest pain and palpitations.   Gastrointestinal: Positive for abdominal pain. Negative for blood in stool, melena, nausea and vomiting.   Genitourinary: Negative for dysuria.   Neurological: Negative for focal weakness.   Psychiatric/Behavioral: Negative for depression.   All other systems reviewed and are negative.        Physical Exam   Constitutional: She appears well-developed and well-nourished.   HENT:   Head: Normocephalic and atraumatic. "   Eyes: Conjunctivae are normal. Pupils are equal, round, and reactive to light.   Neck: No tracheal deviation present.   Cardiovascular: Normal rate and regular rhythm.    Pulmonary/Chest: She has no wheezes.   Diminished throughout   Abdominal: Soft. She exhibits no distension and no mass. There is tenderness. There is no rebound and no guarding.   Tenderness in LUQ/flank   Musculoskeletal: She exhibits no edema.   Neurological: No cranial nerve deficit.   Skin: Skin is warm and dry.   Psychiatric: She has a normal mood and affect.   Nursing note and vitals reviewed.  unchanged    PFSH:  No change.    Respiratory:     Pulse Oximetry: 95 %  Chest Tube Drains:          HemoDynamics:  Pulse: 91, Heart Rate (Monitored): 92  NIBP: 134/82         Neuro:  RASS 0, able to ambulate to bathroom with assistance, normal affect, follows commands             Fluids:  Intake/Output       06/24/18 0700 - 06/25/18 0659 06/25/18 0700 - 06/26/18 0659 06/26/18 0700 - 06/27/18 0659      1033-8961 1491-9377 Total 1680-1321 7755-7154 Total 2769-9285 2077-5184 Total       Intake    P.O.  760  400 1160  550  200 750  --  -- --    P.O.  550 200 750 -- -- --    I.V.  110  -- 110  --  -- --  100  -- 100    IV Piggyback Volume (IV Piggyback) 100 -- 100 -- -- -- 100 -- 100    IV Volume (TKO) 10 -- 10 -- -- -- -- -- --    Total Intake  550 200 750 100 -- 100       Output    Urine  420  325 745  250  250 500  425  -- 425    Number of Times Voided -- 2 x 2 x -- 1 x 1 x 2 x -- 2 x    Urine Void (mL) (non-catheter) -- 325 325 250 250 500 425 -- 425    Output (mL) ([REMOVED] Urinary Catheter Indwelling Catheter) 420 -- 420 -- -- -- -- -- --    Emesis  --  -- --  --  -- --  --  -- --    Emesis - Number of Times -- -- -- 1 x -- 1 x -- -- --    Stool  --  -- --  --  -- --  --  -- --    Number of Times Stooled -- 0 x 0 x 0 x 1 x 1 x -- -- --    Total Output 420 120 747 558 039 500 425 -- 425       Net I/O     450 18 525 300 -50  250 -325 -- -325           Recent Labs      183  18   0543  18   0435   SODIUM  139  139  137   POTASSIUM  5.1  4.6  5.0   CHLORIDE  109  110  108   CO2  20  20  21   BUN  69*  70*  81*   CREATININE  3.87*  3.78*  3.49*   MAGNESIUM  2.1  2.3  2.3   PHOSPHORUS  9.4*   --    --    CALCIUM  8.1*  7.9*  8.3*       GI/Nutrition:    Liver Function  Recent Labs      183  18   0543  18   043   GLUCOSE  115*  106*  100*       Heme:  Recent Labs      183  18   0543  18   0435   RBC  2.31*  2.42*  2.46*   HEMOGLOBIN  7.0*  7.1*  7.2*   HEMATOCRIT  21.0*  22.6*  23.2*   PLATELETCT  170  207  240       Infectious Disease:  Temp  Av.1 °C (98.8 °F)  Min: 36.9 °C (98.5 °F)  Max: 37.4 °C (99.3 °F)  Micro: cultures reviewed  Recent Labs      18   0543  18   0435   WBC  10.8  11.9*  11.0*   NEUTSPOLYS  76.00*  75.80*  77.10*   LYMPHOCYTES  15.10*  14.30*  13.40*   MONOCYTES  7.00  7.00  6.30   EOSINOPHILS  0.00  0.20  0.10   BASOPHILS  0.10  0.30  0.20     Current Facility-Administered Medications   Medication Dose Frequency Provider Last Rate Last Dose   • amLODIPine (NORVASC) tablet 7.5 mg  7.5 mg Q DAY Drae Lux M.D.   7.5 mg at 18 0802   • cyanocobalamin (VITAMIN B-12) tablet 1,000 mcg  1,000 mcg DAILY Sheridan Vasquez M.D.   1,000 mcg at 18 0801   • pneumococcal 13-Tessy Conj Vacc (PREVNAR 13) syringe 0.5 mL  0.5 mL Once PRN Andrews Barcenas M.D.       • carvedilol (COREG) tablet 25 mg  25 mg BID WITH MEALS Luci Vale M.D.   25 mg at 18 0758   • isosorbide mononitrate SR (IMDUR) tablet 60 mg  60 mg Q DAY Luci Vale M.D.   60 mg at 18 0804   • hydrALAZINE (APRESOLINE) tablet 10 mg  10 mg Q8HRS Luci Vale M.D.   10 mg at 18 0555   • sevelamer carbonate (RENVELA) tablet 800 mg  800 mg TID WITH MEALS Sheridan Vasquez M.D.   800 mg at 18 1209   • diphenhydrAMINE  (BENADRYL) injection 25 mg  25 mg Once PRN Nishant Roman M.D.       • meperidine (DEMEROL) injection 25 mg  25 mg Once PRN Nishant Roman M.D.       • hydrocortisone sodium succinate PF (SOLU-CORTEF) 100 MG injection 100 mg  100 mg Once PRN Nishant Roman M.D.       • HYDROcodone-acetaminophen (NORCO) 5-325 MG per tablet 1 Tab  1 Tab Q6HRS PRN Myah Frias M.D.   1 Tab at 06/25/18 1730   • hydroxychloroquine (PLAQUENIL) tablet 200 mg  200 mg BID Myah Frias M.D.   200 mg at 06/26/18 0805   • predniSONE (DELTASONE) tablet 60 mg  60 mg DAILY Myah Frias M.D.   60 mg at 06/26/18 0801   • senna-docusate (PERICOLACE or SENOKOT S) 8.6-50 MG per tablet 2 Tab  2 Tab BID Myah Frias M.D.   Stopped at 06/26/18 0900    And   • polyethylene glycol/lytes (MIRALAX) PACKET 1 Packet  1 Packet QDAY PRN Myah Frias M.D.        And   • magnesium hydroxide (MILK OF MAGNESIA) suspension 30 mL  30 mL QDAY PRN Myah Frias M.D.        And   • bisacodyl (DULCOLAX) suppository 10 mg  10 mg QDAY PRN Myah Frias M.D.       • ondansetron (ZOFRAN) syringe/vial injection 4 mg  4 mg Q4HRS PRN Myah Frias M.D.   4 mg at 06/25/18 1356   • ondansetron (ZOFRAN ODT) dispertab 4 mg  4 mg Q4HRS PRN Myah Frias M.D.   Stopped at 06/22/18 0400   • promethazine (PHENERGAN) tablet 12.5-25 mg  12.5-25 mg Q4HRS PRN Myah Frias M.D.       • promethazine (PHENERGAN) suppository 12.5-25 mg  12.5-25 mg Q4HRS PRN Myah Frias M.D.   12.5 mg at 06/23/18 0332   • prochlorperazine (COMPAZINE) injection 5-10 mg  5-10 mg Q4HRS PRN Myah Frias M.D.       • acetaminophen (TYLENOL) tablet 650 mg  650 mg Q6HRS PRN Myah Frias M.D.   650 mg at 06/26/18 0814   • glucose 4 g chewable tablet 16 g  16 g Q15 MIN PRN Sheridan Vasquez M.D.        And   • dextrose 50% (D50W) injection 25 mL  25 mL Q15 MIN PRN Sheridan Vasquez M.D.       • labetalol (NORMODYNE,TRANDATE) injection 10-20 mg  10-20 mg Q4HRS PRN Andrews Barcenas M.D.   20 mg at 06/24/18 2337   •  hydrALAZINE (APRESOLINE) injection 10-20 mg  10-20 mg Q4HRS PRN Andrews Barcenas M.D.   20 mg at 06/24/18 2202     Last reviewed on 6/22/2018  9:30 AM by Valeria Collins    Quality  Measures:  Radiology images reviewed, Labs reviewed and Medications reviewed                    Assessment/Plan:  Post-procedural perinephric hematoma s/P IR Arteriography 6/22 - no ongoing bleed noted.  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

## 2018-06-27 ENCOUNTER — APPOINTMENT (OUTPATIENT)
Dept: RADIOLOGY | Facility: MEDICAL CENTER | Age: 28
DRG: 919 | End: 2018-06-27
Attending: STUDENT IN AN ORGANIZED HEALTH CARE EDUCATION/TRAINING PROGRAM
Payer: COMMERCIAL

## 2018-06-27 PROBLEM — R82.71 ASYMPTOMATIC BACTERIURIA: Status: RESOLVED | Noted: 2018-06-22 | Resolved: 2018-06-27

## 2018-06-27 PROBLEM — M32.14 SYSTEMIC LUPUS ERYTHEMATOSUS WITH GLOMERULAR DISEASE (HCC): Status: ACTIVE | Noted: 2018-06-23

## 2018-06-27 LAB
ANION GAP SERPL CALC-SCNC: 8 MMOL/L (ref 0–11.9)
BASOPHILS # BLD AUTO: 0.2 % (ref 0–1.8)
BASOPHILS # BLD: 0.02 K/UL (ref 0–0.12)
BUN SERPL-MCNC: 83 MG/DL (ref 8–22)
CALCIUM SERPL-MCNC: 8.1 MG/DL (ref 8.5–10.5)
CHLORIDE SERPL-SCNC: 108 MMOL/L (ref 96–112)
CO2 SERPL-SCNC: 20 MMOL/L (ref 20–33)
CREAT SERPL-MCNC: 3.32 MG/DL (ref 0.5–1.4)
CREAT UR-MCNC: 73.7 MG/DL
EOSINOPHIL # BLD AUTO: 0.01 K/UL (ref 0–0.51)
EOSINOPHIL NFR BLD: 0.1 % (ref 0–6.9)
ERYTHROCYTE [DISTWIDTH] IN BLOOD BY AUTOMATED COUNT: 51.4 FL (ref 35.9–50)
ERYTHROCYTE [DISTWIDTH] IN BLOOD BY AUTOMATED COUNT: 51.9 FL (ref 35.9–50)
GLUCOSE SERPL-MCNC: 106 MG/DL (ref 65–99)
HCT VFR BLD AUTO: 21.6 % (ref 37–47)
HCT VFR BLD AUTO: 25 % (ref 37–47)
HGB BLD-MCNC: 7 G/DL (ref 12–16)
HGB BLD-MCNC: 8.1 G/DL (ref 12–16)
IMM GRANULOCYTES # BLD AUTO: 0.35 K/UL (ref 0–0.11)
IMM GRANULOCYTES NFR BLD AUTO: 2.6 % (ref 0–0.9)
LV EJECT FRACT MOD 2C 99903: 53.38
LV EJECT FRACT MOD 4C 99902: 60.25
LV EJECT FRACT MOD BP 99901: 58.45
LYMPHOCYTES # BLD AUTO: 1.67 K/UL (ref 1–4.8)
LYMPHOCYTES NFR BLD: 12.6 % (ref 22–41)
MAGNESIUM SERPL-MCNC: 2.3 MG/DL (ref 1.5–2.5)
MCH RBC QN AUTO: 30.5 PG (ref 27–33)
MCH RBC QN AUTO: 30.6 PG (ref 27–33)
MCHC RBC AUTO-ENTMCNC: 32.4 G/DL (ref 33.6–35)
MCHC RBC AUTO-ENTMCNC: 32.4 G/DL (ref 33.6–35)
MCV RBC AUTO: 94 FL (ref 81.4–97.8)
MCV RBC AUTO: 94.3 FL (ref 81.4–97.8)
MONOCYTES # BLD AUTO: 0.8 K/UL (ref 0–0.85)
MONOCYTES NFR BLD AUTO: 6 % (ref 0–13.4)
NEUTROPHILS # BLD AUTO: 10.45 K/UL (ref 2–7.15)
NEUTROPHILS NFR BLD: 78.5 % (ref 44–72)
NRBC # BLD AUTO: 0 K/UL
NRBC BLD-RTO: 0 /100 WBC
PHOSPHATE SERPL-MCNC: 6.7 MG/DL (ref 2.5–4.5)
PLATELET # BLD AUTO: 223 K/UL (ref 164–446)
PLATELET # BLD AUTO: 252 K/UL (ref 164–446)
PMV BLD AUTO: 10.1 FL (ref 9–12.9)
PMV BLD AUTO: 10.3 FL (ref 9–12.9)
POTASSIUM SERPL-SCNC: 5 MMOL/L (ref 3.6–5.5)
PROT UR-MCNC: 188.7 MG/DL (ref 0–15)
PROT/CREAT UR: 2560 MG/G (ref 10–107)
RBC # BLD AUTO: 2.29 M/UL (ref 4.2–5.4)
RBC # BLD AUTO: 2.66 M/UL (ref 4.2–5.4)
SODIUM SERPL-SCNC: 136 MMOL/L (ref 135–145)
WBC # BLD AUTO: 13.3 K/UL (ref 4.8–10.8)
WBC # BLD AUTO: 15 K/UL (ref 4.8–10.8)

## 2018-06-27 PROCEDURE — 700111 HCHG RX REV CODE 636 W/ 250 OVERRIDE (IP): Performed by: HOSPITALIST

## 2018-06-27 PROCEDURE — 700111 HCHG RX REV CODE 636 W/ 250 OVERRIDE (IP): Performed by: STUDENT IN AN ORGANIZED HEALTH CARE EDUCATION/TRAINING PROGRAM

## 2018-06-27 PROCEDURE — 700102 HCHG RX REV CODE 250 W/ 637 OVERRIDE(OP): Performed by: INTERNAL MEDICINE

## 2018-06-27 PROCEDURE — 84100 ASSAY OF PHOSPHORUS: CPT

## 2018-06-27 PROCEDURE — A9270 NON-COVERED ITEM OR SERVICE: HCPCS | Performed by: STUDENT IN AN ORGANIZED HEALTH CARE EDUCATION/TRAINING PROGRAM

## 2018-06-27 PROCEDURE — 82570 ASSAY OF URINE CREATININE: CPT

## 2018-06-27 PROCEDURE — A9270 NON-COVERED ITEM OR SERVICE: HCPCS | Performed by: INTERNAL MEDICINE

## 2018-06-27 PROCEDURE — 85027 COMPLETE CBC AUTOMATED: CPT

## 2018-06-27 PROCEDURE — 84156 ASSAY OF PROTEIN URINE: CPT

## 2018-06-27 PROCEDURE — 700102 HCHG RX REV CODE 250 W/ 637 OVERRIDE(OP): Performed by: STUDENT IN AN ORGANIZED HEALTH CARE EDUCATION/TRAINING PROGRAM

## 2018-06-27 PROCEDURE — 99233 SBSQ HOSP IP/OBS HIGH 50: CPT | Mod: GC | Performed by: INTERNAL MEDICINE

## 2018-06-27 PROCEDURE — 93306 TTE W/DOPPLER COMPLETE: CPT | Mod: 26 | Performed by: INTERNAL MEDICINE

## 2018-06-27 PROCEDURE — 83735 ASSAY OF MAGNESIUM: CPT

## 2018-06-27 PROCEDURE — 700102 HCHG RX REV CODE 250 W/ 637 OVERRIDE(OP): Performed by: HOSPITALIST

## 2018-06-27 PROCEDURE — 770020 HCHG ROOM/CARE - TELE (206)

## 2018-06-27 PROCEDURE — 93306 TTE W/DOPPLER COMPLETE: CPT

## 2018-06-27 PROCEDURE — A9270 NON-COVERED ITEM OR SERVICE: HCPCS | Performed by: HOSPITALIST

## 2018-06-27 PROCEDURE — 85025 COMPLETE CBC W/AUTO DIFF WBC: CPT

## 2018-06-27 PROCEDURE — 71046 X-RAY EXAM CHEST 2 VIEWS: CPT

## 2018-06-27 PROCEDURE — 80048 BASIC METABOLIC PNL TOTAL CA: CPT

## 2018-06-27 RX ORDER — FUROSEMIDE 10 MG/ML
80 INJECTION INTRAMUSCULAR; INTRAVENOUS
Status: DISCONTINUED | OUTPATIENT
Start: 2018-06-27 | End: 2018-06-29

## 2018-06-27 RX ORDER — METOLAZONE 2.5 MG/1
5 TABLET ORAL
Status: DISCONTINUED | OUTPATIENT
Start: 2018-06-27 | End: 2018-06-29 | Stop reason: HOSPADM

## 2018-06-27 RX ADMIN — LABETALOL HYDROCHLORIDE 20 MG: 5 INJECTION, SOLUTION INTRAVENOUS at 20:01

## 2018-06-27 RX ADMIN — SEVELAMER CARBONATE 800 MG: 800 TABLET, FILM COATED ORAL at 17:27

## 2018-06-27 RX ADMIN — FUROSEMIDE 80 MG: 10 INJECTION, SOLUTION INTRAMUSCULAR; INTRAVENOUS at 17:27

## 2018-06-27 RX ADMIN — ACETAMINOPHEN 650 MG: 325 TABLET, FILM COATED ORAL at 08:32

## 2018-06-27 RX ADMIN — ISOSORBIDE MONONITRATE 60 MG: 30 TABLET ORAL at 05:49

## 2018-06-27 RX ADMIN — CARVEDILOL 25 MG: 25 TABLET, FILM COATED ORAL at 08:32

## 2018-06-27 RX ADMIN — METOLAZONE 5 MG: 2.5 TABLET ORAL at 10:17

## 2018-06-27 RX ADMIN — FUROSEMIDE 80 MG: 10 INJECTION, SOLUTION INTRAMUSCULAR; INTRAVENOUS at 10:17

## 2018-06-27 RX ADMIN — Medication 1000 MCG: at 05:49

## 2018-06-27 RX ADMIN — LOSARTAN POTASSIUM 25 MG: 25 TABLET, FILM COATED ORAL at 05:48

## 2018-06-27 RX ADMIN — HYDROXYCHLOROQUINE SULFATE 200 MG: 200 TABLET, FILM COATED ORAL at 05:48

## 2018-06-27 RX ADMIN — SEVELAMER CARBONATE 800 MG: 800 TABLET, FILM COATED ORAL at 13:25

## 2018-06-27 RX ADMIN — PREDNISONE 60 MG: 20 TABLET ORAL at 05:49

## 2018-06-27 RX ADMIN — HYDROXYCHLOROQUINE SULFATE 200 MG: 200 TABLET, FILM COATED ORAL at 17:28

## 2018-06-27 RX ADMIN — HYDRALAZINE HYDROCHLORIDE 10 MG: 10 TABLET, FILM COATED ORAL at 05:50

## 2018-06-27 ASSESSMENT — PAIN SCALES - GENERAL
PAINLEVEL_OUTOF10: 4
PAINLEVEL_OUTOF10: 2
PAINLEVEL_OUTOF10: 0
PAINLEVEL_OUTOF10: 0

## 2018-06-27 ASSESSMENT — ENCOUNTER SYMPTOMS
HEMOPTYSIS: 0
WHEEZING: 0
WEAKNESS: 1
SHORTNESS OF BREATH: 1
EYES NEGATIVE: 1
ABDOMINAL PAIN: 0
CLAUDICATION: 0
PND: 0
NECK PAIN: 0
VOMITING: 0
SPUTUM PRODUCTION: 0
DIZZINESS: 0
PALPITATIONS: 0
MYALGIAS: 0
ORTHOPNEA: 1
DIARRHEA: 0
PSYCHIATRIC NEGATIVE: 1
HEADACHES: 0
COUGH: 0
NAUSEA: 0
WEAKNESS: 0
PALPITATIONS: 1
SHORTNESS OF BREATH: 0
ORTHOPNEA: 0
BRUISES/BLEEDS EASILY: 0

## 2018-06-27 NOTE — PROGRESS NOTES
Internal Medicine Interval Note  Note Author: Ramírez Knight M.D.     Name Mendy Solano     1990   Age/Sex 28 y.o. female   MRN [id number]   Code Status FULL     After 5PM or if no immediate response to page, please call for cross-coverage  Attending/Team: Dr. Hernandez/Alis See Patient List for primary contact information  Call [phone] to page    1st Call - Day Intern (R1):   Dr. Knight 2nd Call - Day Sr. Resident (R2/R3):   / Adolfo         Reason for interval visit  (Principal Problem)   Retroperitoneal hematoma after left kidney biopsy  Aemia requiring transfusion  AWA on CKD3  Lupus nephritis  Anasarca    Interval Problem Daily Status Update  (24 hours, subjective history, new data)   Hg improved to 8.1  Protein:Cr 2560  Phosphorus 6.7, continuing sevelamer   Repeat echo pending for dilated CM and reduced EF  Starting Lasix and metolazone, keeping cath but no HD now, per Nephrology  CXR to assess mild dyspnea with anasarca, no respiratory distress on room air    Consultants/Specialty  Critical Care  Cardiology  Nephrology    Disposition/Barriers to discharge:   Improving renal function, stabilized hemoglobin.    Quality Measures  Quality-Core Measures   Reviewed items::  EKG reviewed, Radiology images reviewed, Labs reviewed and Medications reviewed  Desai catheter::  No Desai  DVT prophylaxis - mechanical:  SCDs  Ulcer Prophylaxis::  Not indicated          Physical Exam       Vitals:    18 0000 18 0400 18 0809 18 1144   BP: 155/99 134/78 151/97 134/81   Pulse: 82 87 88 90   Resp: 17 16 18 16   Temp: 37.1 °C (98.7 °F) 36.8 °C (98.3 °F) 36.8 °C (98.3 °F) 37.1 °C (98.7 °F)   TempSrc:       SpO2: 95% 97% 95% 96%   Weight:       Height:         Body mass index is 29.5 kg/m². Weight: 80.4 kg (177 lb 4 oz)  Oxygen Therapy:  Pulse Oximetry: 96 %, O2 (LPM): 0, O2 Delivery: None (Room Air)    Physical Exam   Constitutional: She is oriented to person, place, and  time. No distress.   HENT:   Head: Normocephalic and atraumatic.   Eyes: EOM are normal. Pupils are equal, round, and reactive to light.   Neck: Normal range of motion. Neck supple.   Cardiovascular: Normal rate and regular rhythm.  Exam reveals gallop and S4.    Pulmonary/Chest: She is in respiratory distress. She has rales.   Abdominal: Soft. Bowel sounds are normal. She exhibits distension. There is no tenderness. There is no rebound and no guarding.   Musculoskeletal: Normal range of motion. She exhibits edema.   Lymphadenopathy:     She has no cervical adenopathy.   Neurological: She is alert and oriented to person, place, and time.   Skin: Skin is warm and dry. She is not diaphoretic.   Psychiatric: Mood and affect normal.             Assessment/Plan     * Acute on Chronic Anemia due to traumatic retroperitoneal hematoma S/P renal biopsy - (present on admission)   Assessment & Plan    CT ABD: Large left perinephric space and left retroperitoneal hemorrhage and hematoma extending to the left lower quadrant and pelvis (6/21)  No evidence of extravasation, no amenable for IR embolization   S/P 3U pRBC in ED for Hgb 6.8 on admission   HB remains stable  Plan  - Transfuse Hgb <7   - SCDs for DVT prophylaxis   - Consider repeat CT if HGB starts to drop         Systemic lupus erythematosus with glomerular disease (HCC)- (present on admission)   Assessment & Plan    Presented with lupus nephritis and pericardial effusion   Protein:creatinine ratio 2560  Plan:  - Continue hydroxychloroquine, prednisone, rituximab per Nephrology, recs appreciated  - Outpatient rheumatology follow up         Pericardial effusion- (present on admission)   Assessment & Plan    Echo: Large pericardial effusion with no evidence of hemodynamic compromise (6/24)  Hemodynamically stable, no evidence of tamponade   Suspect SLE induced vs uremia vs drug induced   Plan  - Repeat echo today per Cardiology, recs appreciated        Acute renal  failure secondary to lupus nephritis - (present on admission)   Assessment & Plan    S/P Renal biopsy consistent with lupus nephritis (6/21)  Labs 3/14/17 showed Cr 2.08 GFR 31  Cr 3+ currently  Non-oliguric acute on chronic kidney injury   HD catheter placed on 6/22  Stable kidney function since admission, no improvement   Plan  - Avoid nephrotoxic agents   - No current indications for HD but keep cath per Nephrology, recs appreciated        Hypertension   Assessment & Plan    BP goal < 150/90   Plan:  - Continue hydralazine, imdur  - Titrate amlodipine dose   - Labetolol PRN         Renal anasarca- (present on admission)   Assessment & Plan    S/P fluids and PRBCs in the setting of acute kidney injury   8+ kg weight gain since presentation  Plan  - 2L Fluid restriction with Strict I&O, daily weights  - CXR  - starting Lasix and metolazone per Nephrology, recs appreciated        Anemia of chronic disease/autoimmune - (present on admission)   Assessment & Plan    Iron studies consistent with ACD  Plan:  - Monitor CBC        Hyperphosphatemia- (present on admission)   Assessment & Plan    Secondary to renal failure  Goal phos <6  Plan  - Continue Sevelamir

## 2018-06-27 NOTE — PROGRESS NOTES
Shift report received and assessment completed. No  family at bedside. Pt indicates no distress. Call light placed within reach, bed in lowest position, and pt educated to call. Will continue to monitor patient.

## 2018-06-27 NOTE — CONSULTS
Note Author: Rupa Rivas M.D.       Name Mendy Solano     1990   Age/Sex 28 y.o. 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     Interval hx  18 Pt has ongoing abdominal pain, but no hematuria, TEMP HD catheter placed yesterday  18 EPO received yesterday, ongoing abdominla pain, 1st dose of rituximab  18 no acute issues since yesterday, notes that her anasarca is much improved.   18 Episode of vomitingx1 yesterday. No other acute issues. I/O: 100/250  2018 I/O 100/625, increased sob, orthopnea. Will add lasix and metolazone, hold other bp medications for now.     Review of Systems   Constitutional: Negative for malaise/fatigue.   HENT: Negative.    Eyes: Negative.    Respiratory: Positive for shortness of breath.    Cardiovascular: Positive for palpitations and orthopnea.   Gastrointestinal: Negative for abdominal pain, diarrhea, nausea and vomiting.        Improving abd pain   Genitourinary: Negative.  Negative for dysuria and urgency.   Musculoskeletal: Negative for myalgias and neck pain.   Skin: Negative.    Neurological: Positive for weakness. Negative for dizziness and headaches.   Endo/Heme/Allergies: Does not bruise/bleed easily.   Psychiatric/Behavioral: Negative.          Past Medical History:   Past Medical History:   Diagnosis Date   • Anemia    •  "Hypertension    • IUD (intrauterine device) in place 06/20/2018   • Lupus    • Renal disorder 06/20/2018    \"Stage 3 kidney disease\"   • Ruptured ectopic pregnancy        Past Surgical History:  Past Surgical History:   Procedure Laterality Date   • PELVISCOPY  8/30/08    Performed by JEREMIAH MENJIVAR at SURGERY [facility]   • LYSIS ADHESIONS GYN  8/30/08    Performed by JEREMIAH MENJIVAR at SURGERY [facility]S       Current Outpatient Medications:  Home Medications     Reviewed by Valeria Collins (Pharmacy Tech) on 06/22/18 at 0930  Med List Status: Complete   Medication Last Dose Status   candesartan (ATACAND) 4 MG Tab  Active   ferrous sulfate 325 (65 Fe) MG tablet  Active   hydrocodone-acetaminophen (NORCO) 5-325 MG Tab per tablet 6/21/2018 Active   hydroxychloroquine (PLAQUENIL) 200 MG Tab 6/20/2018 Active   losartan (COZAAR) 25 MG Tab  Active   mycophenolate sodium (MYFORTIC) 360 MG Tablet Delayed Response tablet week Active   predniSONE (DELTASONE) 20 MG Tab 6/20/2018 Active   vitamin D, Ergocalciferol, (DRISDOL) 55691 units Cap capsule  Active                Medication Allergy/Sensitivities:  Allergies   Allergen Reactions   • Nkda [No Known Drug Allergy]      Family History:  Family History   Problem Relation Age of Onset   • Arthritis Mother      Social History:  Social History     Social History   • Marital status: Single     Spouse name: N/A   • Number of children: N/A   • Years of education: N/A     Occupational History   • Not on file.     Social History Main Topics   • Smoking status: Never Smoker   • Smokeless tobacco: Never Used   • Alcohol use No   • Drug use: No   • Sexual activity: Not on file     Other Topics Concern   • Not on file     Social History Narrative   • No narrative on file     Living situation: At home  PCP : Pcp Pt States None        Physical Exam     Vitals:    06/26/18 2218 06/27/18 0000 06/27/18 0400 06/27/18 0809   BP: (!) 163/113 155/99 134/78 151/97   Pulse: 90 " "82 87 88   Resp:  17 16 18   Temp:  37.1 °C (98.7 °F) 36.8 °C (98.3 °F) 36.8 °C (98.3 °F)   TempSrc:       SpO2:  95% 97% 95%   Weight:       Height:         Body mass index is 29.5 kg/m².  /97   Pulse 88   Temp 36.8 °C (98.3 °F)   Resp 18   Ht 1.651 m (5' 5\")   Wt 80.4 kg (177 lb 4 oz)   LMP 05/28/2018 Comment: \"IUD in place\"  SpO2 95%   Breastfeeding? No   BMI 29.50 kg/m²   O2 therapy: Pulse Oximetry: 95 %, O2 (LPM): 0, O2 Delivery: None (Room Air)    Physical Exam   Constitutional: She is oriented to person, place, and time. No distress.   HENT:   Head: Normocephalic and atraumatic.   Hemodialysis catheter in place.    Eyes: No scleral icterus.   Neck:   RIght IJ temp cath   Cardiovascular: Normal rate, regular rhythm, normal heart sounds and intact distal pulses.  Exam reveals no gallop and no friction rub.    No murmur heard.  Pulmonary/Chest: Effort normal. No respiratory distress. She has no wheezes. She has rales.   Crackles bilateral upto about T4-T5   Abdominal: Soft. She exhibits no distension. There is tenderness. There is no rebound and no guarding.   Mild tenderness on the LLQ, improved from yesterday        Musculoskeletal: Normal range of motion. She exhibits edema. She exhibits no tenderness or deformity.   Trace edema    Lymphadenopathy:     She has no cervical adenopathy.   Neurological: She is alert and oriented to person, place, and time.   Skin: Skin is warm and dry. She is not diaphoretic.   Nursing note and vitals reviewed.            Data Review       Old Records Request:   Completed  Current Records review/summary: Completed    Lab Data Review:  Recent Results (from the past 24 hour(s))   CBC WITH DIFFERENTIAL    Collection Time: 06/27/18  3:35 AM   Result Value Ref Range    WBC 13.3 (H) 4.8 - 10.8 K/uL    RBC 2.29 (L) 4.20 - 5.40 M/uL    Hemoglobin 7.0 (L) 12.0 - 16.0 g/dL    Hematocrit 21.6 (L) 37.0 - 47.0 %    MCV 94.3 81.4 - 97.8 fL    MCH 30.6 27.0 - 33.0 pg    MCHC 32.4 " (L) 33.6 - 35.0 g/dL    RDW 51.4 (H) 35.9 - 50.0 fL    Platelet Count 223 164 - 446 K/uL    MPV 10.1 9.0 - 12.9 fL    Neutrophils-Polys 78.50 (H) 44.00 - 72.00 %    Lymphocytes 12.60 (L) 22.00 - 41.00 %    Monocytes 6.00 0.00 - 13.40 %    Eosinophils 0.10 0.00 - 6.90 %    Basophils 0.20 0.00 - 1.80 %    Immature Granulocytes 2.60 (H) 0.00 - 0.90 %    Nucleated RBC 0.00 /100 WBC    Neutrophils (Absolute) 10.45 (H) 2.00 - 7.15 K/uL    Lymphs (Absolute) 1.67 1.00 - 4.80 K/uL    Monos (Absolute) 0.80 0.00 - 0.85 K/uL    Eos (Absolute) 0.01 0.00 - 0.51 K/uL    Baso (Absolute) 0.02 0.00 - 0.12 K/uL    Immature Granulocytes (abs) 0.35 (H) 0.00 - 0.11 K/uL    NRBC (Absolute) 0.00 K/uL   BASIC METABOLIC PANEL    Collection Time: 06/27/18  3:35 AM   Result Value Ref Range    Sodium 136 135 - 145 mmol/L    Potassium 5.0 3.6 - 5.5 mmol/L    Chloride 108 96 - 112 mmol/L    Co2 20 20 - 33 mmol/L    Glucose 106 (H) 65 - 99 mg/dL    Bun 83 (HH) 8 - 22 mg/dL    Creatinine 3.32 (H) 0.50 - 1.40 mg/dL    Calcium 8.1 (L) 8.5 - 10.5 mg/dL    Anion Gap 8.0 0.0 - 11.9   MAGNESIUM    Collection Time: 06/27/18  3:35 AM   Result Value Ref Range    Magnesium 2.3 1.5 - 2.5 mg/dL   ESTIMATED GFR    Collection Time: 06/27/18  3:35 AM   Result Value Ref Range    GFR If  20 (A) >60 mL/min/1.73 m 2    GFR If Non  16 (A) >60 mL/min/1.73 m 2       Imaging/Procedures Review:    Independant Imaging Review: Completed  ECHOCARDIOGRAM COMP W/O CONT   Final Result      IR-EMBOLIZATION   Final Result      1.  Left renal angiography showing no evidence of active extravasation, pseudoaneurysm, AV fistula, or other evidence of vascular injury. No coil embolization was indicated.                  INTERPRETING LOCATION: [address] [facility] [address], [place], [address]      IR-CVC NON TUNNELED > AGE 4   Final Result      1. ULTRASOUND AND FLUOROSCOPIC GUIDED PLACEMENT OF A RIGHT INTERNAL JUGULAR 12 [place] TRIALYSIS TRIPLE LUMEN NON-TUNNELED  HEMODIALYSIS CATHETER.      2. THE HEMODIALYSIS CATHETER MAY BE USED IMMEDIATELY AS CLINICALLY INDICATED. FLUSHES PER PROTOCOL.      US-RENAL   Final Result      Redemonstration of large hematoma anterior and lateral to the left kidney.      Moderate pelvic ascites. Trace perihepatic ascites.      CT-ABDOMEN-PELVIS W/O   Final Result         1. Large left perinephric space and left retroperitoneal hemorrhage and hematoma extending to the left lower quadrant and pelvis..      2. Large pericardial effusion. Small right pleural effusion.      3. Diffuse anasarca, abdominal and pelvic ascites.      CRITICAL RESULT READ BACK: Preliminary findings discussed with and critical read back performed by Dr. BIMAL SANTACRUZ in the Emergency Department via telephone on 6/21/2018 10:04 PM      ECHOCARDIOGRAM COMP W/O CONT    (Results Pending)             Assessment:     1)AWA with fluid overload  -Multifactorial likely worsening SLE nephritis (bx confirmed), and received contrast for renal angio, which could have contributed to contrast induced nephropathy as well.   -Creatinine stable and improving  -lasix today 80mg IV bid with metolazone  -Monitor I/O     2)CKD stage III  -HD catheter in place, continue for now.  -secondary to lupus nephritis, Cr 2.08 on 05/22/2018.  - Proteinuria, rising anti-Ds DNA and low complements.  -Low Albumin 2.4    3)Hyperkalemia - resolved    4) Acute on chronic Anemia in the setting of large retroperitoneal hematoma Iatrogenic after left sided renal biopsy.  -s/p 3 units of PRBCS  -s/p IR guided arteriogram requiring no embolization.   -Remains hemodynamically stable.  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     8)  Secondary hyperaparathyroidism  -Intact PTH 68 and Ca 7.9(corrected 8.7), albumin 2.4    9) Hyperphosphatemia  -Phosp 9.4  -on sevelamer    10) Acute on chronic Large Pericardial Effusion without hemodynamic compromise  -cardiology following  -ECHO shows EF of 25%,   -on coreg, imdur, hydralazine, and losartan  -will hold all other bp medications for now    11) Dyslipidemia  - 2/2 to nephrotic syndrome    12) HTN-controlled  -will hold other bp medications for now  -lasix and metolazone.     Quality Measures  Quality-Core Measures   Reviewed items::  Labs reviewed, Medications reviewed and Radiology images reviewed    We will continue to follow the patient with you. Please contact us with further questions or concerns

## 2018-06-27 NOTE — PROGRESS NOTES
[facility] ICU Transfer Note    Admit Date: 6/21/2018    Primary Diagnosis:  - Perinephric hematoma and retroperitoneal hemorrhage s/p Left renal biopsy     ICU Course Summary:  27 yo F with a past medical history significant for lupus (on mycophenolate, hydroxychloroquine), HTN (on ACEi), and CKD III (due to lupus nephritis), was admitted with large perinephric hematoma and retroperitoneal hemorrhage s/p Left renal biopsy (Class IV-S and class V lupus nephritis) on 6/21/2018. She was also found to have, anasarca, acute on chronic kidney injury and large pericardial effusion with evidence of tamponade effect. She was started on rituximab, in addition to prednisone and hydroxychloroquine per nephrology. Echo was consistent with dilated cardiomyopathy with EF of 25% (most likely due to lupus). She was managed conservatively with medical therapy (labetalol, Imdur, amlodipine and ceftriaxone) and her condition improved with need for hemodialysis. Her kidney function did not improved and repeat Echo was ordered to evaluate response to medical therapy.     Important Events in the ICU:  - HD cath: 6/22/2018   - Antibiotics: Ceftriaxone 6/22 - 6/26     Labs and imaging studies to be continued with their indications:  - CBC: Monitor for any active bleeding or infection    - CMP or BMP: Monitor kidney function     Things to follow:  - Echo results   - Nephrology rec's   - Cardiology rec's   - Titrate losartan dose and taper off Imdur

## 2018-06-27 NOTE — PROGRESS NOTES
Bedside report completed with David BEACH. Pt transferred from ICU today. VSS. Reviewed POC and safety with pt. Call light within reach.

## 2018-06-27 NOTE — PROGRESS NOTES
Cardiology Progress Note               Author: Nury Gann Date & Time created: 6/27/2018  11:45 AM     Interval History:  28 years old female present with dyspnea. History of systemic lupus nephritis,  acute blood loss anemia after kidney biopsy and retroperitoneal bleed.     Chief Complaint:  Pericardial effusion and new reduced LV systolic function    6/27:limited ECHO today. In good spirits. Denies any chest pain or sob     Echocardiography Laboratory  6/24/18  No prior study is available for comparison.   The left ventricle is moderately dilated.  Mild concentric left ventricular hypertrophy.  Severely reduced left ventricular systolic function.  Left ventricular ejection fraction is visually estimated to be 25%.  Grade II diastolic dysfunction.  Severely dilated left atrium.  Mild to moderate mitral regurgitation.  Trace tricuspid regurgitation.  Estimated right ventricular systolic pressure  is 38 mmHg.  Normal inferior vena cava size and inspiratory collapse.  Large pericardial effusion with no solid evidence of hemodynamic   compromise.  Tamponade is a clinical diagnosis.    Review of Systems   Constitutional: Positive for malaise/fatigue.   Respiratory: Negative for cough, hemoptysis, sputum production, shortness of breath and wheezing.    Cardiovascular: Positive for leg swelling. Negative for chest pain, palpitations, orthopnea, claudication and PND.   Gastrointestinal: Negative for nausea and vomiting.   Neurological: Negative for dizziness and weakness.       Physical Exam   Constitutional: She is oriented to person, place, and time. She appears well-developed and well-nourished. No distress.   HENT:   Head: Normocephalic.   Neck: Normal range of motion. No JVD present.   Cardiovascular: Normal rate, regular rhythm, normal heart sounds and intact distal pulses.    No murmur heard.  Pulmonary/Chest: Effort normal and breath sounds normal. No respiratory distress. She has no wheezes.   Abdominal: She  exhibits no distension. There is no tenderness.   Musculoskeletal: She exhibits edema (generalized edema ). She exhibits no tenderness.   Neurological: She is alert and oriented to person, place, and time.   Skin: Skin is warm and dry. No rash noted. She is not diaphoretic.   Psychiatric: Her behavior is normal. Judgment normal.   Nursing note and vitals reviewed.      Hemodynamics:  Temp (24hrs), Av.1 °C (98.7 °F), Min:36.8 °C (98.3 °F), Max:37.3 °C (99.2 °F)  Temperature: 37.1 °C (98.7 °F)  Pulse  Av.3  Min: 82  Max: 118Heart Rate (Monitored): 96  Blood Pressure: 134/81, NIBP: 134/82     Respiratory:    Respiration: 16, Pulse Oximetry: 96 %        RUL Breath Sounds: Clear, RML Breath Sounds: Clear, RLL Breath Sounds: Fine Crackles, VANITA Breath Sounds: Clear, LLL Breath Sounds: Fine Crackles  Fluids:     Weight: 80.4 kg (177 lb 4 oz)  GI/Nutrition:  Orders Placed This Encounter   Procedures   • Diet Order Renal     Standing Status:   Standing     Number of Occurrences:   1     Order Specific Question:   Diet:     Answer:   Renal [8]     Order Specific Question:   Consistency/Fluid modifications:     Answer:   2000 ml Fluid Restriction [11]     Lab Results:  Recent Labs      18   0435  18   0335  18   1009   WBC  11.0*  13.3*  15.0*   RBC  2.46*  2.29*  2.66*   HEMOGLOBIN  7.2*  7.0*  8.1*   HEMATOCRIT  23.2*  21.6*  25.0*   MCV  94.3  94.3  94.0   MCH  29.3  30.6  30.5   MCHC  31.0*  32.4*  32.4*   RDW  52.3*  51.4*  51.9*   PLATELETCT  240  223  252   MPV  10.2  10.1  10.3     Recent Labs      18   0543  18   0435  18   0335   SODIUM  139  137  136   POTASSIUM  4.6  5.0  5.0   CHLORIDE  110  108  108   CO2  20  21  20   GLUCOSE  106*  100*  106*   BUN  70*  81*  83*   CREATININE  3.78*  3.49*  3.32*   CALCIUM  7.9*  8.3*  8.1*             Recent Labs      18   0543   TROPONINI  0.02             Medical Decision Making, by Problem:  Active Hospital Problems     Diagnosis   • *Acute on Chronic Anemia due to traumatic retroperitoneal hematoma S/P renal biopsy  [S36.202A]   • Systemic lupus erythematosus with glomerular disease (HCC) [M32.14]   • Acute renal failure secondary to lupus nephritis  [N17.9]   • Pericardial effusion [I31.3]   • Hypertension [I10]   • Renal anasarca [N04.9]   • Asymptomatic bacteriuria [R82.71]   • Hyperphosphatemia [E83.39]   • Anemia of chronic disease/autoimmune  [D64.9]       Plan:  1. Pericardial effusion:  - ECHO 6/24: large pleural effusion  - repeat ECHO today and pending     2.Cardiomyopathy, assume nonischemic:  - ECHO: ef 25%.  - continue imdur and hydralazine (if ok with lupus)    3. Severe anemia:  - H/H: 8.1/25  - primary following         Quality-Core Measures

## 2018-06-27 NOTE — PROGRESS NOTES
H/H 7.0 and 21.6 at 0335 this morning. Dr. Zapata notified. Pt has no active bleeding and VSS. Orders to recheck CBC at 0900.

## 2018-06-27 NOTE — PROGRESS NOTES
Patient received from RICU nurse with transport.  Patient A&Ox4 on RA, complains of mild HA pain not requiring any intervention. Patient Ambulates readily without assistance

## 2018-06-27 NOTE — CARE PLAN
Problem: Pain Management  Goal: Pain level will decrease to patient's comfort goal  Outcome: PROGRESSING AS EXPECTED  Pt reports pain resolved with prn pain medications.    Problem: Mobility  Goal: Risk for activity intolerance will decrease  Outcome: PROGRESSING AS EXPECTED  Pt up independently in room. Steady gait.

## 2018-06-27 NOTE — CARE PLAN
Problem: Knowledge Deficit  Goal: Knowledge of disease process/condition, treatment plan, diagnostic tests, and medications will improve  Pt and family educated on POC, all questions answered in regards to disease process, treatment and diet. Pt and family verbalize understanding and voice no further questions at this time.    Problem: Pain Management  Goal: Pain level will decrease to patient's comfort goal  Pt receiving prn pain medication as ordered per MD. Pt also using distraction to decrease pain.

## 2018-06-28 LAB
ALBUMIN SERPL BCP-MCNC: 2 G/DL (ref 3.2–4.9)
BASOPHILS # BLD AUTO: 0.1 % (ref 0–1.8)
BASOPHILS # BLD: 0.02 K/UL (ref 0–0.12)
BUN SERPL-MCNC: 83 MG/DL (ref 8–22)
CALCIUM SERPL-MCNC: 8.2 MG/DL (ref 8.5–10.5)
CHLORIDE SERPL-SCNC: 108 MMOL/L (ref 96–112)
CO2 SERPL-SCNC: 21 MMOL/L (ref 20–33)
CREAT SERPL-MCNC: 2.86 MG/DL (ref 0.5–1.4)
EOSINOPHIL # BLD AUTO: 0.02 K/UL (ref 0–0.51)
EOSINOPHIL NFR BLD: 0.1 % (ref 0–6.9)
ERYTHROCYTE [DISTWIDTH] IN BLOOD BY AUTOMATED COUNT: 51.1 FL (ref 35.9–50)
GLUCOSE SERPL-MCNC: 109 MG/DL (ref 65–99)
HCT VFR BLD AUTO: 23.3 % (ref 37–47)
HGB BLD-MCNC: 7.6 G/DL (ref 12–16)
IMM GRANULOCYTES # BLD AUTO: 0.45 K/UL (ref 0–0.11)
IMM GRANULOCYTES NFR BLD AUTO: 3.2 % (ref 0–0.9)
LYMPHOCYTES # BLD AUTO: 2 K/UL (ref 1–4.8)
LYMPHOCYTES NFR BLD: 14.3 % (ref 22–41)
MAGNESIUM SERPL-MCNC: 2.1 MG/DL (ref 1.5–2.5)
MCH RBC QN AUTO: 30.6 PG (ref 27–33)
MCHC RBC AUTO-ENTMCNC: 32.6 G/DL (ref 33.6–35)
MCV RBC AUTO: 94 FL (ref 81.4–97.8)
MONOCYTES # BLD AUTO: 1.18 K/UL (ref 0–0.85)
MONOCYTES NFR BLD AUTO: 8.4 % (ref 0–13.4)
NEUTROPHILS # BLD AUTO: 10.36 K/UL (ref 2–7.15)
NEUTROPHILS NFR BLD: 73.9 % (ref 44–72)
NRBC # BLD AUTO: 0.02 K/UL
NRBC BLD-RTO: 0.1 /100 WBC
PHOSPHATE SERPL-MCNC: 5.9 MG/DL (ref 2.5–4.5)
PLATELET # BLD AUTO: 252 K/UL (ref 164–446)
PMV BLD AUTO: 10 FL (ref 9–12.9)
POTASSIUM SERPL-SCNC: 4.6 MMOL/L (ref 3.6–5.5)
RBC # BLD AUTO: 2.48 M/UL (ref 4.2–5.4)
SODIUM SERPL-SCNC: 136 MMOL/L (ref 135–145)
WBC # BLD AUTO: 14 K/UL (ref 4.8–10.8)

## 2018-06-28 PROCEDURE — 36415 COLL VENOUS BLD VENIPUNCTURE: CPT

## 2018-06-28 PROCEDURE — 700102 HCHG RX REV CODE 250 W/ 637 OVERRIDE(OP): Performed by: STUDENT IN AN ORGANIZED HEALTH CARE EDUCATION/TRAINING PROGRAM

## 2018-06-28 PROCEDURE — A9270 NON-COVERED ITEM OR SERVICE: HCPCS | Performed by: HOSPITALIST

## 2018-06-28 PROCEDURE — 770020 HCHG ROOM/CARE - TELE (206)

## 2018-06-28 PROCEDURE — A9270 NON-COVERED ITEM OR SERVICE: HCPCS | Performed by: STUDENT IN AN ORGANIZED HEALTH CARE EDUCATION/TRAINING PROGRAM

## 2018-06-28 PROCEDURE — 99232 SBSQ HOSP IP/OBS MODERATE 35: CPT | Mod: GC | Performed by: INTERNAL MEDICINE

## 2018-06-28 PROCEDURE — 700111 HCHG RX REV CODE 636 W/ 250 OVERRIDE (IP): Performed by: HOSPITALIST

## 2018-06-28 PROCEDURE — 83735 ASSAY OF MAGNESIUM: CPT

## 2018-06-28 PROCEDURE — 85025 COMPLETE CBC W/AUTO DIFF WBC: CPT

## 2018-06-28 PROCEDURE — 700111 HCHG RX REV CODE 636 W/ 250 OVERRIDE (IP): Performed by: STUDENT IN AN ORGANIZED HEALTH CARE EDUCATION/TRAINING PROGRAM

## 2018-06-28 PROCEDURE — A9270 NON-COVERED ITEM OR SERVICE: HCPCS | Performed by: INTERNAL MEDICINE

## 2018-06-28 PROCEDURE — 80069 RENAL FUNCTION PANEL: CPT

## 2018-06-28 PROCEDURE — 700102 HCHG RX REV CODE 250 W/ 637 OVERRIDE(OP): Performed by: INTERNAL MEDICINE

## 2018-06-28 PROCEDURE — 700102 HCHG RX REV CODE 250 W/ 637 OVERRIDE(OP): Performed by: HOSPITALIST

## 2018-06-28 RX ADMIN — ISOSORBIDE MONONITRATE 60 MG: 30 TABLET ORAL at 05:34

## 2018-06-28 RX ADMIN — LABETALOL HYDROCHLORIDE 20 MG: 5 INJECTION, SOLUTION INTRAVENOUS at 00:12

## 2018-06-28 RX ADMIN — SEVELAMER CARBONATE 800 MG: 800 TABLET, FILM COATED ORAL at 09:28

## 2018-06-28 RX ADMIN — HYDROXYCHLOROQUINE SULFATE 200 MG: 200 TABLET, FILM COATED ORAL at 05:34

## 2018-06-28 RX ADMIN — HYDROXYCHLOROQUINE SULFATE 200 MG: 200 TABLET, FILM COATED ORAL at 17:25

## 2018-06-28 RX ADMIN — ACETAMINOPHEN 650 MG: 325 TABLET, FILM COATED ORAL at 17:25

## 2018-06-28 RX ADMIN — SEVELAMER CARBONATE 800 MG: 800 TABLET, FILM COATED ORAL at 12:43

## 2018-06-28 RX ADMIN — Medication 1000 MCG: at 05:34

## 2018-06-28 RX ADMIN — SEVELAMER CARBONATE 800 MG: 800 TABLET, FILM COATED ORAL at 18:51

## 2018-06-28 RX ADMIN — FUROSEMIDE 80 MG: 10 INJECTION, SOLUTION INTRAMUSCULAR; INTRAVENOUS at 17:25

## 2018-06-28 RX ADMIN — METOLAZONE 5 MG: 2.5 TABLET ORAL at 05:34

## 2018-06-28 RX ADMIN — HYDROCODONE BITARTRATE AND ACETAMINOPHEN 1 TABLET: 5; 325 TABLET ORAL at 11:00

## 2018-06-28 RX ADMIN — FUROSEMIDE 80 MG: 10 INJECTION, SOLUTION INTRAMUSCULAR; INTRAVENOUS at 05:34

## 2018-06-28 RX ADMIN — PREDNISONE 60 MG: 20 TABLET ORAL at 05:34

## 2018-06-28 ASSESSMENT — ENCOUNTER SYMPTOMS
DIZZINESS: 0
NECK PAIN: 0
NAUSEA: 0
PALPITATIONS: 0
ABDOMINAL PAIN: 0
WEAKNESS: 0
PSYCHIATRIC NEGATIVE: 1
ORTHOPNEA: 0
CLAUDICATION: 0
DIARRHEA: 0
PND: 0
WEAKNESS: 1
WHEEZING: 0
HEMOPTYSIS: 0
BRUISES/BLEEDS EASILY: 0
MYALGIAS: 0
VOMITING: 0
SPUTUM PRODUCTION: 0
SHORTNESS OF BREATH: 0
HEADACHES: 0
COUGH: 0
EYES NEGATIVE: 1

## 2018-06-28 ASSESSMENT — PAIN SCALES - GENERAL
PAINLEVEL_OUTOF10: 0
PAINLEVEL_OUTOF10: 1
PAINLEVEL_OUTOF10: 2
PAINLEVEL_OUTOF10: 0

## 2018-06-28 ASSESSMENT — COGNITIVE AND FUNCTIONAL STATUS - GENERAL
SUGGESTED CMS G CODE MODIFIER DAILY ACTIVITY: CH
DAILY ACTIVITIY SCORE: 24
MOBILITY SCORE: 24
SUGGESTED CMS G CODE MODIFIER MOBILITY: CH

## 2018-06-28 NOTE — PROGRESS NOTES
Report received. Assumed pt care. Pt a&ox4. Declines any needs at this time. Bed locked and in lowest position. Call light within reach. Hourly rounding in place.

## 2018-06-28 NOTE — CARE PLAN
Problem: Knowledge Deficit  Goal: Knowledge of disease process/condition, treatment plan, diagnostic tests, and medications will improve  Pt educated and updated regarding plan of care. Encouraged pt to ask questions. Will continue to educate and answer questions as needed.     Problem: Fluid Volume:  Goal: Will maintain balanced intake and output  Educated pt regarding her diet and fluid restriction. Pt confirms understanding.

## 2018-06-28 NOTE — PROGRESS NOTES
Internal Medicine Interval Note  Note Author: Ramírez Knight M.D.     Name Mendy Solano     1990   Age/Sex 28 y.o. female   MRN [id number]   Code Status FULL     After 5PM or if no immediate response to page, please call for cross-coverage  Attending/Team: Dr. Hernandez/Alis See Patient List for primary contact information  Call [phone] to page    1st Call - Day Intern (R1):   Dr. Knight 2nd Call - Day Sr. Resident (R2/R3):   / Adolfo         Reason for interval visit  (Principal Problem)   Retroperitoneal hematoma after left kidney biopsy  Aemia requiring transfusion  AWA on CKD3  Lupus nephritis  Anasarca    Interval Problem Daily Status Update  (24 hours, subjective history, new data)   Clinically improved, improved bibasilar crackles, ?I/Os accurate (zero net over 24 hours)  No intervention for large pericardial effusion, just repeat echo outpatient, Cardiology signed off  Continuing Lasix IV and metolazone per Nephrology    Consultants/Specialty  Critical Care  Cardiology  Nephrology    Disposition/Barriers to discharge:   Further diuresis per Nephrology.    Quality Measures  Quality-Core Measures   Reviewed items::  EKG reviewed, Radiology images reviewed, Labs reviewed and Medications reviewed  Desai catheter::  No Desai  DVT prophylaxis - mechanical:  SCDs  Ulcer Prophylaxis::  Not indicated          Physical Exam       Vitals:    18 0110 18 0400 18 0800 18 1200   BP: 128/80 142/84 146/96 153/96   Pulse: 86 85 88 91   Resp:  17 20 20   Temp:  36.7 °C (98.1 °F) 36.7 °C (98.1 °F) 36.8 °C (98.3 °F)   TempSrc:       SpO2:  98% 97% 97%   Weight:       Height:         Body mass index is 30.05 kg/m². Weight: 81.9 kg (180 lb 8.9 oz)  Oxygen Therapy:  Pulse Oximetry: 97 %, O2 (LPM): 0, O2 Delivery: None (Room Air)    Physical Exam   Constitutional: She is oriented to person, place, and time. No distress.   HENT:   Head: Normocephalic and atraumatic.   Eyes: EOM  are normal. Pupils are equal, round, and reactive to light.   Neck: Normal range of motion. Neck supple.   Cardiovascular: Normal rate and regular rhythm.    Pulmonary/Chest: No respiratory distress. She has no rales.   Abdominal: Soft. Bowel sounds are normal. She exhibits distension. There is no tenderness. There is no rebound and no guarding.   Musculoskeletal: Normal range of motion. She exhibits edema.   Lymphadenopathy:     She has no cervical adenopathy.   Neurological: She is alert and oriented to person, place, and time.   Skin: Skin is warm and dry. She is not diaphoretic.   Psychiatric: Mood and affect normal.             Assessment/Plan     * Acute on Chronic Anemia due to traumatic retroperitoneal hematoma S/P renal biopsy - (present on admission)   Assessment & Plan    CT ABD: Large left perinephric space and left retroperitoneal hemorrhage and hematoma extending to the left lower quadrant and pelvis (6/21)  No evidence of extravasation, no amenable for IR embolization   S/P 3U pRBC in ED for Hgb 6.8 on admission   HB remains stable  Plan  - Transfuse Hgb <7   - SCDs for DVT prophylaxis   - Consider repeat CT if HGB starts to drop         Systemic lupus erythematosus with glomerular disease (HCC)- (present on admission)   Assessment & Plan    Presented with lupus nephritis and pericardial effusion   Protein:creatinine ratio 2560  Plan:  - Continue hydroxychloroquine, prednisone, rituximab per Nephrology, recs appreciated  - Outpatient rheumatology follow up         Pericardial effusion- (present on admission)   Assessment & Plan    Repeat echo: worse large pericardial effusion with some evidence of hemodynamic compromise  Hemodynamically stable, no intervention needed per Cardiology  Suspect SLE induced vs uremia vs drug induced   Plan  - Repeat echo outpatient per Cardiology, recs appreciated        Acute renal failure secondary to lupus nephritis - (present on admission)   Assessment & Plan    S/P  Renal biopsy consistent with lupus nephritis (6/21)  Labs 3/14/17 showed Cr 2.08 GFR 31  Cr 3+ currently  Non-oliguric acute on chronic kidney injury   HD catheter placed on 6/22  Stable kidney function since admission, no improvement   Plan  - Avoid nephrotoxic agents   - No current indications for HD but keep cath per Nephrology, recs appreciated        Hypertension   Assessment & Plan    BP goal < 150/90   Plan:  - Continue hydralazine, imdur  - Titrate amlodipine dose   - Labetolol PRN         Renal anasarca- (present on admission)   Assessment & Plan    S/P fluids and PRBCs in the setting of acute kidney injury   8+ kg weight gain since presentation  Plan  - 2L Fluid restriction with Strict I&O, daily weights  - continue Lasix 80 mg IV BID and metolazone per Nephrology, recs appreciated        Anemia of chronic disease/autoimmune - (present on admission)   Assessment & Plan    Iron studies consistent with ACD  Plan:  - Monitor CBC        Hyperphosphatemia- (present on admission)   Assessment & Plan    Secondary to renal failure  Goal phos <6  Plan  - Continue Sevelamir

## 2018-06-28 NOTE — CONSULTS
Note Author: Rupa Rivas M.D.       Name Mendy Solano     1990   Age/Sex 28 y.o. 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     Interval hx  18 Pt has ongoing abdominal pain, but no hematuria, TEMP HD catheter placed yesterday  18 EPO received yesterday, ongoing abdominla pain, 1st dose of rituximab  18 no acute issues since yesterday, notes that her anasarca is much improved.   18 Episode of vomitingx1 yesterday. No other acute issues. I/O: 100/250  2018 I/O 100/625, increased sob, orthopnea. Will add lasix and metolazone, hold other bp medications for now.   2018 I//1300, improvement in her overall symptoms of sob and orthopnea. Good diuresis with iv lasix. No intervention for the pericardial effusion as per cardiology.   2018 I/O 1000/1600, 650 net negative. SOB improved, patient feeling much better.     Review of Systems   Constitutional: Negative for malaise/fatigue.   HENT: Negative.    Eyes: Negative.    Respiratory: Negative for shortness of breath.    Cardiovascular: Negative for palpitations and orthopnea.   Gastrointestinal: Negative for abdominal pain, diarrhea, nausea and vomiting.        Improving abd pain   Genitourinary: Negative.  Negative for dysuria and urgency.   Musculoskeletal: Negative for myalgias and neck pain.   Skin: Negative.   "  Neurological: Negative for dizziness, weakness and headaches.   Endo/Heme/Allergies: Does not bruise/bleed easily.   Psychiatric/Behavioral: Negative.          Past Medical History:   Past Medical History:   Diagnosis Date   • Anemia    • Hypertension    • IUD (intrauterine device) in place 06/20/2018   • Lupus    • Renal disorder 06/20/2018    \"Stage 3 kidney disease\"   • Ruptured ectopic pregnancy        Past Surgical History:  Past Surgical History:   Procedure Laterality Date   • PELVISCOPY  8/30/08    Performed by JEREMIHA MENJIVAR at SURGERY [facility] ORS   • LYSIS ADHESIONS GYN  8/30/08    Performed by JEREMIAH MENJIVAR at SURGERY [facility] ORS       Current Outpatient Medications:  Home Medications     Reviewed by Valeria Collins (Pharmacy Tech) on 06/22/18 at 0930  Med List Status: Complete   Medication Last Dose Status   candesartan (ATACAND) 4 MG Tab  Active   ferrous sulfate 325 (65 Fe) MG tablet  Active   hydrocodone-acetaminophen (NORCO) 5-325 MG Tab per tablet 6/21/2018 Active   hydroxychloroquine (PLAQUENIL) 200 MG Tab 6/20/2018 Active   losartan (COZAAR) 25 MG Tab  Active   mycophenolate sodium (MYFORTIC) 360 MG Tablet Delayed Response tablet week Active   predniSONE (DELTASONE) 20 MG Tab 6/20/2018 Active   vitamin D, Ergocalciferol, (DRISDOL) 42926 units Cap capsule  Active                Medication Allergy/Sensitivities:  Allergies   Allergen Reactions   • Nkda [No Known Drug Allergy]      Family History:  Family History   Problem Relation Age of Onset   • Arthritis Mother      Social History:  Social History     Social History   • Marital status: Single     Spouse name: N/A   • Number of children: N/A   • Years of education: N/A     Occupational History   • Not on file.     Social History Main Topics   • Smoking status: Never Smoker   • Smokeless tobacco: Never Used   • Alcohol use No   • Drug use: No   • Sexual activity: Not on file     Other Topics Concern   • Not on file     Social " "History Narrative   • No narrative on file     Living situation: At home  PCP : Pcp Pt States None        Physical Exam     Vitals:    06/28/18 0000 06/28/18 0110 06/28/18 0400 06/28/18 0800   BP: 159/100 128/80 142/84 146/96   Pulse: 92 86 85 88   Resp: 17  17 20   Temp: 37 °C (98.6 °F)  36.7 °C (98.1 °F) 36.7 °C (98.1 °F)   TempSrc:       SpO2: 95%  98% 97%   Weight:       Height:         Body mass index is 30.05 kg/m².  /96   Pulse 88   Temp 36.7 °C (98.1 °F)   Resp 20   Ht 1.651 m (5' 5\")   Wt 81.9 kg (180 lb 8.9 oz)   SpO2 97%   Breastfeeding? No   BMI 30.05 kg/m²   O2 therapy: Pulse Oximetry: 97 %, O2 (LPM): 0, O2 Delivery: None (Room Air)    Physical Exam   Constitutional: She is oriented to person, place, and time. No distress.   HENT:   Head: Normocephalic and atraumatic.   Hemodialysis catheter in place.    Eyes: No scleral icterus.   Neck:   RIght IJ temp cath   Cardiovascular: Normal rate, regular rhythm, normal heart sounds and intact distal pulses.  Exam reveals no gallop and no friction rub.    No murmur heard.  Pulmonary/Chest: Effort normal. No respiratory distress. She has no wheezes. She has no rales.   Bibasilar crackles minimal, improved   Abdominal: Soft. She exhibits no distension. There is no tenderness. There is no rebound and no guarding.          Musculoskeletal: Normal range of motion. She exhibits no edema, tenderness or deformity.   Lymphadenopathy:     She has no cervical adenopathy.   Neurological: She is alert and oriented to person, place, and time.   Skin: Skin is warm and dry. She is not diaphoretic.   Nursing note and vitals reviewed.        Data Review       Old Records Request:   Completed  Current Records review/summary: Completed    Lab Data Review:  Recent Results (from the past 24 hour(s))   CBC WITHOUT DIFFERENTIAL    Collection Time: 06/27/18 10:09 AM   Result Value Ref Range    WBC 15.0 (H) 4.8 - 10.8 K/uL    RBC 2.66 (L) 4.20 - 5.40 M/uL    Hemoglobin 8.1 " (L) 12.0 - 16.0 g/dL    Hematocrit 25.0 (L) 37.0 - 47.0 %    MCV 94.0 81.4 - 97.8 fL    MCH 30.5 27.0 - 33.0 pg    MCHC 32.4 (L) 33.6 - 35.0 g/dL    RDW 51.9 (H) 35.9 - 50.0 fL    Platelet Count 252 164 - 446 K/uL    MPV 10.3 9.0 - 12.9 fL   ECHOCARDIOGRAM COMP W/O CONT    Collection Time: 06/27/18 10:27 AM   Result Value Ref Range    Eject.Frac. MOD BP 58.45     Eject.Frac. MOD 4C 60.25     Eject.Frac. MOD 2C 53.38    PROTEIN/CREAT RATIO URINE    Collection Time: 06/27/18 11:15 AM   Result Value Ref Range    Total Protein, Urine 188.7 (H) 0.0 - 15.0 mg/dL    Creatinine, Random Urine 73.70 mg/dL    Protein Creatinine Ratio 2560 (H) 10 - 107 mg/g   CBC WITH DIFFERENTIAL    Collection Time: 06/28/18  5:00 AM   Result Value Ref Range    WBC 14.0 (H) 4.8 - 10.8 K/uL    RBC 2.48 (L) 4.20 - 5.40 M/uL    Hemoglobin 7.6 (L) 12.0 - 16.0 g/dL    Hematocrit 23.3 (L) 37.0 - 47.0 %    MCV 94.0 81.4 - 97.8 fL    MCH 30.6 27.0 - 33.0 pg    MCHC 32.6 (L) 33.6 - 35.0 g/dL    RDW 51.1 (H) 35.9 - 50.0 fL    Platelet Count 252 164 - 446 K/uL    MPV 10.0 9.0 - 12.9 fL    Neutrophils-Polys 73.90 (H) 44.00 - 72.00 %    Lymphocytes 14.30 (L) 22.00 - 41.00 %    Monocytes 8.40 0.00 - 13.40 %    Eosinophils 0.10 0.00 - 6.90 %    Basophils 0.10 0.00 - 1.80 %    Immature Granulocytes 3.20 (H) 0.00 - 0.90 %    Nucleated RBC 0.10 /100 WBC    Neutrophils (Absolute) 10.36 (H) 2.00 - 7.15 K/uL    Lymphs (Absolute) 2.00 1.00 - 4.80 K/uL    Monos (Absolute) 1.18 (H) 0.00 - 0.85 K/uL    Eos (Absolute) 0.02 0.00 - 0.51 K/uL    Baso (Absolute) 0.02 0.00 - 0.12 K/uL    Immature Granulocytes (abs) 0.45 (H) 0.00 - 0.11 K/uL    NRBC (Absolute) 0.02 K/uL   RENAL FUNCTION PANEL    Collection Time: 06/28/18  5:00 AM   Result Value Ref Range    Sodium 136 135 - 145 mmol/L    Potassium 4.6 3.6 - 5.5 mmol/L    Chloride 108 96 - 112 mmol/L    Co2 21 20 - 33 mmol/L    Glucose 109 (H) 65 - 99 mg/dL    Creatinine 2.86 (H) 0.50 - 1.40 mg/dL    Bun 83 (HH) 8 - 22 mg/dL     Calcium 8.2 (L) 8.5 - 10.5 mg/dL    Phosphorus 5.9 (H) 2.5 - 4.5 mg/dL    Albumin 2.0 (L) 3.2 - 4.9 g/dL   MAGNESIUM    Collection Time: 06/28/18  5:00 AM   Result Value Ref Range    Magnesium 2.1 1.5 - 2.5 mg/dL   ESTIMATED GFR    Collection Time: 06/28/18  5:00 AM   Result Value Ref Range    GFR If  24 (A) >60 mL/min/1.73 m 2    GFR If Non  19 (A) >60 mL/min/1.73 m 2       Imaging/Procedures Review:    Independant Imaging Review: Completed  DX-CHEST-2 VIEWS   Final Result         1.  Left lung base atelectasis or infiltrate with small effusion.   2.  Cardiomegaly, new since prior study. Compatible with pericardial effusion diagnosed June 21, 2018 by CT abdomen.      ECHOCARDIOGRAM COMP W/O CONT   Final Result      ECHOCARDIOGRAM COMP W/O CONT   Final Result      IR-EMBOLIZATION   Final Result      1.  Left renal angiography showing no evidence of active extravasation, pseudoaneurysm, AV fistula, or other evidence of vascular injury. No coil embolization was indicated.                  INTERPRETING LOCATION: [address] [place] [address], [id number]      IR-CVC NON TUNNELED > AGE 4   Final Result      1. ULTRASOUND AND FLUOROSCOPIC GUIDED PLACEMENT OF A RIGHT INTERNAL JUGULAR 12 [place] TRIALYSIS TRIPLE LUMEN NON-TUNNELED HEMODIALYSIS CATHETER.      2. THE HEMODIALYSIS CATHETER MAY BE USED IMMEDIATELY AS CLINICALLY INDICATED. FLUSHES PER PROTOCOL.      US-RENAL   Final Result      Redemonstration of large hematoma anterior and lateral to the left kidney.      Moderate pelvic ascites. Trace perihepatic ascites.      CT-ABDOMEN-PELVIS W/O   Final Result         1. Large left perinephric space and left retroperitoneal hemorrhage and hematoma extending to the left lower quadrant and pelvis..      2. Large pericardial effusion. Small right pleural effusion.      3. Diffuse anasarca, abdominal and pelvic ascites.      CRITICAL RESULT READ BACK: Preliminary findings discussed with and critical read  back performed by Dr. BIMAL SANTACRUZ in the Emergency Department via telephone on 6/21/2018 10:04 PM                Assessment:     1)AWA with fluid overload  -Multifactorial likely worsening SLE nephritis (bx confirmed), and received contrast for renal angio, which could have contributed to contrast induced nephropathy as well.  Suspect some element of cardiorenal as well.  -Creatinine much improved 2.8-->2.5(previously 3.32)  -lasix initiated at 80 mg IV bid with metolazone on 6/27  -will transition to oral lasix and metolazone. Ok with discharge with close follow up with Dr. Otto.   -Counseled patient to monitor weight     2)CKD stage III  -HD catheter in place, remove before discharge  -secondary to lupus nephritis, Cr 2.08 on 05/22/2018.  -Proteinuria, rising anti-Ds DNA and low complements.  -Low Albumin 2.0    3)Hyperkalemia - resolved    4) Acute on chronic Anemia in the setting of large retroperitoneal hematoma Iatrogenic after left sided renal biopsy.  -s/p 3 units of PRBCS  -s/p IR guided arteriogram requiring no embolization.   -Remains hemodynamically stable.  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     8) Secondary hyperaparathyroidism  -Intact PTH 68 and Ca 7.9(corrected 8.7), albumin 2.4  -no protein restriction.     9) Hyperphosphatemia  -Phosp 9.4  -on sevelamer    10) Acute on chronic Large Pericardial Effusion without hemodynamic compromise  -cardiology following  -ECHO shows EF of 25%-->35%   -on coreg, imdur, hydralazine, and losartan  -will hold all other bp medications for now, restart when able to    11) Dyslipidemia  - 2/2 to nephrotic syndrome    12) HTN-controlled  -will hold other bp medications  for now  -lasix and metolazone.     Quality Measures  Quality-Core Measures   Reviewed items::  Labs reviewed, Medications reviewed and Radiology images reviewed    Please contact us with further questions or concerns

## 2018-06-28 NOTE — PROGRESS NOTES
Cardiology Progress Note               Author: Nury Gann Date & Time created: 6/28/2018  9:42 AM     Interval History:  28 years old female present with dyspnea. History of systemic lupus nephritis,  acute blood loss anemia after kidney biopsy and retroperitoneal bleed.     Chief Complaint:  Pericardial effusion and new reduced LV systolic function    6/28: denies any chest pain or SOB. Sitting up in bed comfortably and talking in full sentence.     6/27:limited ECHO today. In good spirits. Denies any chest pain or sob     Echocardiography Laboratory  6/24/18  No prior study is available for comparison.   The left ventricle is moderately dilated.  Mild concentric left ventricular hypertrophy.  Severely reduced left ventricular systolic function.  Left ventricular ejection fraction is visually estimated to be 25%.  Grade II diastolic dysfunction.  Severely dilated left atrium.  Mild to moderate mitral regurgitation.  Trace tricuspid regurgitation.  Estimated right ventricular systolic pressure  is 38 mmHg.  Normal inferior vena cava size and inspiratory collapse.  Large pericardial effusion with no solid evidence of hemodynamic   compromise.  Tamponade is a clinical diagnosis.    Review of Systems   Constitutional: Negative for malaise/fatigue.   Respiratory: Negative for cough, hemoptysis, sputum production, shortness of breath and wheezing.    Cardiovascular: Positive for leg swelling. Negative for chest pain, palpitations, orthopnea, claudication and PND.   Gastrointestinal: Negative for nausea and vomiting.   Neurological: Negative for dizziness and weakness.       Physical Exam   Constitutional: She is oriented to person, place, and time. She appears well-developed and well-nourished. No distress.   HENT:   Head: Normocephalic.   Neck: Normal range of motion. No JVD present.   Cardiovascular: Normal rate, regular rhythm, normal heart sounds and intact distal pulses.    No murmur heard.  Pulmonary/Chest:  Effort normal and breath sounds normal. No respiratory distress. She has no wheezes.   Abdominal: She exhibits no distension. There is no tenderness.   Musculoskeletal: She exhibits edema (generalized edema ). She exhibits no tenderness.   Neurological: She is alert and oriented to person, place, and time.   Skin: Skin is warm and dry. No rash noted. She is not diaphoretic.   Psychiatric: Her behavior is normal. Judgment normal.   Nursing note and vitals reviewed.    No changes to previous assessment, with exception of changes     Hemodynamics:  Temp (24hrs), Av.9 °C (98.4 °F), Min:36.7 °C (98 °F), Max:37.2 °C (98.9 °F)  Temperature: 36.7 °C (98.1 °F)  Pulse  Av.9  Min: 82  Max: 118   Blood Pressure: 146/96     Respiratory:    Respiration: 20, Pulse Oximetry: 97 %        RUL Breath Sounds: Clear, RML Breath Sounds: Clear, RLL Breath Sounds: Diminished, VANITA Breath Sounds: Clear, LLL Breath Sounds: Diminished  Fluids:     Weight: 81.9 kg (180 lb 8.9 oz)  GI/Nutrition:  Orders Placed This Encounter   Procedures   • Diet Order Renal     Standing Status:   Standing     Number of Occurrences:   1     Order Specific Question:   Diet:     Answer:   Renal [8]     Order Specific Question:   Consistency/Fluid modifications:     Answer:   2000 ml Fluid Restriction [11]     Lab Results:  Recent Labs      18   0335  18   1009  18   0500   WBC  13.3*  15.0*  14.0*   RBC  2.29*  2.66*  2.48*   HEMOGLOBIN  7.0*  8.1*  7.6*   HEMATOCRIT  21.6*  25.0*  23.3*   MCV  94.3  94.0  94.0   MCH  30.6  30.5  30.6   MCHC  32.4*  32.4*  32.6*   RDW  51.4*  51.9*  51.1*   PLATELETCT  223  252  252   MPV  10.1  10.3  10.0     Recent Labs      18   0435  18   0335  18   0500   SODIUM  137  136  136   POTASSIUM  5.0  5.0  4.6   CHLORIDE  108  108  108   CO2  21  20  21   GLUCOSE  100*  106*  109*   BUN  81*  83*  83*   CREATININE  3.49*  3.32*  2.86*   CALCIUM  8.3*  8.1*  8.2*                          Medical Decision Making, by Problem:  Active Hospital Problems    Diagnosis   • *Acute on Chronic Anemia due to traumatic retroperitoneal hematoma S/P renal biopsy  [S36.892A]   • Systemic lupus erythematosus with glomerular disease (HCC) [M32.14]   • Acute renal failure secondary to lupus nephritis  [N17.9]   • Pericardial effusion [I31.3]   • Hypertension [I10]   • Renal anasarca [N04.9]   • Asymptomatic bacteriuria [R82.71]   • Hyperphosphatemia [E83.39]   • Anemia of chronic disease/autoimmune  [D64.9]       Plan:  1. Pericardial effusion:  - ECHO 6/24: large pleural effusion  - repeat ECHO: showed per Dr. Raya showed interval increase in size of pericardial effusion, not cardiac tamponade. Follow up ECHO as outpatient.     2.Cardiomyopathy, assume nonischemic:  - ECHO: ef 25%.  - continue imdur and hydralazine (if ok with lupus)    3. Severe anemia:  - H/H: 8.1/25  - primary following       Cardiology will sign off, follow up as outpatient. Please call us with any questions/ concerns.     Quality-Core Measures

## 2018-06-28 NOTE — PROGRESS NOTES
Received handoff report. Assumed care of pt. Pt resting in bed. Patient not in distress, AAOX 4. Initial assessment conducted. Safety precautions in place. Call light within reach. Will continue to conduct hourly rounds. Educated to call for assistance.

## 2018-06-28 NOTE — CONSULTS
Note Author: Rupa Rivas M.D.       Name Mendy Solano     1990   Age/Sex 28 y.o. 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     Interval hx  18 Pt has ongoing abdominal pain, but no hematuria, TEMP HD catheter placed yesterday  18 EPO received yesterday, ongoing abdominla pain, 1st dose of rituximab  18 no acute issues since yesterday, notes that her anasarca is much improved.   18 Episode of vomitingx1 yesterday. No other acute issues. I/O: 100/250  2018 I/O 100/625, increased sob, orthopnea. Will add lasix and metolazone, hold other bp medications for now.   2018:  Anxious to go home, I/O 1300/1300, however patient does note that she went to the bathroom quite a few times overnight.  Her symptoms have improved and she notes no shortness of breath or orthopnea she does note that she has some abdominal heaviness and walking around for a bit.  Overall, her abdominal pain is improved.    Review of Systems   Constitutional: Negative for malaise/fatigue.   HENT: Negative.    Eyes: Negative.    Respiratory: Negative for shortness of breath.    Cardiovascular: Negative for palpitations and orthopnea.   Gastrointestinal: Negative for abdominal pain, diarrhea, nausea and vomiting.        Improving abd pain   Genitourinary: Negative.  Negative for dysuria and urgency.  "  Musculoskeletal: Negative for myalgias and neck pain.   Skin: Negative.    Neurological: Positive for weakness. Negative for dizziness and headaches.   Endo/Heme/Allergies: Does not bruise/bleed easily.   Psychiatric/Behavioral: Negative.          Past Medical History:   Past Medical History:   Diagnosis Date   • Anemia    • Hypertension    • IUD (intrauterine device) in place 06/20/2018   • Lupus    • Renal disorder 06/20/2018    \"Stage 3 kidney disease\"   • Ruptured ectopic pregnancy        Past Surgical History:  Past Surgical History:   Procedure Laterality Date   • PELVISCOPY  8/30/08    Performed by JEREMIAH MENJIVAR at SUR[facility] ORS   • LYSIS ADHESIONS GYN  8/30/08    Performed by JEREMIAH MENJIVAR at [facility]       Current Outpatient Medications:  Home Medications     Reviewed by Valeria Collins (Pharmacy Tech) on 06/22/18 at 0930  Med List Status: Complete   Medication Last Dose Status   candesartan (ATACAND) 4 MG Tab  Active   ferrous sulfate 325 (65 Fe) MG tablet  Active   hydrocodone-acetaminophen (NORCO) 5-325 MG Tab per tablet 6/21/2018 Active   hydroxychloroquine (PLAQUENIL) 200 MG Tab 6/20/2018 Active   losartan (COZAAR) 25 MG Tab  Active   mycophenolate sodium (MYFORTIC) 360 MG Tablet Delayed Response tablet week Active   predniSONE (DELTASONE) 20 MG Tab 6/20/2018 Active   vitamin D, Ergocalciferol, (DRISDOL) 77894 units Cap capsule  Active                Medication Allergy/Sensitivities:  Allergies   Allergen Reactions   • Nkda [No Known Drug Allergy]      Family History:  Family History   Problem Relation Age of Onset   • Arthritis Mother      Social History:  Social History     Social History   • Marital status: Single     Spouse name: N/A   • Number of children: N/A   • Years of education: N/A     Occupational History   • Not on file.     Social History Main Topics   • Smoking status: Never Smoker   • Smokeless tobacco: Never Used   • Alcohol use No   • Drug use: " "No   • Sexual activity: Not on file     Other Topics Concern   • Not on file     Social History Narrative   • No narrative on file     Living situation: At home  PCP : Pcp Pt States None        Physical Exam     Vitals:    06/28/18 0000 06/28/18 0110 06/28/18 0400 06/28/18 0800   BP: 159/100 128/80 142/84 146/96   Pulse: 92 86 85 88   Resp: 17  17 20   Temp: 37 °C (98.6 °F)  36.7 °C (98.1 °F) 36.7 °C (98.1 °F)   TempSrc:       SpO2: 95%  98% 97%   Weight:       Height:         Body mass index is 30.05 kg/m².  /96   Pulse 88   Temp 36.7 °C (98.1 °F)   Resp 20   Ht 1.651 m (5' 5\")   Wt 81.9 kg (180 lb 8.9 oz)   SpO2 97%   Breastfeeding? No   BMI 30.05 kg/m²   O2 therapy: Pulse Oximetry: 97 %, O2 (LPM): 0, O2 Delivery: None (Room Air)    Physical Exam   Constitutional: She is oriented to person, place, and time. No distress.   HENT:   Head: Normocephalic and atraumatic.   Hemodialysis catheter in place.    Eyes: No scleral icterus.   Neck:   RIght IJ temp cath   Cardiovascular: Normal rate, regular rhythm, normal heart sounds and intact distal pulses.  Exam reveals no gallop and no friction rub.    No murmur heard.  Pulmonary/Chest: Effort normal. No respiratory distress. She has no wheezes. She has rales.   Crackles mostly bibasilar today, improved from yesterday   Abdominal: Soft. She exhibits no distension. There is tenderness. There is no rebound and no guarding.   No tenderness on palpation only if she lies on her left side for a while it can be reproduced as per patient       Musculoskeletal: Normal range of motion. She exhibits no edema, tenderness or deformity.   Lymphadenopathy:     She has no cervical adenopathy.   Neurological: She is alert and oriented to person, place, and time.   Skin: Skin is warm and dry. She is not diaphoretic.   Nursing note and vitals reviewed.            Data Review       Old Records Request:   Completed  Current Records review/summary: Completed    Lab Data " Review:  Recent Results (from the past 24 hour(s))   PROTEIN/CREAT RATIO URINE    Collection Time: 06/27/18 11:15 AM   Result Value Ref Range    Total Protein, Urine 188.7 (H) 0.0 - 15.0 mg/dL    Creatinine, Random Urine 73.70 mg/dL    Protein Creatinine Ratio 2560 (H) 10 - 107 mg/g   CBC WITH DIFFERENTIAL    Collection Time: 06/28/18  5:00 AM   Result Value Ref Range    WBC 14.0 (H) 4.8 - 10.8 K/uL    RBC 2.48 (L) 4.20 - 5.40 M/uL    Hemoglobin 7.6 (L) 12.0 - 16.0 g/dL    Hematocrit 23.3 (L) 37.0 - 47.0 %    MCV 94.0 81.4 - 97.8 fL    MCH 30.6 27.0 - 33.0 pg    MCHC 32.6 (L) 33.6 - 35.0 g/dL    RDW 51.1 (H) 35.9 - 50.0 fL    Platelet Count 252 164 - 446 K/uL    MPV 10.0 9.0 - 12.9 fL    Neutrophils-Polys 73.90 (H) 44.00 - 72.00 %    Lymphocytes 14.30 (L) 22.00 - 41.00 %    Monocytes 8.40 0.00 - 13.40 %    Eosinophils 0.10 0.00 - 6.90 %    Basophils 0.10 0.00 - 1.80 %    Immature Granulocytes 3.20 (H) 0.00 - 0.90 %    Nucleated RBC 0.10 /100 WBC    Neutrophils (Absolute) 10.36 (H) 2.00 - 7.15 K/uL    Lymphs (Absolute) 2.00 1.00 - 4.80 K/uL    Monos (Absolute) 1.18 (H) 0.00 - 0.85 K/uL    Eos (Absolute) 0.02 0.00 - 0.51 K/uL    Baso (Absolute) 0.02 0.00 - 0.12 K/uL    Immature Granulocytes (abs) 0.45 (H) 0.00 - 0.11 K/uL    NRBC (Absolute) 0.02 K/uL   RENAL FUNCTION PANEL    Collection Time: 06/28/18  5:00 AM   Result Value Ref Range    Sodium 136 135 - 145 mmol/L    Potassium 4.6 3.6 - 5.5 mmol/L    Chloride 108 96 - 112 mmol/L    Co2 21 20 - 33 mmol/L    Glucose 109 (H) 65 - 99 mg/dL    Creatinine 2.86 (H) 0.50 - 1.40 mg/dL    Bun 83 (HH) 8 - 22 mg/dL    Calcium 8.2 (L) 8.5 - 10.5 mg/dL    Phosphorus 5.9 (H) 2.5 - 4.5 mg/dL    Albumin 2.0 (L) 3.2 - 4.9 g/dL   MAGNESIUM    Collection Time: 06/28/18  5:00 AM   Result Value Ref Range    Magnesium 2.1 1.5 - 2.5 mg/dL   ESTIMATED GFR    Collection Time: 06/28/18  5:00 AM   Result Value Ref Range    GFR If  24 (A) >60 mL/min/1.73 m 2    GFR If Non African  American 19 (A) >60 mL/min/1.73 m 2       Imaging/Procedures Review:    Independant Imaging Review: Completed  DX-CHEST-2 VIEWS   Final Result         1.  Left lung base atelectasis or infiltrate with small effusion.   2.  Cardiomegaly, new since prior study. Compatible with pericardial effusion diagnosed June 21, 2018 by CT abdomen.      ECHOCARDIOGRAM COMP W/O CONT   Final Result      ECHOCARDIOGRAM COMP W/O CONT   Final Result      IR-EMBOLIZATION   Final Result      1.  Left renal angiography showing no evidence of active extravasation, pseudoaneurysm, AV fistula, or other evidence of vascular injury. No coil embolization was indicated.                  INTERPRETING LOCATION: [facility], [place], [address]      IR-CVC NON TUNNELED > AGE 4   Final Result      1. ULTRASOUND AND FLUOROSCOPIC GUIDED PLACEMENT OF A RIGHT INTERNAL JUGULAR 12 [place] TRIALYSIS TRIPLE LUMEN NON-TUNNELED HEMODIALYSIS CATHETER.      2. THE HEMODIALYSIS CATHETER MAY BE USED IMMEDIATELY AS CLINICALLY INDICATED. FLUSHES PER PROTOCOL.      US-RENAL   Final Result      Redemonstration of large hematoma anterior and lateral to the left kidney.      Moderate pelvic ascites. Trace perihepatic ascites.      CT-ABDOMEN-PELVIS W/O   Final Result         1. Large left perinephric space and left retroperitoneal hemorrhage and hematoma extending to the left lower quadrant and pelvis..      2. Large pericardial effusion. Small right pleural effusion.      3. Diffuse anasarca, abdominal and pelvic ascites.      CRITICAL RESULT READ BACK: Preliminary findings discussed with and critical read back performed by Dr. BIMAL SANTACRUZ in the Emergency Department via telephone on 6/21/2018 10:04 PM                Assessment:     1)AWA with fluid overload  -Multifactorial likely worsening SLE nephritis (bx confirmed), and received contrast for renal angio, which could have contributed to contrast induced nephropathy as well.  Suspect some element of cardiorenal as  well.  -Creatinine improving with diuretics  -Continue Lasix 80mg IV bid with metolazone  -Monitor I/O     2)CKD stage III  -HD catheter in place, continue for now.  -secondary to lupus nephritis, Cr 2.08 on 05/22/2018.  - Proteinuria, rising anti-Ds DNA and low complements.  -Low Albumin 2.4    3)Hyperkalemia - resolved    4) Acute on chronic Anemia in the setting of large retroperitoneal hematoma Iatrogenic after left sided renal biopsy.  -s/p 3 units of PRBCS  -s/p IR guided arteriogram requiring no embolization.   -Remains hemodynamically stable.  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     8) Secondary hyperaparathyroidism  -Intact PTH 68 and Ca 7.9(corrected 8.7), albumin 2.4    9) Hyperphosphatemia  -Phosp 9.4  -on sevelamer    10) Acute on chronic Large Pericardial Effusion without hemodynamic compromise  -cardiology following  -ECHO shows EF of 25%, repeat echo shows some evidence of hemodynamic compromise on RV mode however EF has improved to 35% with global hypokinesis.  Cardiology following  -on coreg, imdur, hydralazine, and losartan  -will hold all other bp medications for now    11) Dyslipidemia  - 2/2 to nephrotic syndrome    12) HTN-controlled  -will hold other bp medications for now  -lasix and metolazone.     Quality Measures  Quality-Core Measures   Reviewed items::  Labs reviewed, Medications reviewed and Radiology images reviewed    We will continue to follow the patient with you. Please contact us with further questions or concerns

## 2018-06-28 NOTE — DISCHARGE SUMMARY
Internal Medicine Discharge Summary  Note Author: Ramírez Knight M.D.       Name Mendy Solano     1990   Age/Sex 28 y.o. female   MRN [id number]       Admit Date:  2018       Discharge Date:   18    Service:   [facility] Internal Medicine Purple Team  Attending Physician(s):   Dr. Hernandez       Senior Resident(s):   Dr. Mata  Beto Resident(s):   Dr. Knight      Primary Diagnosis:     Acute on Chronic Anemia due to traumatic retroperitoneal hematoma S/P renal biopsy  POA: Yes    Secondary Diagnoses:                  Acute renal failure secondary to lupus nephritis  POA: Yes    Pericardial effusion POA: Yes    Systemic lupus erythematosus with glomerular disease (HCC) POA: Yes    Renal anasarca POA: Yes    Hypertension POA: Unknown    Hyperphosphatemia POA: Yes    Anemia of chronic disease/autoimmune  POA: Yes    Hyperkalemia POA: Yes    Asymptomatic bacteriuria POA: Yes      Hospital Summary (Brief Narrative):       28 year old female with a past medical history significant for lupus (on mycophenolate, hydroxychloroquine), hypertension (on ACEi), and CKD III (due to lupus nephritis), admitted with large perinephric hematoma and retroperitoneal hemorrhage after left renal biopsy (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 She was also found to have anasarca, acute on chronic kidney injury, and large pericardial effusion with evidence of tamponade effect. She was started on rituximab, in addition to prednisone and hydroxychloroquine per nephrology. Echocardiogram was consistent with dilated cardiomyopathy with ejection fraction of 25% (most likely due to lupus). She was managed conservatively with medical therapy (labetalol, Imdur, amlodipine, and ceftriaxone) and her condition improved without need for hemodialysis. Her kidney function and anasarca improved after diuresis. Cardiology recommended repeat echocardiogram as outpatient. Nephrology recommended continued outpatient  diuresis. Patient was discharged in stable condition for outpatient follow up.    Patient /Hospital Summary (Details -- Problem Oriented) :          * Acute on Chronic Anemia due to traumatic retroperitoneal hematoma S/P renal biopsy    Assessment & Plan    - CT abd: Large left perinephric space and left retroperitoneal hemorrhage and hematoma extending to the left lower quadrant and pelvis (6/21)  - No evidence of extravasation, no amenable for IR embolization   - S/P 3U pRBC in ED for Hgb 6.8 on admission   - Hgb now stable        Hypertension   Assessment & Plan    - BP goal <150/90   - hold home ACEi due to kidney injury  - Imdur per Cardiology, recs appreciated        Systemic lupus erythematosus with glomerular disease (HCC)   Assessment & Plan    - Presented with lupus nephritis and pericardial effusion   - Protein:creatinine ratio 2560  - Continue hydroxychloroquine, prednisone, rituximab per Nephrology, recs appreciated  - Outpatient rheumatology follow up         Renal anasarca   Assessment & Plan    - s/p fluids and PRBCs in the setting of acute kidney injury   - Lasix 80 mg PO BID and metolazone 5 mg daily per Nephrology, recs appreciated        Pericardial effusion   Assessment & Plan    - Repeat echo: worse large pericardial effusion with some evidence of hemodynamic compromise  - Hemodynamically stable, no intervention needed per Cardiology  - Suspect SLE induced vs uremia vs drug induced   - Repeat echo outpatient per Cardiology, recs appreciated        Acute renal failure secondary to lupus nephritis    Assessment & Plan    - s/p Renal biopsy consistent with lupus nephritis (6/21)  - Labs 3/14/17 showed Cr 2.08 GFR 31  - Cr improving  - Non-oliguric acute on chronic kidney injury   - HD catheter placed on 6/22, removed (no HD required)        Anemia of chronic disease/autoimmune    Assessment & Plan    - Iron studies consistent with ACD        Hyperphosphatemia   Assessment & Plan    - Secondary to  renal failure  - Goal phos <6  - Continue Sevelamir        Asymptomatic bacteriuria-resolved as of 6/27/2018   Assessment & Plan    - UA notable for bacteria, wbc, rbc   - s/p ceftriaxone            Consultants:     Pulmonology/Critical Care - Dr. llanes  Nephrology - Dr. Gar  Cardiology - Dr. Vale    Procedures:        RIGHT INTERNAL JUGULAR NON TUNNELED TEMPORARY 12.5F MAHURKAR PLACEMENT WITH U/S AND FLUOROSCOPIC GUIDANCE  R CFA PUNCTURE, LEFT RENAL ANGIOGRAM, R CFA 6F ANGIOSEAL    Imaging/ Testing:      DX-CHEST-2 VIEWS   Final Result         1.  Left lung base atelectasis or infiltrate with small effusion.   2.  Cardiomegaly, new since prior study. Compatible with pericardial effusion diagnosed June 21, 2018 by CT abdomen.      ECHOCARDIOGRAM COMP W/O CONT   Final Result      ECHOCARDIOGRAM COMP W/O CONT   Final Result      IR-EMBOLIZATION   Final Result      1.  Left renal angiography showing no evidence of active extravasation, pseudoaneurysm, AV fistula, or other evidence of vascular injury. No coil embolization was indicated.                  INTERPRETING LOCATION: [address] [place] [address], [facility], [facility]      IR-CVC NON TUNNELED > AGE 4   Final Result      1. ULTRASOUND AND FLUOROSCOPIC GUIDED PLACEMENT OF A RIGHT INTERNAL JUGULAR 12 Y[place] TRIALYSIS TRIPLE LUMEN NON-TUNNELED HEMODIALYSIS CATHETER.      2. THE HEMODIALYSIS CATHETER MAY BE USED IMMEDIATELY AS CLINICALLY INDICATED. FLUSHES PER PROTOCOL.      US-RENAL   Final Result      Redemonstration of large hematoma anterior and lateral to the left kidney.      Moderate pelvic ascites. Trace perihepatic ascites.      CT-ABDOMEN-PELVIS W/O   Final Result         1. Large left perinephric space and left retroperitoneal hemorrhage and hematoma extending to the left lower quadrant and pelvis..      2. Large pericardial effusion. Small right pleural effusion.      3. Diffuse anasarca, abdominal and pelvic ascites.      CRITICAL RESULT READ BACK: Preliminary findings  discussed with and critical read back performed by Dr. BIMAL SANTACRUZ in the Emergency Department via telephone on 6/21/2018 10:04 PM          Discharge Medications:         Medication Reconciliation: Completed       Medication List      START taking these medications      Instructions   cyanocobalamin 1000 MCG Tabs  Start taking on:  6/30/2018  Commonly known as:  VITAMIN B12   Take 1 Tab by mouth every day for 30 days.  Dose:  1000 mcg     furosemide 80 MG Tabs  Commonly known as:  LASIX   Take 1 Tab by mouth 2 Times a Day for 30 days.  Dose:  80 mg     isosorbide mononitrate SR 60 MG Tb24  Start taking on:  6/30/2018  Commonly known as:  IMDUR   Take 1 Tab by mouth every day for 30 days.  Dose:  60 mg     metOLazone 5 MG Tabs  Start taking on:  6/30/2018  Commonly known as:  ZAROXOLYN   Take 1 Tab by mouth every day for 30 days.  Dose:  5 mg        CONTINUE taking these medications      Instructions   ferrous sulfate 325 (65 Fe) MG tablet   Take 325 mg by mouth every day.  Dose:  325 mg     HYDROcodone-acetaminophen 5-325 MG Tabs per tablet  Commonly known as:  NORCO   Take 1 Tab by mouth every 6 hours as needed.  Dose:  1 Tab     hydroxychloroquine 200 MG Tabs  Commonly known as:  PLAQUENIL   Take 200 mg by mouth 2 times a day.  Dose:  200 mg     mycophenolate sodium 360 MG Tbec tablet  Commonly known as:  MYFORTIC   Take 720 mg by mouth 2 Times a Day.  Dose:  720 mg     predniSONE 20 MG Tabs  Commonly known as:  DELTASONE   Take 60 mg by mouth every day.  Dose:  60 mg     vitamin D (Ergocalciferol) 97033 units Caps capsule  Commonly known as:  DRISDOL   Take 50,000 Units by mouth every 7 days.  Dose:  67827 Units        STOP taking these medications    candesartan 4 MG Tabs  Commonly known as:  ATACAND     losartan 25 MG Tabs  Commonly known as:  COZAAR            Disposition:   Home    Diet:   Renal, 2 L fluid restriction    Activity:   As tolerated    Instructions:       The patient was instructed to return to  the ER in the event of worsening symptoms. I have counseled the patient on the importance of compliance and the patient has agreed to proceed with all medical recommendations and follow up plan indicated above.   The patient understands that all medications come with benefits and risks. Risks may include permanent injury or death and these risks can be minimized with close reassessment and monitoring.        Primary Care Provider:    Rosa Villeda M.D.  Discharge summary faxed to primary care provider:  Deferred  Copy of discharge summary given to the patient: Deferred      Follow up appointment details :      Jul 11, 2018  9:00 AM PDT  Follow Up Visit with Hedy Mauro M.D.  [facility] ([address]) [address] [place] [address] [id number]  [place] [address] [phone]   You will be receiving a confirmation call a few days before your appointment from our automated call confirmation system.   Jul 19, 2018  2:15 PM PDT  New Patient with Rosa Villeda M.D.  [facility] - Internal Medicine (--) [address] [place] [address]  [address]  [place] [address] [phone]   Please bring Photo ID, Insurance Cards, All Medication Bottles and copies of any legal documents (such as Living Will, Power of ) If speaking a language besides English please bring an adult . Please arrive 30 minutes prior for check in and registration. You will be receiving a confirmation call a few days before your appointment from our automated call confirmation system.   Jul 23, 2018  1:20 PM [facility]  Hospital Follow up with JESSICA Evangelista.  [facility] (--) [address] [facility]  [place] [address] [phone]       Pending Studies:        None    Time spent on discharge day patient visit, preparing discharge paperwork and arranging for patient follow up.    Summary of follow up issues:   Repeat echocardiogram outpatient for large pericardial  effusion, follow up with Cardiology  Follow up Nephrology for acute on chronic kidney injury, lupus nephritis    Discharge Time (Minutes) :    45  Hospital Course Type:  Inpatient Stay >2 midnights      Condition on Discharge    Stable  ______________________________________________________________________    Interval history/exam for day of discharge:    Denies complaints    Vitals:    06/28/18 2000 06/29/18 0000 06/29/18 0400 06/29/18 0800   BP: 151/91 145/87 149/109 149/98   Pulse: 93 89 91 94   Resp: 17 18 17 18   Temp: 36.9 °C (98.5 °F) 36.8 °C (98.3 °F) 37.1 °C (98.7 °F) 36.8 °C (98.3 °F)   TempSrc:       SpO2: 98% 96% 97% 92%   Weight: 79.8 kg (175 lb 14.8 oz)   76.7 kg (169 lb 1.5 oz)   Height:         Weight/BMI: Body mass index is 30.05 kg/m².  Pulse Oximetry: 97 %, O2 (LPM): 0, O2 Delivery: None (Room Air)    General: NAD  CVS: RRR  PULM: CTAB    Most Recent Labs:    Lab Results   Component Value Date/Time    WBC 15.8 (H) 06/29/2018 02:30 AM    RBC 2.58 (L) 06/29/2018 02:30 AM    HEMOGLOBIN 7.7 (L) 06/29/2018 02:30 AM    HEMATOCRIT 24.7 (L) 06/29/2018 02:30 AM    MCV 95.7 06/29/2018 02:30 AM    MCH 29.8 06/29/2018 02:30 AM    MCHC 31.2 (L) 06/29/2018 02:30 AM    MPV 10.0 06/29/2018 02:30 AM    NEUTSPOLYS 76.10 (H) 06/29/2018 02:30 AM    LYMPHOCYTES 12.70 (L) 06/29/2018 02:30 AM    MONOCYTES 8.30 06/29/2018 02:30 AM    EOSINOPHILS 0.00 06/29/2018 02:30 AM    BASOPHILS 0.20 06/29/2018 02:30 AM    HYPOCHROMIA 1+ 06/21/2018 08:44 PM    ANISOCYTOSIS 1+ 06/21/2018 08:44 PM      Lab Results   Component Value Date/Time    SODIUM 136 06/29/2018 02:30 AM    POTASSIUM 4.3 06/29/2018 02:30 AM    CHLORIDE 108 06/29/2018 02:30 AM    CO2 22 06/29/2018 02:30 AM    GLUCOSE 142 (H) 06/29/2018 02:30 AM    BUN 83 (HH) 06/29/2018 02:30 AM    CREATININE 2.51 (H) 06/29/2018 02:30 AM      Lab Results   Component Value Date/Time    ALTSGPT 5 06/21/2018 07:55 PM    ASTSGOT 8 (L) 06/21/2018 07:55 PM    ALKPHOSPHAT 30 06/21/2018  07:55 PM    TBILIRUBIN 0.3 06/21/2018 07:55 PM    ALBUMIN 2.0 (L) 06/29/2018 02:30 AM    GLOBULIN 3.9 (H) 06/21/2018 07:55 PM    INR 1.12 06/21/2018 07:55 PM    MACROCYTOSIS 1+ 06/21/2018 08:44 PM     Lab Results   Component Value Date/Time    PROTHROMBTM 14.1 06/21/2018 07:55 PM    INR 1.12 06/21/2018 07:55 PM      Results for EJ AZAR (MRN [id number]) as of 6/29/2018 10:49   Ref. Range 6/21/2018 19:55   WBC Latest Ref Range: 4.8 - 10.8 K/uL 12.1 (H)   RBC Latest Ref Range: 4.20 - 5.40 M/uL 2.34 (L)   Hemoglobin Latest Ref Range: 12.0 - 16.0 g/dL 6.8 (L)   Hematocrit Latest Ref Range: 37.0 - 47.0 % 22.3 (L)   MCV Latest Ref Range: 81.4 - 97.8 fL 95.3   MCH Latest Ref Range: 27.0 - 33.0 pg 29.1   MCHC Latest Ref Range: 33.6 - 35.0 g/dL 30.5 (L)   RDW Latest Ref Range: 35.9 - 50.0 fL 53.2 (H)   Platelet Count Latest Ref Range: 164 - 446 K/uL 263   MPV Latest Ref Range: 9.0 - 12.9 fL 9.5   Neutrophils-Polys Latest Ref Range: 44.00 - 72.00 % 83.00 (H)   Neutrophils (Absolute) Latest Ref Range: 2.00 - 7.15 K/uL 10.04 (H)   Lymphocytes Latest Ref Range: 22.00 - 41.00 % 9.80 (L)   Lymphs (Absolute) Latest Ref Range: 1.00 - 4.80 K/uL 1.19   Monocytes Latest Ref Range: 0.00 - 13.40 % 5.40   Monos (Absolute) Latest Ref Range: 0.00 - 0.85 K/uL 0.65   Eosinophils Latest Ref Range: 0.00 - 6.90 % 0.00   Eos (Absolute) Latest Ref Range: 0.00 - 0.51 K/uL 0.00   Basophils Latest Ref Range: 0.00 - 1.80 % 0.00   Baso (Absolute) Latest Ref Range: 0.00 - 0.12 K/uL 0.00   Metamyelocytes Latest Units: % 1.80   Nucleated RBC Latest Units: /100 WBC 0.00   NRBC (Absolute) Latest Units: K/uL 0.00   Plt Estimation Unknown Normal   RBC Morphology Unknown Present   Anisocytosis Unknown 1+   Macrocytosis Unknown 1+   Microcytosis Unknown 1+   Polychromia Unknown 1+   Schistocytes Unknown 1+   Peripheral Smear Review Unknown see below   Manual Diff Status Unknown PERFORMED   Sodium Latest Ref Range: 135 - 145 mmol/L 138   Potassium Latest  Ref Range: 3.6 - 5.5 mmol/L 5.1   Chloride Latest Ref Range: 96 - 112 mmol/L 114 (H)   Co2 Latest Ref Range: 20 - 33 mmol/L 16 (L)   Anion Gap Latest Ref Range: 0.0 - 11.9  8.0   Glucose Latest Ref Range: 65 - 99 mg/dL 117 (H)   Bun Latest Ref Range: 8 - 22 mg/dL 69 (H)   Creatinine Latest Ref Range: 0.50 - 1.40 mg/dL 3.78 (H)   GFR If  Latest Ref Range: >60 mL/min/1.73 m 2 17 (A)   GFR If Non  Latest Ref Range: >60 mL/min/1.73 m 2 14 (A)   Calcium Latest Ref Range: 8.5 - 10.5 mg/dL 7.7 (L)   AST(SGOT) Latest Ref Range: 12 - 45 U/L 8 (L)   ALT(SGPT) Latest Ref Range: 2 - 50 U/L 5   Alkaline Phosphatase Latest Ref Range: 30 - 99 U/L 30   Total Bilirubin Latest Ref Range: 0.1 - 1.5 mg/dL 0.3   Albumin Latest Ref Range: 3.2 - 4.9 g/dL 2.4 (L)   Total Protein Latest Ref Range: 6.0 - 8.2 g/dL 6.3   Globulin Latest Ref Range: 1.9 - 3.5 g/dL 3.9 (H)   A-G Ratio Latest Units: g/dL 0.6   PT Latest Ref Range: 12.0 - 14.6 sec 14.1   INR Latest Ref Range: 0.87 - 1.13  1.12

## 2018-06-29 ENCOUNTER — PATIENT OUTREACH (OUTPATIENT)
Dept: HEALTH INFORMATION MANAGEMENT | Facility: OTHER | Age: 28
End: 2018-06-29

## 2018-06-29 VITALS
OXYGEN SATURATION: 95 % | SYSTOLIC BLOOD PRESSURE: 147 MMHG | TEMPERATURE: 99 F | DIASTOLIC BLOOD PRESSURE: 91 MMHG | HEART RATE: 99 BPM | HEIGHT: 65 IN | BODY MASS INDEX: 28.17 KG/M2 | RESPIRATION RATE: 18 BRPM | WEIGHT: 169.09 LBS

## 2018-06-29 LAB
ALBUMIN SERPL BCP-MCNC: 2 G/DL (ref 3.2–4.9)
BASOPHILS # BLD AUTO: 0.2 % (ref 0–1.8)
BASOPHILS # BLD: 0.03 K/UL (ref 0–0.12)
BUN SERPL-MCNC: 83 MG/DL (ref 8–22)
CALCIUM SERPL-MCNC: 8.1 MG/DL (ref 8.5–10.5)
CHLORIDE SERPL-SCNC: 108 MMOL/L (ref 96–112)
CO2 SERPL-SCNC: 22 MMOL/L (ref 20–33)
CREAT SERPL-MCNC: 2.51 MG/DL (ref 0.5–1.4)
EOSINOPHIL # BLD AUTO: 0 K/UL (ref 0–0.51)
EOSINOPHIL NFR BLD: 0 % (ref 0–6.9)
ERYTHROCYTE [DISTWIDTH] IN BLOOD BY AUTOMATED COUNT: 51.8 FL (ref 35.9–50)
GLUCOSE SERPL-MCNC: 142 MG/DL (ref 65–99)
HCT VFR BLD AUTO: 24.7 % (ref 37–47)
HGB BLD-MCNC: 7.7 G/DL (ref 12–16)
IMM GRANULOCYTES # BLD AUTO: 0.42 K/UL (ref 0–0.11)
IMM GRANULOCYTES NFR BLD AUTO: 2.7 % (ref 0–0.9)
LYMPHOCYTES # BLD AUTO: 2 K/UL (ref 1–4.8)
LYMPHOCYTES NFR BLD: 12.7 % (ref 22–41)
MAGNESIUM SERPL-MCNC: 2.1 MG/DL (ref 1.5–2.5)
MCH RBC QN AUTO: 29.8 PG (ref 27–33)
MCHC RBC AUTO-ENTMCNC: 31.2 G/DL (ref 33.6–35)
MCV RBC AUTO: 95.7 FL (ref 81.4–97.8)
MONOCYTES # BLD AUTO: 1.31 K/UL (ref 0–0.85)
MONOCYTES NFR BLD AUTO: 8.3 % (ref 0–13.4)
NEUTROPHILS # BLD AUTO: 11.99 K/UL (ref 2–7.15)
NEUTROPHILS NFR BLD: 76.1 % (ref 44–72)
NRBC # BLD AUTO: 0.02 K/UL
NRBC BLD-RTO: 0.1 /100 WBC
PHOSPHATE SERPL-MCNC: 5.8 MG/DL (ref 2.5–4.5)
PLATELET # BLD AUTO: 283 K/UL (ref 164–446)
PMV BLD AUTO: 10 FL (ref 9–12.9)
POTASSIUM SERPL-SCNC: 4.3 MMOL/L (ref 3.6–5.5)
RBC # BLD AUTO: 2.58 M/UL (ref 4.2–5.4)
SODIUM SERPL-SCNC: 136 MMOL/L (ref 135–145)
WBC # BLD AUTO: 15.8 K/UL (ref 4.8–10.8)

## 2018-06-29 PROCEDURE — 700102 HCHG RX REV CODE 250 W/ 637 OVERRIDE(OP): Performed by: INTERNAL MEDICINE

## 2018-06-29 PROCEDURE — 700111 HCHG RX REV CODE 636 W/ 250 OVERRIDE (IP): Performed by: STUDENT IN AN ORGANIZED HEALTH CARE EDUCATION/TRAINING PROGRAM

## 2018-06-29 PROCEDURE — A9270 NON-COVERED ITEM OR SERVICE: HCPCS | Performed by: STUDENT IN AN ORGANIZED HEALTH CARE EDUCATION/TRAINING PROGRAM

## 2018-06-29 PROCEDURE — 80069 RENAL FUNCTION PANEL: CPT

## 2018-06-29 PROCEDURE — 700111 HCHG RX REV CODE 636 W/ 250 OVERRIDE (IP): Performed by: HOSPITALIST

## 2018-06-29 PROCEDURE — 700102 HCHG RX REV CODE 250 W/ 637 OVERRIDE(OP): Performed by: STUDENT IN AN ORGANIZED HEALTH CARE EDUCATION/TRAINING PROGRAM

## 2018-06-29 PROCEDURE — 99238 HOSP IP/OBS DSCHRG MGMT 30/<: CPT | Mod: GC | Performed by: INTERNAL MEDICINE

## 2018-06-29 PROCEDURE — 83735 ASSAY OF MAGNESIUM: CPT

## 2018-06-29 PROCEDURE — 700102 HCHG RX REV CODE 250 W/ 637 OVERRIDE(OP): Performed by: HOSPITALIST

## 2018-06-29 PROCEDURE — 85025 COMPLETE CBC W/AUTO DIFF WBC: CPT

## 2018-06-29 PROCEDURE — A9270 NON-COVERED ITEM OR SERVICE: HCPCS | Performed by: INTERNAL MEDICINE

## 2018-06-29 PROCEDURE — A9270 NON-COVERED ITEM OR SERVICE: HCPCS | Performed by: HOSPITALIST

## 2018-06-29 RX ORDER — FUROSEMIDE 40 MG/1
80 TABLET ORAL
Status: DISCONTINUED | OUTPATIENT
Start: 2018-06-29 | End: 2018-06-29 | Stop reason: HOSPADM

## 2018-06-29 RX ORDER — FUROSEMIDE 80 MG
80 TABLET ORAL 2 TIMES DAILY
Qty: 60 TAB | Refills: 0 | Status: SHIPPED | OUTPATIENT
Start: 2018-06-29 | End: 2018-07-29

## 2018-06-29 RX ORDER — METOLAZONE 5 MG/1
5 TABLET ORAL DAILY
Qty: 30 TAB | Refills: 0 | Status: SHIPPED | OUTPATIENT
Start: 2018-06-30 | End: 2018-07-30

## 2018-06-29 RX ORDER — ISOSORBIDE MONONITRATE 60 MG/1
60 TABLET, EXTENDED RELEASE ORAL DAILY
Qty: 30 TAB | Refills: 0 | Status: SHIPPED | OUTPATIENT
Start: 2018-06-30 | End: 2018-07-30

## 2018-06-29 RX ADMIN — SEVELAMER CARBONATE 800 MG: 800 TABLET, FILM COATED ORAL at 08:25

## 2018-06-29 RX ADMIN — HYDROCODONE BITARTRATE AND ACETAMINOPHEN 1 TABLET: 5; 325 TABLET ORAL at 10:47

## 2018-06-29 RX ADMIN — PREDNISONE 60 MG: 20 TABLET ORAL at 05:58

## 2018-06-29 RX ADMIN — FUROSEMIDE 80 MG: 10 INJECTION, SOLUTION INTRAMUSCULAR; INTRAVENOUS at 05:58

## 2018-06-29 RX ADMIN — Medication 1000 MCG: at 05:57

## 2018-06-29 RX ADMIN — FUROSEMIDE 80 MG: 40 TABLET ORAL at 16:04

## 2018-06-29 RX ADMIN — HYDROCODONE BITARTRATE AND ACETAMINOPHEN 1 TABLET: 5; 325 TABLET ORAL at 16:39

## 2018-06-29 RX ADMIN — SEVELAMER CARBONATE 800 MG: 800 TABLET, FILM COATED ORAL at 12:57

## 2018-06-29 RX ADMIN — HYDROXYCHLOROQUINE SULFATE 200 MG: 200 TABLET, FILM COATED ORAL at 05:59

## 2018-06-29 RX ADMIN — ISOSORBIDE MONONITRATE 60 MG: 30 TABLET ORAL at 05:58

## 2018-06-29 RX ADMIN — METOLAZONE 5 MG: 2.5 TABLET ORAL at 05:58

## 2018-06-29 RX ADMIN — FUROSEMIDE 80 MG: 40 TABLET ORAL at 09:46

## 2018-06-29 ASSESSMENT — ENCOUNTER SYMPTOMS: WEAKNESS: 0

## 2018-06-29 ASSESSMENT — PAIN SCALES - GENERAL
PAINLEVEL_OUTOF10: 7
PAINLEVEL_OUTOF10: 0
PAINLEVEL_OUTOF10: 0
PAINLEVEL_OUTOF10: 7

## 2018-06-29 NOTE — PROGRESS NOTES
Patient has discharge order. Family will not be able to  patient until the afternoon. Patient requested teaching to be given when significant other is at the bedside. Will f/u when fmaily is present. Headache has improved after meds administered. Bed in low locked position, call bell within reach. Continue to monitor.

## 2018-06-29 NOTE — PROGRESS NOTES
Patient resting in bed. Does c/o headache. Will admin pain med per order. Significant other is coming to  patient at 5pm. Bed in low locked position, call bell within reach. Continue to monitor.

## 2018-06-29 NOTE — PROGRESS NOTES
Report received by dayshift RN. Assumed care of pt. Assessment complete. Family at bedside. Pt A&Ox4, VSS. Pt in no apparent signs of distress. POC discussed. Call light within reach, bed in lowest position, and pt has no further questions at this time.

## 2018-06-29 NOTE — DISCHARGE INSTRUCTIONS
Discharge Instructions    Discharged to home by car with relative. Discharged via wheelchair, hospital escort: Yes.  Special equipment needed: Not Applicable    Be sure to schedule a follow-up appointment with your primary care doctor or any specialists as instructed.     Discharge Plan:   Pneumococcal Vaccine Administered/Refused: Not given - Patient refused pneumococcal vaccine  Influenza Vaccine Indication: Patient Refuses    I understand that a diet low in cholesterol, fat, and sodium is recommended for good health. Unless I have been given specific instructions below for another diet, I accept this instruction as my diet prescription.   Other diet: cardiac/ renal    Special Instructions:   HF Patient Discharge Instructions  · Monitor your weight daily, and maintain a weight chart, to track your weight changes.   · Activity as tolerated, unless your Doctor has ordered otherwise. Other activity order: .  · Follow a low fat, low cholesterol, low salt diet unless instructed otherwise by your Doctor. Read the labels on the back of food products and track your intake of fat, cholesterol and salt.   · Fluid Restriction No. If a Fluid Restriction has been ordered by your Doctor, measure fluids with a measuring cup to ensure that you are not exceeding the restriction.   · No smoking.  · Oxygen No. If your Doctor has ordered that you wear Oxygen at home, it is important to wear it as ordered.  · Did you receive an explanation from staff on the importance of taking each of your medications and why it is necessary to keep taking them unless your doctor says to stop? Yes  · Were all of your questions answered about how to manage your heart failure and what to do if you have increased signs and symptoms after you go home? Yes  · Do you feel like your heart failure care team involved you in the care treatment plan and allowed you to make decisions regarding your care while in the hospital and addressed any discharge needs you  might have? Yes    See the educational handout provided at discharge for more information on monitoring your daily weight, activity and diet. This also explains more about Heart Failure, symptoms of a flare-up and some of the tests that you have undergone.     Warning Signs of a Flare-Up include:  · Swelling in the ankles or lower legs.  · Shortness of breath, while at rest, or while doing normal activities.   · Shortness of breath at night when in bed, or coughing in bed.   · Requiring more pillows to sleep at night, or needing to sit up at night to sleep.  · Feeling weak, dizzy or fatigued.     When to call your Doctor:  · Call [facility] seven days a week from 8:00 a.m. to 8:00 p.m. for medical questions [phone].  · Call your Primary Care Physician or Cardiologist if:   1. You experience any pain radiating to your jaw or neck.  2. You have any difficulty breathing.  3. You experience weight gain of 3 lbs in a day or 5 lbs in a week.   4. You feel any palpitations or irregular heartbeats.  5. You become dizzy or lose consciousness.   If you have had an angiogram or had a pacemaker or AICD placed, and experience:  1. Bleeding, drainage or swelling at the surgical / puncture site.  2. Fever greater than 100.0 F  3. Shock from internal defibrillator.  4. Cool and / or numb extremities.      · Is patient discharged on Warfarin / Coumadin?   No     Depression / Suicide Risk    As you are discharged from this [facility], it is important to learn how to keep safe from harming yourself.    Recognize the warning signs:  · Abrupt changes in personality, positive or negative- including increase in energy   · Giving away possessions  · Change in eating patterns- significant weight changes-  positive or negative  · Change in sleeping patterns- unable to sleep or sleeping all the time   · Unwillingness or inability to communicate  · Depression  · Unusual sadness, discouragement and  loneliness  · Talk of wanting to die  · Neglect of personal appearance   · Rebelliousness- reckless behavior  · Withdrawal from people/activities they love  · Confusion- inability to concentrate     If you or a loved one observes any of these behaviors or has concerns about self-harm, here's what you can do:  · Talk about it- your feelings and reasons for harming yourself  · Remove any means that you might use to hurt yourself (examples: pills, rope, extension cords, firearm)  · Get professional help from the community (Mental Health, Substance Abuse, psychological counseling)  · Do not be alone:Call your Safe Contact- someone whom you trust who will be there for you.  · Call your local CRISIS HOTLINE 399-2976 or 678-945-6816  · Call your local Children's Mobile Crisis Response Team [place] (809) 086-2012 or www.[employer]  · Call the toll free National Suicide Prevention Hotlines   · National Suicide Prevention Lifeline 637-170-ADNJ (6168)  · National Hope Line Network 800-SUICIDE (554-1113)

## 2018-06-29 NOTE — CARE PLAN
Problem: Safety  Goal: Will remain free from injury  Outcome: PROGRESSING AS EXPECTED  Fall precautions in place. Treaded socks on pt. Bedrails up. Bed in lowest position and locked.  Call light and phone within reach. Patient educated on importance of calling nurses before getting out of bed, verbalizes understanding.     Problem: Knowledge Deficit  Goal: Knowledge of disease process/condition, treatment plan, diagnostic tests, and medications will improve  Outcome: PROGRESSING AS EXPECTED  Patient educated about POC.  All questions answered in regards to disease process, treatment, and diet.  Patient verbalized understanding and voiced no further questions at this time.

## 2018-06-29 NOTE — PROGRESS NOTES
Report received from night RN. No issues overnight. Trending H/H. No c/o sob or cp. Vitals stable. Bed in low locked position, call bell within reach. Continue to monitor.

## 2018-06-30 NOTE — PROGRESS NOTES
Patient given discharge information. Prescriptions given. Patient verbalized understanding. IJ and tele discontinued. Patient tolerated procedure. Patient transported to [facility] via wheelchair.

## 2018-07-19 ENCOUNTER — HOSPITAL ENCOUNTER (OUTPATIENT)
Dept: RADIOLOGY | Facility: MEDICAL CENTER | Age: 28
End: 2018-07-19
Attending: STUDENT IN AN ORGANIZED HEALTH CARE EDUCATION/TRAINING PROGRAM
Payer: COMMERCIAL

## 2018-07-19 ENCOUNTER — OFFICE VISIT (OUTPATIENT)
Dept: INTERNAL MEDICINE | Facility: MEDICAL CENTER | Age: 28
End: 2018-07-19
Payer: COMMERCIAL

## 2018-07-19 VITALS
DIASTOLIC BLOOD PRESSURE: 110 MMHG | OXYGEN SATURATION: 95 % | HEART RATE: 105 BPM | SYSTOLIC BLOOD PRESSURE: 150 MMHG | WEIGHT: 140.6 LBS | HEIGHT: 65 IN | BODY MASS INDEX: 23.43 KG/M2 | TEMPERATURE: 99.8 F

## 2018-07-19 DIAGNOSIS — I31.39 PERICARDIAL EFFUSION: ICD-10-CM

## 2018-07-19 DIAGNOSIS — I10 ESSENTIAL HYPERTENSION: ICD-10-CM

## 2018-07-19 DIAGNOSIS — R50.9 FEVER, UNSPECIFIED FEVER CAUSE: ICD-10-CM

## 2018-07-19 DIAGNOSIS — M32.14 OTHER SYSTEMIC LUPUS ERYTHEMATOSUS WITH GLOMERULAR DISEASE (HCC): ICD-10-CM

## 2018-07-19 DIAGNOSIS — N18.9 CHRONIC KIDNEY DISEASE, UNSPECIFIED CKD STAGE: ICD-10-CM

## 2018-07-19 DIAGNOSIS — R51.9 FREQUENT HEADACHES: ICD-10-CM

## 2018-07-19 DIAGNOSIS — R11.10 VOMITING, INTRACTABILITY OF VOMITING NOT SPECIFIED, PRESENCE OF NAUSEA NOT SPECIFIED, UNSPECIFIED VOMITING TYPE: ICD-10-CM

## 2018-07-19 PROCEDURE — 99214 OFFICE O/P EST MOD 30 MIN: CPT | Mod: GC | Performed by: INTERNAL MEDICINE

## 2018-07-19 PROCEDURE — 70450 CT HEAD/BRAIN W/O DYE: CPT

## 2018-07-19 ASSESSMENT — ENCOUNTER SYMPTOMS
FOCAL WEAKNESS: 0
SORE THROAT: 0
VOMITING: 1
EYE DISCHARGE: 0
HEMOPTYSIS: 0
DIARRHEA: 0
BLOOD IN STOOL: 0
PHOTOPHOBIA: 0
CONSTIPATION: 0
COUGH: 0
MYALGIAS: 0
SENSORY CHANGE: 0
BRUISES/BLEEDS EASILY: 0
BLURRED VISION: 1
FLANK PAIN: 0
DIAPHORESIS: 0
CLAUDICATION: 0
FALLS: 0
PALPITATIONS: 0
SHORTNESS OF BREATH: 1
SPEECH CHANGE: 0
ABDOMINAL PAIN: 1
WHEEZING: 0
WEIGHT LOSS: 1
HEARTBURN: 0
DOUBLE VISION: 0
SINUS PAIN: 0
SPUTUM PRODUCTION: 0
HEADACHES: 1
TINGLING: 0
ORTHOPNEA: 1
DIZZINESS: 0
FEVER: 1
EYE PAIN: 0

## 2018-07-19 NOTE — PROGRESS NOTES
New Patient to Establish    Reason to establish: Hospital follow-up    CC: hospital follow up  Chief Complaint   Patient presents with   • Follow-Up     hospital follow up   Headaches    HPI:   History was obtained from the patient and a medical record review.     28 year old female with PMH of lupus (on mycophenolate, hydroxychloroquine), HTN (on ACEi) and CKD Stage III (due to lupus nephritis) hospitalized on 6/22/18 for perinephric hematoma, retroperitoneal hemorrhage following L renal biopsy, anasarca, acute on chronic kidney injury and large pericardial effusion with evidence of tamponade effect.     Hospital course summary: Started on rituximab, prednisone and hydroxychloroquine per inpatient nephrology.   Echocardiogram showed dilated cardiomyopathy with EF of 25% likely due to lupus. Managed conservatively with labetalol, Imdur, amlodipine, and ceftriaxone. Patient's condition improved without need for hemodialysis.   Patient was discharged to home in stable condition on 6/29/18.     Scheduled for repeat echocardiogram as outpatient on 7/23/18  Scheduled to follow up with her outpatient nephrologist on 8/14/18.     Reports monthly visits with her nephrologist Dr. Flor Otto prior to hospitalization. She is already in the care of a primary care physician at [facility].     She continues to have headaches and fevers since discharge, she also notes decreased appetite due to headache and weight loss (reports weight of 152 lbs prior to hospitalization, current weight is 140 lbs).  Per patient report: HA is bilateral, throbbing and band-like, occipital and frontal, has caused her to vomit and can last for hours after onset. HA is not relieved by Tylenol, has no pattern of occurrence, is not associated with migraine symptoms such as aura and photophobia.  ROS positive for change in vision, patient describes seeing flashes and squares one time and a faded line another time. She says her visual sx  "occurred in the absence of HA.   Patient endorses CP when supine, 2 pillow orthopnea, and SOB upon climbing 2 flights of stairs.  She endorses dull pelvic pain that is transient and occurs when she drives over a speed bump or stands for a prolonged period of time.   ROS negative for focal weakness, sensory deficits, soar throat, ear pain or change in hearing, numbness, tingling, flank pain, urinary sx, diarrhea, blood in stool, difficulty with speech or swallowing.  Reports discontinuing Imdur in the first week after discharge as she felt it was making her HA worse. Reports taking all other medications as prescribed.      Patient Active Problem List    Diagnosis Date Noted   • Acute on Chronic Anemia due to traumatic retroperitoneal hematoma S/P renal biopsy  06/22/2018     Priority: High   • Systemic lupus erythematosus with glomerular disease (HCC) 06/23/2018     Priority: Medium   • Hypertension 06/23/2018     Priority: Medium   • Acute renal failure secondary to lupus nephritis  06/22/2018     Priority: Medium   • Pericardial effusion 06/22/2018     Priority: Medium   • Renal anasarca 06/22/2018     Priority: Medium   • Hyperphosphatemia 06/22/2018     Priority: Low   • Anemia of chronic disease/autoimmune  06/22/2018     Priority: Low       Past Medical History:   Diagnosis Date   • Anemia    • Hypertension    • IUD (intrauterine device) in place 06/20/2018   • Lupus    • Renal disorder 06/20/2018    \"Stage 3 kidney disease\"   • Ruptured ectopic pregnancy        Current Outpatient Prescriptions   Medication Sig Dispense Refill   • furosemide (LASIX) 80 MG Tab Take 1 Tab by mouth 2 Times a Day for 30 days. 60 Tab 0   • cyanocobalamin (VITAMIN B12) 1000 MCG Tab Take 1 Tab by mouth every day for 30 days. 30 Tab 0   • mycophenolate sodium (MYFORTIC) 360 MG Tablet Delayed Response tablet Take 720 mg by mouth 2 Times a Day.     • ferrous sulfate 325 (65 Fe) MG tablet Take 325 mg by mouth every day.     • " hydroxychloroquine (PLAQUENIL) 200 MG Tab Take 200 mg by mouth 2 times a day.     • predniSONE (DELTASONE) 20 MG Tab Take 60 mg by mouth every day.     • metOLazone (ZAROXOLYN) 5 MG Tab Take 1 Tab by mouth every day for 30 days. 30 Tab 0   • isosorbide mononitrate SR (IMDUR) 60 MG TABLET SR 24 HR Take 1 Tab by mouth every day for 30 days. 30 Tab 0   • vitamin D, Ergocalciferol, (DRISDOL) 54456 units Cap capsule Take 50,000 Units by mouth every 7 days.     • hydrocodone-acetaminophen (NORCO) 5-325 MG Tab per tablet Take 1 Tab by mouth every 6 hours as needed. 30 Tab 0     No current facility-administered medications for this visit.        Allergies as of 07/19/2018 - Reviewed 07/19/2018   Allergen Reaction Noted   • Nkda [no known drug allergy]  08/29/2008       Social History     Social History   • Marital status: Single     Spouse name: N/A   • Number of children: N/A   • Years of education: N/A     Occupational History   • Not on file.     Social History Main Topics   • Smoking status: Never Smoker   • Smokeless tobacco: Never Used   • Alcohol use No   • Drug use: No   • Sexual activity: Not on file     Other Topics Concern   • Not on file     Social History Narrative   • No narrative on file       Family History   Problem Relation Age of Onset   • Arthritis Mother        Past Surgical History:   Procedure Laterality Date   • PELVISCOPY  8/30/08    Performed by JEREMIAH MENJIVAR at SURGERY [facility] ORS   • LYSIS ADHESIONS GYN  8/30/08    Performed by JEREMIAH MENJIVAR at SURGERY [facility] ORS       Review of Systems   Constitutional: Positive for fever, malaise/fatigue and weight loss. Negative for diaphoresis.   HENT: Negative for congestion, ear pain, hearing loss, nosebleeds, sinus pain, sore throat and tinnitus.    Eyes: Positive for blurred vision. Negative for double vision, photophobia, pain and discharge.   Respiratory: Positive for shortness of breath. Negative for cough, hemoptysis, sputum production  "and wheezing.    Cardiovascular: Positive for chest pain and orthopnea. Negative for palpitations, claudication and leg swelling.   Gastrointestinal: Positive for abdominal pain and vomiting. Negative for blood in stool, constipation, diarrhea, heartburn and melena.   Genitourinary: Positive for frequency. Negative for dysuria, flank pain, hematuria and urgency.   Musculoskeletal: Negative for falls and myalgias.   Skin: Negative for itching and rash.   Neurological: Positive for headaches. Negative for dizziness, tingling, sensory change, speech change and focal weakness.   Endo/Heme/Allergies: Does not bruise/bleed easily.       /110   Pulse (!) 105   Temp 37.7 °C (99.8 °F)   Ht 1.651 m (5' 5\")   Wt 63.8 kg (140 lb 9.6 oz)   SpO2 95%   BMI 23.40 kg/m²     Physical Exam   Constitutional: She is oriented to person, place, and time. No distress.   HENT:   Head: Normocephalic and atraumatic.   Right Ear: External ear normal.   Left Ear: External ear normal.   Mouth/Throat: Oropharynx is clear and moist.   Eyes: Conjunctivae and EOM are normal. Pupils are equal, round, and reactive to light. Right eye exhibits no discharge. Left eye exhibits no discharge. No scleral icterus.   Neck: Neck supple. No thyromegaly present.   Cardiovascular: Regular rhythm.  Exam reveals no gallop and no friction rub.    Murmur heard.  Pulmonary/Chest: Effort normal and breath sounds normal. No respiratory distress. She has no wheezes. She has no rales. She exhibits no tenderness.   Abdominal: Soft. Bowel sounds are normal. She exhibits no distension and no mass. There is tenderness. There is no rebound and no guarding.   Musculoskeletal: Normal range of motion. She exhibits deformity. She exhibits no edema or tenderness.   Lymphadenopathy:     She has no cervical adenopathy.   Neurological: She is alert and oriented to person, place, and time. No cranial nerve deficit. Gait normal. Coordination normal.   Skin: Skin is warm and " dry. No rash noted. She is not diaphoretic. No erythema. No pallor.       Note: I have reviewed all pertinent labs and diagnostic tests associated with this visit with specific comments listed under the assessment and plan below    Immunization History   Administered Date(s) Administered   • HPV Quadrivalent Vaccine (GARDASIL) 01/07/2008   • Hepatitis A Vaccine, Ped/Adol 06/27/2005, 01/07/2008   • Hepatitis B Vaccine Non-Recombivax (Ped/Adol) 04/02/1998, 08/27/1998, 03/01/1999   • IPV 06/27/2005   • Influenza Vaccine Pediatric Split 01/07/2008   • MMR Vaccine 04/02/1998, 08/27/1998   • Meningococcal Conjugate Vaccine MCV4 (Menactra) 01/07/2008   • OPV - Historical Data 04/02/1998, 08/27/1998, 03/01/1999   • TD Vaccine 04/02/1998, 08/27/1998, 03/01/1999   • Tdap Vaccine 01/07/2008, 10/10/2013       Assessment and Plan  Mendy was seen today for follow-up. Considering the patient's history of lupus and immunosuppressive therapy, her headache, (causing emesis), change in vision and fevers coupled with /100 and tachycardia are most concerning for stroke vs. CNS infection vs. CNS vasculitis versus HTNive urgency. Migraine is less likely in the absence of migraine prodrome and aura. Tension HA is less likely given that her headaches are severe, have caused her to vomit, can last several hours, and are not relived by Tylenol.    We explained our concerns to the patient and strongly advised the patient to go to the ED for further evaluation to rule out a potentially serious, life-threatening etiology   The patient declined to go to the ED after expressing her understanding of our concerns and the risks of declining our recommendation.   She wants to try to start w/u as outpatient.   ER precautions discussed.  Told her to go to ER with worsening HAs, recurrent emesis, vision changes, cont'd fevers or she othewise feels unwell    1. Frequent headaches  CT-HEAD W/O    CBC WITH DIFFERENTIAL    COMP METABOLIC PANEL     WESTERGREN SED RATE    C-REACTIVE PROTEIN CARDIAC   2. Vomiting, intractability of vomiting not specified, presence of nausea not specified, unspecified vomiting type  CT-HEAD W/O    CBC WITH DIFFERENTIAL    COMP METABOLIC PANEL   3. Essential hypertension  CT-HEAD W/O    CBC WITH DIFFERENTIAL    COMP METABOLIC PANEL   4. Chronic kidney disease, unspecified CKD stage  COMP METABOLIC PANEL   5. Pericardial effusion     6. Other systemic lupus erythematosus with glomerular disease (HCC)  CT-HEAD W/O    CBC WITH DIFFERENTIAL    COMP METABOLIC PANEL    WESTERGREN SED RATE    C-REACTIVE PROTEIN CARDIAC   7. Fever, unspecified fever cause  CT-HEAD W/O    CBC WITH DIFFERENTIAL    COMP METABOLIC PANEL    WESTERGREN SED RATE    C-REACTIVE PROTEIN CARDIAC       HTN, pericardial effusion  - Cardiology follow-up as scheduled    Systemic lupus erythematosus with glomerular disease, acute renal failure with tubular necrosis  - CBC, CMP   - Nephrology follow-up as scheduled  - Noncontrast CT Head today  - ESR, CRP, CMP, CBC      Followup: Return if symptoms worsen or fail to improve, for Short.      Signed by: Charissa Valencia M.D.

## 2018-07-19 NOTE — PATIENT INSTRUCTIONS
Systemic Lupus Erythematosus, Adult  Systemic lupus erythematosus is a long-term (chronic) disease that can affect many parts of the body. It can damage the skin, joints, blood vessels, brain, kidneys, lungs, heart, and other internal organs. It causes pain, irritation, and inflammation.  Systemic lupus erythematosus is an autoimmune disease. With this type of disease, the body’s defense system (immune system) mistakenly attacks normal tissues instead of attacking germs or abnormal growths.  What are the causes?  The cause of this condition is not known.  What increases the risk?  This condition is more likely to develop in:  · Females.  · People of  descent.  · People of -American descent.  · People who have a family history of the condition.  What are the signs or symptoms?  General symptoms include:  · Joint pain and swelling (common).  · Fever.  · Fatigue.  · Unusual weight loss or weight gain.  · Skin rashes, especially over the nose and cheeks (butterfly rash) and after sun exposure.  · Sores inside the mouth or nose.  Other symptoms depend on which parts of the body are affected. They can include:  · Shortness of breath.  · Chest pain.  · Frequent urination.  · Blood in the urine.  · Seizures.  · Mental changes.  · Hair loss.  · Swollen and tender lymph nodes.  · Swelling of the hands or feet.  Symptoms can come and go. A period of time when symptoms get worse or come back is called a flare. A period of time with no symptoms is called a remission.  How is this diagnosed?  This condition is diagnosed based on symptoms, a medical history, and a physical exam. You may also have tests, including:  · Blood tests.  · Urine tests.  · A chest X-ray.  · A skin or kidney biopsy. For this test, a sample of tissue is taken from the skin or kidney and studied under a microscope.  You may be referred to an autoimmune disease specialist (rheumatologist).  How is this treated?  There is no cure for this  condition, but treatment can keep the disease in remission, help to control symptoms, and prevent damage to the heart, lungs, kidneys, and other organs. Treatment may involve taking a combination of medicines over time.  Follow these instructions at home:  Medicines  · Take medicines only as directed by your health care provider.  · Do not take any medicines that contain estrogen without first checking with your health care provider. Estrogen can trigger flares and may increase your risk for blood clots.  Lifestyle  · Eat a heart-healthy diet.  · Stay active as directed by your health care provider.  · Do not smoke. If you need help quitting, ask your health care provider.  · Protect your skin from the sun by applying sunblock and wearing protective hats and clothing.  · Learn as much as you can about your condition and have a good support system in place. Support may come from family, friends, or a lupus support group.  General instructions  · Keep all follow-up visits as directed by your health care provider. This is important.  · Work closely with all of your health care providers to manage your condition.  · Let your health care provider know right away if you become pregnant or if you plan to become pregnant. Pregnancy in women with this condition is considered high risk.  Contact a health care provider if:  · You have a fever.  · Your symptoms flare.  · You develop new symptoms.  · You develop swollen feet or hands.  · You develop puffiness around your eyes.  · Your medicines are not working.  · You have bloody, foamy, or coffee-colored urine.  · There are changes in your urination. For example, you urinate more often at night.  · You think that you may be depressed or have anxiety.  Get help right away if:  · You have chest pain.  · You have trouble breathing.  · You have a seizure.  · You suddenly get a very bad headache.  · You suddenly develop facial or body weakness.  · You cannot speak.  · You cannot  understand speech.  This information is not intended to replace advice given to you by your health care provider. Make sure you discuss any questions you have with your health care provider.  Document Released: 12/08/2003 Document Revised: 08/13/2017 Document Reviewed: 11/25/2015  [employer] Interactive Patient Education © 2017 [employer]

## 2018-07-20 ENCOUNTER — TELEPHONE (OUTPATIENT)
Dept: INTERNAL MEDICINE | Facility: MEDICAL CENTER | Age: 28
End: 2018-07-20

## 2018-07-20 NOTE — TELEPHONE ENCOUNTER
Called patient about the results of her Non-contrast CT Head from 7/19/18.   I reiterated to the patient our recommendation of going to ED for further evaluation of her Has and ongoing fevers. The patient declined this recommendation.   I followed up on the labs ordered for patient yesterday, the patient states that she will complete her lab today.  I advised the patient to set up an urgent appointment with her usual PCP, the patient agreed to do so.

## 2018-07-24 LAB
ALBUMIN SERPL-MCNC: 2.9 G/DL (ref 3.5–5.5)
ALBUMIN/GLOB SERPL: 0.7 {RATIO} (ref 1.2–2.2)
ALP SERPL-CCNC: 43 IU/L (ref 39–117)
ALT SERPL-CCNC: 6 IU/L (ref 0–32)
AST SERPL-CCNC: 10 IU/L (ref 0–40)
BASOPHILS # BLD AUTO: 0 X10E3/UL (ref 0–0.2)
BASOPHILS NFR BLD AUTO: 1 %
BILIRUB SERPL-MCNC: 0.2 MG/DL (ref 0–1.2)
BUN SERPL-MCNC: 46 MG/DL (ref 6–20)
BUN/CREAT SERPL: 12 (ref 9–23)
CALCIUM SERPL-MCNC: 8.4 MG/DL (ref 8.7–10.2)
CHLORIDE SERPL-SCNC: 104 MMOL/L (ref 96–106)
CO2 SERPL-SCNC: 17 MMOL/L (ref 20–29)
CREAT SERPL-MCNC: 3.98 MG/DL (ref 0.57–1)
EOSINOPHIL # BLD AUTO: 0 X10E3/UL (ref 0–0.4)
EOSINOPHIL NFR BLD AUTO: 0 %
ERYTHROCYTE [DISTWIDTH] IN BLOOD BY AUTOMATED COUNT: 14.6 % (ref 12.3–15.4)
ERYTHROCYTE [SEDIMENTATION RATE] IN BLOOD BY WESTERGREN METHOD: 57 MM/HR (ref 0–32)
GLOBULIN SER CALC-MCNC: 4 G/DL (ref 1.5–4.5)
GLUCOSE SERPL-MCNC: 98 MG/DL (ref 65–99)
HCT VFR BLD AUTO: 26.7 % (ref 34–46.6)
HGB BLD-MCNC: 8.7 G/DL (ref 11.1–15.9)
IF AFRICAN AMERICAN  100797: 17 ML/MIN/1.73
IF NON AFRICAN AMER 100791: 14 ML/MIN/1.73
IMM GRANULOCYTES # BLD: 0 X10E3/UL (ref 0–0.1)
IMM GRANULOCYTES NFR BLD: 0 %
IMMATURE CELLS  115398: ABNORMAL
LYMPHOCYTES # BLD AUTO: 2 X10E3/UL (ref 0.7–3.1)
LYMPHOCYTES NFR BLD AUTO: 34 %
MCH RBC QN AUTO: 28.8 PG (ref 26.6–33)
MCHC RBC AUTO-ENTMCNC: 32.6 G/DL (ref 31.5–35.7)
MCV RBC AUTO: 88 FL (ref 79–97)
MONOCYTES # BLD AUTO: 0.5 X10E3/UL (ref 0.1–0.9)
MONOCYTES NFR BLD AUTO: 9 %
MORPHOLOGY BLD-IMP: ABNORMAL
NEUTROPHILS # BLD AUTO: 3.3 X10E3/UL (ref 1.4–7)
NEUTROPHILS NFR BLD AUTO: 56 %
NRBC BLD AUTO-RTO: ABNORMAL %
PLATELET # BLD AUTO: 285 X10E3/UL (ref 150–379)
POTASSIUM SERPL-SCNC: 4.4 MMOL/L (ref 3.5–5.2)
PROT SERPL-MCNC: 6.9 G/DL (ref 6–8.5)
RBC # BLD AUTO: 3.02 X10E6/UL (ref 3.77–5.28)
SODIUM SERPL-SCNC: 138 MMOL/L (ref 134–144)
WBC # BLD AUTO: 5.9 X10E3/UL (ref 3.4–10.8)

## 2018-10-16 ENCOUNTER — HOSPITAL ENCOUNTER (OUTPATIENT)
Dept: RADIOLOGY | Facility: MEDICAL CENTER | Age: 28
End: 2018-10-16
Attending: INTERNAL MEDICINE
Payer: COMMERCIAL

## 2018-10-16 DIAGNOSIS — R51.9 INTRACTABLE HEADACHE, UNSPECIFIED CHRONICITY PATTERN, UNSPECIFIED HEADACHE TYPE: ICD-10-CM

## 2018-10-16 PROCEDURE — 70450 CT HEAD/BRAIN W/O DYE: CPT

## 2018-12-07 ENCOUNTER — HOSPITAL ENCOUNTER (EMERGENCY)
Dept: HOSPITAL 8 - ED | Age: 28
Discharge: HOME | End: 2018-12-07
Payer: COMMERCIAL

## 2018-12-07 VITALS — SYSTOLIC BLOOD PRESSURE: 164 MMHG | DIASTOLIC BLOOD PRESSURE: 93 MMHG

## 2018-12-07 VITALS — BODY MASS INDEX: 23.99 KG/M2 | HEIGHT: 65 IN | WEIGHT: 143.96 LBS

## 2018-12-07 DIAGNOSIS — I50.9: ICD-10-CM

## 2018-12-07 DIAGNOSIS — M79.662: Primary | ICD-10-CM

## 2018-12-07 DIAGNOSIS — I11.0: ICD-10-CM

## 2018-12-07 LAB
ALBUMIN SERPL-MCNC: 3 G/DL (ref 3.4–5)
ALP SERPL-CCNC: 59 U/L (ref 45–117)
ALT SERPL-CCNC: 15 U/L (ref 12–78)
ANION GAP SERPL CALC-SCNC: 9 MMOL/L (ref 5–15)
BASOPHILS # BLD AUTO: 0.12 X10^3/UL (ref 0–0.1)
BASOPHILS NFR BLD AUTO: 1 % (ref 0–1)
BILIRUB SERPL-MCNC: 0.2 MG/DL (ref 0.2–1)
CALCIUM SERPL-MCNC: 8.9 MG/DL (ref 8.5–10.1)
CHLORIDE SERPL-SCNC: 114 MMOL/L (ref 98–107)
CREAT SERPL-MCNC: 2.28 MG/DL (ref 0.55–1.02)
CULTURE INDICATED?: YES
EOSINOPHIL # BLD AUTO: 0.11 X10^3/UL (ref 0–0.4)
EOSINOPHIL NFR BLD AUTO: 1 % (ref 1–7)
ERYTHROCYTE [DISTWIDTH] IN BLOOD BY AUTOMATED COUNT: 14 % (ref 9.6–15.2)
ERYTHROCYTE [SEDIMENTATION RATE] IN BLOOD BY WESTERGREN METHOD: 60 MM/HR (ref 0–20)
HCT (SEDRATE): 34.6 % (ref 34.6–47.8)
LYMPHOCYTES # BLD AUTO: 1.3 X10^3/UL (ref 1–3.4)
LYMPHOCYTES NFR BLD AUTO: 13 % (ref 22–44)
MCH RBC QN AUTO: 31.1 PG (ref 27–34.8)
MCHC RBC AUTO-ENTMCNC: 34 G/DL (ref 32.4–35.8)
MCV RBC AUTO: 91.4 FL (ref 80–100)
MD: NO
MICROSCOPIC: (no result)
MONOCYTES # BLD AUTO: 0.7 X10^3/UL (ref 0.2–0.8)
MONOCYTES NFR BLD AUTO: 7 % (ref 2–9)
NEUTROPHILS # BLD AUTO: 8 X10^3/UL (ref 1.8–6.8)
NEUTROPHILS NFR BLD AUTO: 78 % (ref 42–75)
PLATELET # BLD AUTO: 334 X10^3/UL (ref 130–400)
PMV BLD AUTO: 8.1 FL (ref 7.4–10.4)
PROT SERPL-MCNC: 6.8 G/DL (ref 6.4–8.2)
RBC # BLD AUTO: 3.78 X10^6/UL (ref 3.82–5.3)

## 2018-12-07 PROCEDURE — 87086 URINE CULTURE/COLONY COUNT: CPT

## 2018-12-07 PROCEDURE — 36415 COLL VENOUS BLD VENIPUNCTURE: CPT

## 2018-12-07 PROCEDURE — 87040 BLOOD CULTURE FOR BACTERIA: CPT

## 2018-12-07 PROCEDURE — 99284 EMERGENCY DEPT VISIT MOD MDM: CPT

## 2018-12-07 PROCEDURE — 85025 COMPLETE CBC W/AUTO DIFF WBC: CPT

## 2018-12-07 PROCEDURE — 76857 US EXAM PELVIC LIMITED: CPT

## 2018-12-07 PROCEDURE — 85651 RBC SED RATE NONAUTOMATED: CPT

## 2018-12-07 PROCEDURE — 81001 URINALYSIS AUTO W/SCOPE: CPT

## 2018-12-07 PROCEDURE — 80053 COMPREHEN METABOLIC PANEL: CPT

## 2019-04-10 ENCOUNTER — OUTPATIENT INFUSION SERVICES (OUTPATIENT)
Dept: ONCOLOGY | Facility: MEDICAL CENTER | Age: 29
End: 2019-04-10
Attending: INTERNAL MEDICINE
Payer: COMMERCIAL

## 2019-04-10 VITALS
RESPIRATION RATE: 18 BRPM | TEMPERATURE: 97 F | DIASTOLIC BLOOD PRESSURE: 62 MMHG | HEIGHT: 65 IN | WEIGHT: 161.82 LBS | SYSTOLIC BLOOD PRESSURE: 117 MMHG | OXYGEN SATURATION: 100 % | HEART RATE: 73 BPM | BODY MASS INDEX: 26.96 KG/M2

## 2019-04-10 DIAGNOSIS — N17.0 ACUTE RENAL FAILURE WITH TUBULAR NECROSIS (HCC): ICD-10-CM

## 2019-04-10 LAB
ALBUMIN SERPL BCP-MCNC: 3.7 G/DL (ref 3.2–4.9)
ALBUMIN/GLOB SERPL: 1.2 G/DL
ALP SERPL-CCNC: 48 U/L (ref 30–99)
ALT SERPL-CCNC: 14 U/L (ref 2–50)
ANION GAP SERPL CALC-SCNC: 9 MMOL/L (ref 0–11.9)
AST SERPL-CCNC: 15 U/L (ref 12–45)
BASOPHILS # BLD AUTO: 0.7 % (ref 0–1.8)
BASOPHILS # BLD: 0.03 K/UL (ref 0–0.12)
BILIRUB SERPL-MCNC: 0.4 MG/DL (ref 0.1–1.5)
BUN SERPL-MCNC: 33 MG/DL (ref 8–22)
CALCIUM SERPL-MCNC: 9.2 MG/DL (ref 8.5–10.5)
CHLORIDE SERPL-SCNC: 110 MMOL/L (ref 96–112)
CO2 SERPL-SCNC: 21 MMOL/L (ref 20–33)
CREAT SERPL-MCNC: 1.68 MG/DL (ref 0.5–1.4)
EOSINOPHIL # BLD AUTO: 0.08 K/UL (ref 0–0.51)
EOSINOPHIL NFR BLD: 1.9 % (ref 0–6.9)
ERYTHROCYTE [DISTWIDTH] IN BLOOD BY AUTOMATED COUNT: 40.2 FL (ref 35.9–50)
GLOBULIN SER CALC-MCNC: 3.1 G/DL (ref 1.9–3.5)
GLUCOSE SERPL-MCNC: 94 MG/DL (ref 65–99)
HCT VFR BLD AUTO: 39.3 % (ref 37–47)
HGB BLD-MCNC: 12.7 G/DL (ref 12–16)
IMM GRANULOCYTES # BLD AUTO: 0.04 K/UL (ref 0–0.11)
IMM GRANULOCYTES NFR BLD AUTO: 1 % (ref 0–0.9)
LYMPHOCYTES # BLD AUTO: 2.04 K/UL (ref 1–4.8)
LYMPHOCYTES NFR BLD: 49.6 % (ref 22–41)
MCH RBC QN AUTO: 30.5 PG (ref 27–33)
MCHC RBC AUTO-ENTMCNC: 32.3 G/DL (ref 33.6–35)
MCV RBC AUTO: 94.2 FL (ref 81.4–97.8)
MONOCYTES # BLD AUTO: 0.34 K/UL (ref 0–0.85)
MONOCYTES NFR BLD AUTO: 8.3 % (ref 0–13.4)
NEUTROPHILS # BLD AUTO: 1.58 K/UL (ref 2–7.15)
NEUTROPHILS NFR BLD: 38.5 % (ref 44–72)
NRBC # BLD AUTO: 0 K/UL
NRBC BLD-RTO: 0 /100 WBC
PLATELET # BLD AUTO: 276 K/UL (ref 164–446)
PMV BLD AUTO: 10 FL (ref 9–12.9)
POTASSIUM SERPL-SCNC: 4 MMOL/L (ref 3.6–5.5)
PROT SERPL-MCNC: 6.8 G/DL (ref 6–8.2)
RBC # BLD AUTO: 4.17 M/UL (ref 4.2–5.4)
SODIUM SERPL-SCNC: 140 MMOL/L (ref 135–145)
WBC # BLD AUTO: 4.1 K/UL (ref 4.8–10.8)

## 2019-04-10 PROCEDURE — 96413 CHEMO IV INFUSION 1 HR: CPT

## 2019-04-10 PROCEDURE — 96375 TX/PRO/DX INJ NEW DRUG ADDON: CPT

## 2019-04-10 PROCEDURE — 700102 HCHG RX REV CODE 250 W/ 637 OVERRIDE(OP): Performed by: INTERNAL MEDICINE

## 2019-04-10 PROCEDURE — 700111 HCHG RX REV CODE 636 W/ 250 OVERRIDE (IP): Performed by: INTERNAL MEDICINE

## 2019-04-10 PROCEDURE — 80053 COMPREHEN METABOLIC PANEL: CPT

## 2019-04-10 PROCEDURE — 96415 CHEMO IV INFUSION ADDL HR: CPT

## 2019-04-10 PROCEDURE — 85025 COMPLETE CBC W/AUTO DIFF WBC: CPT

## 2019-04-10 PROCEDURE — A9270 NON-COVERED ITEM OR SERVICE: HCPCS | Performed by: INTERNAL MEDICINE

## 2019-04-10 PROCEDURE — 700105 HCHG RX REV CODE 258: Performed by: INTERNAL MEDICINE

## 2019-04-10 RX ORDER — PANTOPRAZOLE SODIUM 40 MG/1
40 TABLET, DELAYED RELEASE ORAL DAILY
COMMUNITY

## 2019-04-10 RX ORDER — CARVEDILOL 12.5 MG/1
12.5 TABLET ORAL 2 TIMES DAILY WITH MEALS
COMMUNITY

## 2019-04-10 RX ORDER — ACETAMINOPHEN 325 MG/1
650 TABLET ORAL ONCE
Status: COMPLETED | OUTPATIENT
Start: 2019-04-10 | End: 2019-04-10

## 2019-04-10 RX ORDER — CLONIDINE HYDROCHLORIDE 0.2 MG/1
0.2 TABLET ORAL 2 TIMES DAILY
COMMUNITY

## 2019-04-10 RX ORDER — HYDRALAZINE HYDROCHLORIDE 50 MG/1
50 TABLET, FILM COATED ORAL 3 TIMES DAILY
COMMUNITY

## 2019-04-10 RX ADMIN — ACETAMINOPHEN 650 MG: 325 TABLET ORAL at 08:54

## 2019-04-10 RX ADMIN — DIPHENHYDRAMINE HYDROCHLORIDE 25 MG: 50 INJECTION INTRAMUSCULAR; INTRAVENOUS at 08:55

## 2019-04-10 RX ADMIN — RITUXIMAB 500 MG: 10 INJECTION, SOLUTION INTRAVENOUS at 09:17

## 2019-04-10 NOTE — PROGRESS NOTES
Chemotherapy Verification - SECONDARY RN       Height = 163.8  Weight = 73.4  BSA = 1.83m2       Medication: Rituxan  Dose: 500mg (set dose)  Calculated Dose: 500mg                             (In mg/m2, AUC, mg/kg)       I confirm that this process was performed independently.

## 2019-04-10 NOTE — PROGRESS NOTES
Chemotherapy Verification - PRIMARY RN      Height = 163.8 cm  Weight = 73.4 kg  BSA = 1.83 m2       Medication: Rituxan  Dose: 500 mg (set dose)  Calculated Dose: 500 mg                             (In mg/m2, AUC, mg/kg)       I confirm this process was performed independently with the BSA and all final chemotherapy dosing calculations congruent.  Any discrepancies of 5% or greater have been addressed with the chemotherapy pharmacist. The resolution of the discrepancy has been documented in the EPIC progress notes.

## 2019-04-24 ENCOUNTER — OUTPATIENT INFUSION SERVICES (OUTPATIENT)
Dept: ONCOLOGY | Facility: MEDICAL CENTER | Age: 29
End: 2019-04-24
Attending: INTERNAL MEDICINE
Payer: COMMERCIAL

## 2019-04-24 VITALS
HEIGHT: 65 IN | RESPIRATION RATE: 18 BRPM | WEIGHT: 162.04 LBS | SYSTOLIC BLOOD PRESSURE: 117 MMHG | BODY MASS INDEX: 27 KG/M2 | TEMPERATURE: 97.5 F | DIASTOLIC BLOOD PRESSURE: 73 MMHG | HEART RATE: 95 BPM | OXYGEN SATURATION: 100 %

## 2019-04-24 PROCEDURE — A9270 NON-COVERED ITEM OR SERVICE: HCPCS | Performed by: INTERNAL MEDICINE

## 2019-04-24 PROCEDURE — 700105 HCHG RX REV CODE 258: Performed by: INTERNAL MEDICINE

## 2019-04-24 PROCEDURE — 700111 HCHG RX REV CODE 636 W/ 250 OVERRIDE (IP): Performed by: INTERNAL MEDICINE

## 2019-04-24 PROCEDURE — 96413 CHEMO IV INFUSION 1 HR: CPT

## 2019-04-24 PROCEDURE — 96415 CHEMO IV INFUSION ADDL HR: CPT

## 2019-04-24 PROCEDURE — 700102 HCHG RX REV CODE 250 W/ 637 OVERRIDE(OP): Performed by: INTERNAL MEDICINE

## 2019-04-24 PROCEDURE — 96375 TX/PRO/DX INJ NEW DRUG ADDON: CPT

## 2019-04-24 RX ORDER — SODIUM CHLORIDE 9 MG/ML
500 INJECTION, SOLUTION INTRAVENOUS ONCE
Status: COMPLETED | OUTPATIENT
Start: 2019-04-24 | End: 2019-04-24

## 2019-04-24 RX ORDER — ACETAMINOPHEN 325 MG/1
650 TABLET ORAL ONCE
Status: COMPLETED | OUTPATIENT
Start: 2019-04-24 | End: 2019-04-24

## 2019-04-24 RX ADMIN — DIPHENHYDRAMINE HYDROCHLORIDE 25 MG: 50 INJECTION INTRAMUSCULAR; INTRAVENOUS at 11:20

## 2019-04-24 RX ADMIN — SODIUM CHLORIDE 500 ML: 9 INJECTION, SOLUTION INTRAVENOUS at 11:15

## 2019-04-24 RX ADMIN — ACETAMINOPHEN 650 MG: 325 TABLET ORAL at 11:19

## 2019-04-24 RX ADMIN — RITUXIMAB 500 MG: 10 INJECTION, SOLUTION INTRAVENOUS at 12:22

## 2019-04-24 NOTE — PROGRESS NOTES
Chemotherapy Verification - SECONDARY RN       Height = 165.1 cm  Weight = 73.5 kg  BSA = 1.835 m2       Medication: Rituxan  Dose: set dose  Calculated Dose: 500 mg                             (In mg/m2, AUC, mg/kg)       I confirm that this process was performed independently.

## 2019-04-24 NOTE — PROGRESS NOTES
Returns for Rituxan.  Some fatigue with last dose, but otherwise well tolerated.  Reports some nasal stuffiness, but no fever or cough of colored mucus or other infection sx.  Primary lupus issue is kidneys and occasional joint pain.  Premeds and Rituxan infused without issue.  DC to self care post.

## 2019-04-24 NOTE — PROGRESS NOTES
Chemotherapy Verification - SECONDARY RN       Height = 65 in  Weight = 73.5 kg  BSA = 1.84 m2       Medication: Rituxan  Dose: SET DOSE  Calculated Dose: 500 mg                             (In mg/m2, AUC, mg/kg)     I confirm that this process was performed independently.

## 2019-06-25 ENCOUNTER — HOSPITAL ENCOUNTER (OUTPATIENT)
Dept: HOSPITAL 8 - CFH | Age: 29
Discharge: HOME | End: 2019-06-25
Attending: INTERNAL MEDICINE
Payer: COMMERCIAL

## 2019-06-25 DIAGNOSIS — I37.1: Primary | ICD-10-CM

## 2019-06-25 PROCEDURE — 93306 TTE W/DOPPLER COMPLETE: CPT

## 2019-07-31 ENCOUNTER — HOSPITAL ENCOUNTER (EMERGENCY)
Dept: HOSPITAL 8 - ED | Age: 29
Discharge: HOME | End: 2019-07-31
Payer: COMMERCIAL

## 2019-07-31 VITALS — DIASTOLIC BLOOD PRESSURE: 66 MMHG | SYSTOLIC BLOOD PRESSURE: 115 MMHG

## 2019-07-31 VITALS — BODY MASS INDEX: 28.94 KG/M2 | WEIGHT: 173.72 LBS | HEIGHT: 65 IN

## 2019-07-31 DIAGNOSIS — R07.89: Primary | ICD-10-CM

## 2019-07-31 LAB
ALBUMIN SERPL-MCNC: 3.4 G/DL (ref 3.4–5)
ALP SERPL-CCNC: 82 U/L (ref 45–117)
ALT SERPL-CCNC: 24 U/L (ref 12–78)
ANION GAP SERPL CALC-SCNC: 7 MMOL/L (ref 5–15)
APTT BLD: 26 SECONDS (ref 25–31)
BASOPHILS # BLD AUTO: 0.04 X10^3/UL (ref 0–0.1)
BASOPHILS NFR BLD AUTO: 1 % (ref 0–1)
BILIRUB SERPL-MCNC: 0.2 MG/DL (ref 0.2–1)
CALCIUM SERPL-MCNC: 8.8 MG/DL (ref 8.5–10.1)
CHLORIDE SERPL-SCNC: 112 MMOL/L (ref 98–107)
CREAT SERPL-MCNC: 1.88 MG/DL (ref 0.55–1.02)
EOSINOPHIL # BLD AUTO: 0.09 X10^3/UL (ref 0–0.4)
EOSINOPHIL NFR BLD AUTO: 2 % (ref 1–7)
ERYTHROCYTE [DISTWIDTH] IN BLOOD BY AUTOMATED COUNT: 12.5 % (ref 9.6–15.2)
INR PPP: 0.96 (ref 0.93–1.1)
LYMPHOCYTES # BLD AUTO: 2.17 X10^3/UL (ref 1–3.4)
LYMPHOCYTES NFR BLD AUTO: 34 % (ref 22–44)
MCH RBC QN AUTO: 30.8 PG (ref 27–34.8)
MCHC RBC AUTO-ENTMCNC: 33.5 G/DL (ref 32.4–35.8)
MCV RBC AUTO: 92.2 FL (ref 80–100)
MD: NO
MONOCYTES # BLD AUTO: 0.65 X10^3/UL (ref 0.2–0.8)
MONOCYTES NFR BLD AUTO: 10 % (ref 2–9)
NEUTROPHILS # BLD AUTO: 3.36 X10^3/UL (ref 1.8–6.8)
NEUTROPHILS NFR BLD AUTO: 53 % (ref 42–75)
PLATELET # BLD AUTO: 271 X10^3/UL (ref 130–400)
PMV BLD AUTO: 8.5 FL (ref 7.4–10.4)
PROT SERPL-MCNC: 7.1 G/DL (ref 6.4–8.2)
PROTHROMBIN TIME: 10.1 SECONDS (ref 9.6–11.5)
RBC # BLD AUTO: 3.95 X10^6/UL (ref 3.82–5.3)
TROPONIN I SERPL-MCNC: 0.02 NG/ML (ref 0–0.04)

## 2019-07-31 PROCEDURE — 36415 COLL VENOUS BLD VENIPUNCTURE: CPT

## 2019-07-31 PROCEDURE — 93005 ELECTROCARDIOGRAM TRACING: CPT

## 2019-07-31 PROCEDURE — 85730 THROMBOPLASTIN TIME PARTIAL: CPT

## 2019-07-31 PROCEDURE — 80053 COMPREHEN METABOLIC PANEL: CPT

## 2019-07-31 PROCEDURE — 85610 PROTHROMBIN TIME: CPT

## 2019-07-31 PROCEDURE — 85025 COMPLETE CBC W/AUTO DIFF WBC: CPT

## 2019-07-31 PROCEDURE — 84484 ASSAY OF TROPONIN QUANT: CPT

## 2019-07-31 PROCEDURE — 99284 EMERGENCY DEPT VISIT MOD MDM: CPT

## 2019-07-31 PROCEDURE — 71045 X-RAY EXAM CHEST 1 VIEW: CPT

## 2019-07-31 NOTE — NUR
A&OX4, RESP EVEN & UNLABORED, SPEECH CLEAR, SKIN WNL.  PT TEARY.  STATES SHE 
EXPERIENCED UPPER CHEST PAIN W/ RT ARM NUMBNESS ABOUT 2 HRS PTA; WAS SITTING 
DOING HOMEWORK AT THAT TIME.  DENIES TAKING PAIN MED, ASA.  REPORTS RECENT 
COUGH.  CARDIAC & VS MONITORING EQUIPMENT APPLIED.  DR DIANE NOW AT  FOR 
EXAM. 

-------------------------------------------------------------------------------

Addendum: 07/31/19 at 2145 by CHASE

-------------------------------------------------------------------------------

LMP: "I DON'T GET THEM, I HAVE AN IUD"

## 2019-07-31 NOTE — NUR
Assumed care for discharge home. Pt is alert, vitals stable, denies pain, 
exitcare reviewed and pt verbalizes understanding. Ambulates w/ steady gait to 
discharge desk.

## 2019-07-31 NOTE — NUR
SITTING QUIETLY ON BED, RESP EVEN & UNLABORED, NO COUGH NOTED, CARDIAC & VS 
MONITORING CONTINUING (NSR), SIDE RAIL UP X1, CALL LIGHT W/IN REACH.

## 2019-08-08 ENCOUNTER — HOSPITAL ENCOUNTER (OUTPATIENT)
Dept: HOSPITAL 8 - LAB | Age: 29
Discharge: HOME | End: 2019-08-08
Attending: INTERNAL MEDICINE
Payer: COMMERCIAL

## 2019-08-08 DIAGNOSIS — N18.9: ICD-10-CM

## 2019-08-08 DIAGNOSIS — M25.561: ICD-10-CM

## 2019-08-08 DIAGNOSIS — Z79.899: ICD-10-CM

## 2019-08-08 DIAGNOSIS — M32.9: ICD-10-CM

## 2019-08-08 DIAGNOSIS — I13.0: ICD-10-CM

## 2019-08-08 DIAGNOSIS — Z11.1: Primary | ICD-10-CM

## 2019-08-08 DIAGNOSIS — I50.9: ICD-10-CM

## 2019-08-08 DIAGNOSIS — R53.83: ICD-10-CM

## 2019-08-08 PROCEDURE — 86160 COMPLEMENT ANTIGEN: CPT

## 2019-08-08 PROCEDURE — 84466 ASSAY OF TRANSFERRIN: CPT

## 2019-08-08 PROCEDURE — 83880 ASSAY OF NATRIURETIC PEPTIDE: CPT

## 2019-08-08 PROCEDURE — 86480 TB TEST CELL IMMUN MEASURE: CPT

## 2019-08-08 PROCEDURE — 36415 COLL VENOUS BLD VENIPUNCTURE: CPT

## 2019-08-08 PROCEDURE — 80053 COMPREHEN METABOLIC PANEL: CPT

## 2019-08-08 PROCEDURE — 83550 IRON BINDING TEST: CPT

## 2019-08-08 PROCEDURE — 86225 DNA ANTIBODY NATIVE: CPT

## 2019-08-08 PROCEDURE — 81001 URINALYSIS AUTO W/SCOPE: CPT

## 2019-08-08 PROCEDURE — 84425 ASSAY OF VITAMIN B-1: CPT

## 2019-08-08 PROCEDURE — 80061 LIPID PANEL: CPT

## 2019-08-08 PROCEDURE — 82306 VITAMIN D 25 HYDROXY: CPT

## 2019-08-08 PROCEDURE — 86430 RHEUMATOID FACTOR TEST QUAL: CPT

## 2019-08-08 PROCEDURE — 82746 ASSAY OF FOLIC ACID SERUM: CPT

## 2019-08-08 PROCEDURE — 84481 FREE ASSAY (FT-3): CPT

## 2019-08-08 PROCEDURE — 83036 HEMOGLOBIN GLYCOSYLATED A1C: CPT

## 2019-08-08 PROCEDURE — 82607 VITAMIN B-12: CPT

## 2019-08-08 PROCEDURE — 84443 ASSAY THYROID STIM HORMONE: CPT

## 2019-08-08 PROCEDURE — 86376 MICROSOMAL ANTIBODY EACH: CPT

## 2019-08-08 PROCEDURE — 85025 COMPLETE CBC W/AUTO DIFF WBC: CPT

## 2019-08-08 PROCEDURE — 84207 ASSAY OF VITAMIN B-6: CPT

## 2019-08-08 PROCEDURE — 82570 ASSAY OF URINE CREATININE: CPT

## 2019-08-08 PROCEDURE — 84439 ASSAY OF FREE THYROXINE: CPT

## 2019-08-08 PROCEDURE — 84156 ASSAY OF PROTEIN URINE: CPT

## 2019-08-08 PROCEDURE — 83540 ASSAY OF IRON: CPT

## 2019-08-08 PROCEDURE — 86200 CCP ANTIBODY: CPT

## 2019-08-08 PROCEDURE — 86800 THYROGLOBULIN ANTIBODY: CPT

## 2019-08-08 PROCEDURE — 82728 ASSAY OF FERRITIN: CPT

## 2019-08-14 ENCOUNTER — HOSPITAL ENCOUNTER (OUTPATIENT)
Dept: HOSPITAL 8 - CFH | Age: 29
Discharge: HOME | End: 2019-08-14
Attending: NURSE PRACTITIONER
Payer: COMMERCIAL

## 2019-08-14 DIAGNOSIS — M25.561: Primary | ICD-10-CM

## 2019-08-14 DIAGNOSIS — I50.9: ICD-10-CM

## 2019-08-14 DIAGNOSIS — Z79.899: ICD-10-CM

## 2019-08-14 DIAGNOSIS — N18.9: ICD-10-CM

## 2019-08-14 DIAGNOSIS — M25.562: ICD-10-CM

## 2019-08-14 DIAGNOSIS — I13.0: ICD-10-CM

## 2019-10-24 ENCOUNTER — HOSPITAL ENCOUNTER (OUTPATIENT)
Dept: HOSPITAL 8 - LAB | Age: 29
Discharge: HOME | End: 2019-10-24
Attending: INTERNAL MEDICINE
Payer: COMMERCIAL

## 2019-10-24 DIAGNOSIS — N18.3: ICD-10-CM

## 2019-10-24 DIAGNOSIS — R80.9: ICD-10-CM

## 2019-10-24 DIAGNOSIS — D63.1: ICD-10-CM

## 2019-10-24 DIAGNOSIS — M32.14: ICD-10-CM

## 2019-10-24 DIAGNOSIS — M32.9: Primary | ICD-10-CM

## 2019-10-24 LAB
ALBUMIN SERPL-MCNC: 3.4 G/DL (ref 3.4–5)
ALP SERPL-CCNC: 109 U/L (ref 45–117)
ALT SERPL-CCNC: 16 U/L (ref 12–78)
ANION GAP SERPL CALC-SCNC: 5 MMOL/L (ref 5–15)
BASOPHILS # BLD AUTO: 0.03 X10^3/UL (ref 0–0.1)
BASOPHILS NFR BLD AUTO: 1 % (ref 0–1)
BILIRUB SERPL-MCNC: 0.3 MG/DL (ref 0.2–1)
CALCIUM SERPL-MCNC: 8.6 MG/DL (ref 8.5–10.1)
CHLORIDE SERPL-SCNC: 112 MMOL/L (ref 98–107)
CREAT SERPL-MCNC: 1.91 MG/DL (ref 0.55–1.02)
EOSINOPHIL # BLD AUTO: 0.1 X10^3/UL (ref 0–0.4)
EOSINOPHIL NFR BLD AUTO: 2 % (ref 1–7)
ERYTHROCYTE [DISTWIDTH] IN BLOOD BY AUTOMATED COUNT: 13.3 % (ref 9.6–15.2)
HBV CORE IGM SERPL QL IA: 199 MG/DL (ref 0–12)
HBV SURFACE AB SER RIA-ACNC: 101 MG/DL
LYMPHOCYTES # BLD AUTO: 1.4 X10^3/UL (ref 1–3.4)
LYMPHOCYTES NFR BLD AUTO: 32 % (ref 22–44)
MCH RBC QN AUTO: 30.2 PG (ref 27–34.8)
MCHC RBC AUTO-ENTMCNC: 32.6 G/DL (ref 32.4–35.8)
MCV RBC AUTO: 92.7 FL (ref 80–100)
MD: NO
MICROSCOPIC: (no result)
MONOCYTES # BLD AUTO: 0.41 X10^3/UL (ref 0.2–0.8)
MONOCYTES NFR BLD AUTO: 9 % (ref 2–9)
NEUTROPHILS # BLD AUTO: 2.46 X10^3/UL (ref 1.8–6.8)
NEUTROPHILS NFR BLD AUTO: 56 % (ref 42–75)
PLATELET # BLD AUTO: 291 X10^3/UL (ref 130–400)
PMV BLD AUTO: 7.9 FL (ref 7.4–10.4)
PROT SERPL-MCNC: 7.5 G/DL (ref 6.4–8.2)
RBC # BLD AUTO: 4.14 X10^6/UL (ref 3.82–5.3)

## 2019-10-24 PROCEDURE — 84156 ASSAY OF PROTEIN URINE: CPT

## 2019-10-24 PROCEDURE — 81001 URINALYSIS AUTO W/SCOPE: CPT

## 2019-10-24 PROCEDURE — 86357 NK CELLS TOTAL COUNT: CPT

## 2019-10-24 PROCEDURE — 86355 B CELLS TOTAL COUNT: CPT

## 2019-10-24 PROCEDURE — 86160 COMPLEMENT ANTIGEN: CPT

## 2019-10-24 PROCEDURE — 36415 COLL VENOUS BLD VENIPUNCTURE: CPT

## 2019-10-24 PROCEDURE — 86225 DNA ANTIBODY NATIVE: CPT

## 2019-10-24 PROCEDURE — 80053 COMPREHEN METABOLIC PANEL: CPT

## 2019-10-24 PROCEDURE — 85025 COMPLETE CBC W/AUTO DIFF WBC: CPT

## 2019-10-24 PROCEDURE — 82570 ASSAY OF URINE CREATININE: CPT

## 2019-10-24 PROCEDURE — 86162 COMPLEMENT TOTAL (CH50): CPT

## 2019-11-20 ENCOUNTER — HOSPITAL ENCOUNTER (OUTPATIENT)
Dept: HOSPITAL 8 - LAB | Age: 29
Discharge: HOME | End: 2019-11-20
Attending: NURSE PRACTITIONER
Payer: COMMERCIAL

## 2019-11-20 DIAGNOSIS — E55.9: ICD-10-CM

## 2019-11-20 DIAGNOSIS — M25.561: ICD-10-CM

## 2019-11-20 DIAGNOSIS — N18.9: ICD-10-CM

## 2019-11-20 DIAGNOSIS — M32.9: ICD-10-CM

## 2019-11-20 DIAGNOSIS — R53.83: ICD-10-CM

## 2019-11-20 DIAGNOSIS — I13.0: Primary | ICD-10-CM

## 2019-11-20 DIAGNOSIS — Z79.899: ICD-10-CM

## 2019-11-20 DIAGNOSIS — I50.9: ICD-10-CM

## 2019-11-20 DIAGNOSIS — E03.9: ICD-10-CM

## 2019-11-20 LAB
ALBUMIN SERPL-MCNC: 3.3 G/DL (ref 3.4–5)
ALP SERPL-CCNC: 94 U/L (ref 45–117)
ALT SERPL-CCNC: 23 U/L (ref 12–78)
ANION GAP SERPL CALC-SCNC: 5 MMOL/L (ref 5–15)
BILIRUB SERPL-MCNC: 0.3 MG/DL (ref 0.2–1)
CALCIUM SERPL-MCNC: 8.7 MG/DL (ref 8.5–10.1)
CHLORIDE SERPL-SCNC: 113 MMOL/L (ref 98–107)
CHOL/HDL RATIO: 5.2
CREAT SERPL-MCNC: 1.56 MG/DL (ref 0.55–1.02)
HDL CHOL %: 19 % (ref 28–40)
HDL CHOLESTEROL (DIRECT): 41 MG/DL (ref 40–60)
LDL CHOLESTEROL,CALCULATED: 132 MG/DL (ref 54–169)
LDLC/HDLC SERPL: 3.2 {RATIO} (ref 0.5–3)
PROT SERPL-MCNC: 7.1 G/DL (ref 6.4–8.2)
T4 FREE SERPL-MCNC: 1.08 NG/DL (ref 0.76–1.46)
TRIGL SERPL-MCNC: 204 MG/DL (ref 50–200)
VLDLC SERPL CALC-MCNC: 41 MG/DL (ref 0–25)

## 2019-11-20 PROCEDURE — 83970 ASSAY OF PARATHORMONE: CPT

## 2019-11-20 PROCEDURE — 84481 FREE ASSAY (FT-3): CPT

## 2019-11-20 PROCEDURE — 82306 VITAMIN D 25 HYDROXY: CPT

## 2019-11-20 PROCEDURE — 84439 ASSAY OF FREE THYROXINE: CPT

## 2019-11-20 PROCEDURE — 80053 COMPREHEN METABOLIC PANEL: CPT

## 2019-11-20 PROCEDURE — 84100 ASSAY OF PHOSPHORUS: CPT

## 2019-11-20 PROCEDURE — 80061 LIPID PANEL: CPT

## 2019-11-20 PROCEDURE — 36415 COLL VENOUS BLD VENIPUNCTURE: CPT

## 2019-11-20 PROCEDURE — 84443 ASSAY THYROID STIM HORMONE: CPT

## 2019-11-20 PROCEDURE — 82043 UR ALBUMIN QUANTITATIVE: CPT

## 2020-01-14 ENCOUNTER — HOSPITAL ENCOUNTER (OUTPATIENT)
Dept: HOSPITAL 8 - RAD | Age: 30
Discharge: HOME | End: 2020-01-14
Attending: NURSE PRACTITIONER
Payer: COMMERCIAL

## 2020-01-14 DIAGNOSIS — I50.9: ICD-10-CM

## 2020-01-14 DIAGNOSIS — I13.0: ICD-10-CM

## 2020-01-14 DIAGNOSIS — E03.9: Primary | ICD-10-CM

## 2020-01-14 DIAGNOSIS — E55.9: ICD-10-CM

## 2020-01-14 DIAGNOSIS — N18.9: ICD-10-CM

## 2020-01-14 PROCEDURE — A9500 TC99M SESTAMIBI: HCPCS

## 2020-01-14 PROCEDURE — 78070 PARATHYROID PLANAR IMAGING: CPT

## 2020-01-16 ENCOUNTER — HOSPITAL ENCOUNTER (OUTPATIENT)
Dept: HOSPITAL 8 - LAB | Age: 30
Discharge: HOME | End: 2020-01-16
Attending: INTERNAL MEDICINE
Payer: COMMERCIAL

## 2020-01-16 DIAGNOSIS — M32.14: ICD-10-CM

## 2020-01-16 DIAGNOSIS — N18.3: Primary | ICD-10-CM

## 2020-01-16 DIAGNOSIS — R80.9: ICD-10-CM

## 2020-01-16 DIAGNOSIS — D63.1: ICD-10-CM

## 2020-01-16 DIAGNOSIS — M32.9: ICD-10-CM

## 2020-01-16 LAB
ALBUMIN SERPL-MCNC: 3.1 G/DL (ref 3.4–5)
ALP SERPL-CCNC: 99 U/L (ref 45–117)
ALT SERPL-CCNC: 18 U/L (ref 12–78)
ANION GAP SERPL CALC-SCNC: 8 MMOL/L (ref 5–15)
BASOPHILS # BLD AUTO: 0.04 X10^3/UL (ref 0–0.1)
BASOPHILS NFR BLD AUTO: 1 % (ref 0–1)
BILIRUB SERPL-MCNC: 0.3 MG/DL (ref 0.2–1)
CALCIUM SERPL-MCNC: 8.3 MG/DL (ref 8.5–10.1)
CHLORIDE SERPL-SCNC: 110 MMOL/L (ref 98–107)
CREAT SERPL-MCNC: 1.96 MG/DL (ref 0.55–1.02)
EOSINOPHIL # BLD AUTO: 0.15 X10^3/UL (ref 0–0.4)
EOSINOPHIL NFR BLD AUTO: 2 % (ref 1–7)
ERYTHROCYTE [DISTWIDTH] IN BLOOD BY AUTOMATED COUNT: 12.9 % (ref 9.6–15.2)
LYMPHOCYTES # BLD AUTO: 2.62 X10^3/UL (ref 1–3.4)
LYMPHOCYTES NFR BLD AUTO: 31 % (ref 22–44)
MCH RBC QN AUTO: 30.8 PG (ref 27–34.8)
MCHC RBC AUTO-ENTMCNC: 33.3 G/DL (ref 32.4–35.8)
MCV RBC AUTO: 92.6 FL (ref 80–100)
MD: NO
MICROSCOPIC: (no result)
MONOCYTES # BLD AUTO: 0.49 X10^3/UL (ref 0.2–0.8)
MONOCYTES NFR BLD AUTO: 6 % (ref 2–9)
NEUTROPHILS # BLD AUTO: 5.09 X10^3/UL (ref 1.8–6.8)
NEUTROPHILS NFR BLD AUTO: 61 % (ref 42–75)
PLATELET # BLD AUTO: 323 X10^3/UL (ref 130–400)
PMV BLD AUTO: 8.1 FL (ref 7.4–10.4)
PROT SERPL-MCNC: 7.1 G/DL (ref 6.4–8.2)
RBC # BLD AUTO: 3.88 X10^6/UL (ref 3.82–5.3)
T4 FREE SERPL-MCNC: 1.09 NG/DL (ref 0.76–1.46)

## 2020-01-16 PROCEDURE — 83970 ASSAY OF PARATHORMONE: CPT

## 2020-01-16 PROCEDURE — 86357 NK CELLS TOTAL COUNT: CPT

## 2020-01-16 PROCEDURE — 80053 COMPREHEN METABOLIC PANEL: CPT

## 2020-01-16 PROCEDURE — 84100 ASSAY OF PHOSPHORUS: CPT

## 2020-01-16 PROCEDURE — 36415 COLL VENOUS BLD VENIPUNCTURE: CPT

## 2020-01-16 PROCEDURE — 85025 COMPLETE CBC W/AUTO DIFF WBC: CPT

## 2020-01-16 PROCEDURE — 84481 FREE ASSAY (FT-3): CPT

## 2020-01-16 PROCEDURE — 86225 DNA ANTIBODY NATIVE: CPT

## 2020-01-16 PROCEDURE — 84443 ASSAY THYROID STIM HORMONE: CPT

## 2020-01-16 PROCEDURE — 86162 COMPLEMENT TOTAL (CH50): CPT

## 2020-01-16 PROCEDURE — 81001 URINALYSIS AUTO W/SCOPE: CPT

## 2020-01-16 PROCEDURE — 86355 B CELLS TOTAL COUNT: CPT

## 2020-01-16 PROCEDURE — 84439 ASSAY OF FREE THYROXINE: CPT

## 2020-01-16 PROCEDURE — 86160 COMPLEMENT ANTIGEN: CPT

## 2020-01-22 ENCOUNTER — HOSPITAL ENCOUNTER (OUTPATIENT)
Dept: HOSPITAL 8 - LAB | Age: 30
Discharge: HOME | End: 2020-01-22
Attending: INTERNAL MEDICINE
Payer: COMMERCIAL

## 2020-01-22 DIAGNOSIS — M32.9: ICD-10-CM

## 2020-01-22 DIAGNOSIS — R80.9: Primary | ICD-10-CM

## 2020-01-22 DIAGNOSIS — N25.81: ICD-10-CM

## 2020-01-22 DIAGNOSIS — E55.9: ICD-10-CM

## 2020-01-22 DIAGNOSIS — D63.1: ICD-10-CM

## 2020-01-22 DIAGNOSIS — N18.3: ICD-10-CM

## 2020-01-22 DIAGNOSIS — M32.14: ICD-10-CM

## 2020-01-22 PROCEDURE — 87086 URINE CULTURE/COLONY COUNT: CPT

## 2020-03-03 ENCOUNTER — HOSPITAL ENCOUNTER (OUTPATIENT)
Dept: HOSPITAL 8 - LAB | Age: 30
Discharge: HOME | End: 2020-03-03
Attending: INTERNAL MEDICINE
Payer: COMMERCIAL

## 2020-03-03 DIAGNOSIS — E55.9: ICD-10-CM

## 2020-03-03 DIAGNOSIS — N25.81: ICD-10-CM

## 2020-03-03 DIAGNOSIS — M32.9: ICD-10-CM

## 2020-03-03 DIAGNOSIS — D63.1: ICD-10-CM

## 2020-03-03 DIAGNOSIS — N18.3: Primary | ICD-10-CM

## 2020-03-03 DIAGNOSIS — R80.9: ICD-10-CM

## 2020-03-03 DIAGNOSIS — M32.14: ICD-10-CM

## 2020-03-03 LAB
% IRON SATURATION: 20 % (ref 20–55)
ALBUMIN SERPL-MCNC: 3.1 G/DL (ref 3.4–5)
ALP SERPL-CCNC: 97 U/L (ref 45–117)
ALT SERPL-CCNC: 19 U/L (ref 12–78)
ANION GAP SERPL CALC-SCNC: 7 MMOL/L (ref 5–15)
BASOPHILS # BLD AUTO: 0.04 X10^3/UL (ref 0–0.1)
BASOPHILS NFR BLD AUTO: 1 % (ref 0–1)
BILIRUB SERPL-MCNC: 0.3 MG/DL (ref 0.2–1)
CALCIUM SERPL-MCNC: 8.7 MG/DL (ref 8.5–10.1)
CHLORIDE SERPL-SCNC: 112 MMOL/L (ref 98–107)
CREAT SERPL-MCNC: 1.85 MG/DL (ref 0.55–1.02)
CULTURE INDICATED?: YES
EOSINOPHIL # BLD AUTO: 0.06 X10^3/UL (ref 0–0.4)
EOSINOPHIL NFR BLD AUTO: 1 % (ref 1–7)
ERYTHROCYTE [DISTWIDTH] IN BLOOD BY AUTOMATED COUNT: 12.7 % (ref 9.6–15.2)
HBV CORE IGM SERPL QL IA: 543 MG/DL (ref 0–12)
HBV SURFACE AB SER RIA-ACNC: 107 MG/DL
IRON LEVEL: 55 MCG/DL (ref 50–170)
LYMPHOCYTES # BLD AUTO: 2 X10^3/UL (ref 1–3.4)
LYMPHOCYTES NFR BLD AUTO: 34 % (ref 22–44)
MCH RBC QN AUTO: 30.7 PG (ref 27–34.8)
MCHC RBC AUTO-ENTMCNC: 33.7 G/DL (ref 32.4–35.8)
MCV RBC AUTO: 91.3 FL (ref 80–100)
MD: NO
MICROSCOPIC: (no result)
MONOCYTES # BLD AUTO: 0.51 X10^3/UL (ref 0.2–0.8)
MONOCYTES NFR BLD AUTO: 9 % (ref 2–9)
NEUTROPHILS # BLD AUTO: 3.19 X10^3/UL (ref 1.8–6.8)
NEUTROPHILS NFR BLD AUTO: 55 % (ref 42–75)
PLATELET # BLD AUTO: 292 X10^3/UL (ref 130–400)
PMV BLD AUTO: 8.6 FL (ref 7.4–10.4)
PROT SERPL-MCNC: 7.5 G/DL (ref 6.4–8.2)
RBC # BLD AUTO: 3.98 X10^6/UL (ref 3.82–5.3)
TIBC SERPL-MCNC: 278 MCG/DL (ref 250–450)

## 2020-03-03 PROCEDURE — 86160 COMPLEMENT ANTIGEN: CPT

## 2020-03-03 PROCEDURE — 86225 DNA ANTIBODY NATIVE: CPT

## 2020-03-03 PROCEDURE — 86357 NK CELLS TOTAL COUNT: CPT

## 2020-03-03 PROCEDURE — 86355 B CELLS TOTAL COUNT: CPT

## 2020-03-03 PROCEDURE — 82728 ASSAY OF FERRITIN: CPT

## 2020-03-03 PROCEDURE — 80053 COMPREHEN METABOLIC PANEL: CPT

## 2020-03-03 PROCEDURE — 36415 COLL VENOUS BLD VENIPUNCTURE: CPT

## 2020-03-03 PROCEDURE — 85025 COMPLETE CBC W/AUTO DIFF WBC: CPT

## 2020-03-03 PROCEDURE — 82306 VITAMIN D 25 HYDROXY: CPT

## 2020-03-03 PROCEDURE — 87086 URINE CULTURE/COLONY COUNT: CPT

## 2020-03-03 PROCEDURE — 81001 URINALYSIS AUTO W/SCOPE: CPT

## 2020-03-03 PROCEDURE — 83540 ASSAY OF IRON: CPT

## 2020-03-03 PROCEDURE — 83550 IRON BINDING TEST: CPT

## 2020-03-03 PROCEDURE — 86162 COMPLEMENT TOTAL (CH50): CPT

## 2020-03-03 PROCEDURE — 82570 ASSAY OF URINE CREATININE: CPT

## 2020-03-03 PROCEDURE — 84156 ASSAY OF PROTEIN URINE: CPT

## 2020-03-20 ENCOUNTER — HOSPITAL ENCOUNTER (OUTPATIENT)
Dept: HOSPITAL 8 - CFH | Age: 30
Discharge: HOME | End: 2020-03-20
Attending: INTERNAL MEDICINE
Payer: COMMERCIAL

## 2020-03-20 DIAGNOSIS — M32.14: ICD-10-CM

## 2020-03-20 DIAGNOSIS — D63.1: ICD-10-CM

## 2020-03-20 DIAGNOSIS — N25.81: ICD-10-CM

## 2020-03-20 DIAGNOSIS — R80.9: ICD-10-CM

## 2020-03-20 DIAGNOSIS — M32.9: ICD-10-CM

## 2020-03-20 DIAGNOSIS — N18.3: ICD-10-CM

## 2020-03-20 DIAGNOSIS — E55.9: Primary | ICD-10-CM

## 2020-03-20 LAB
ALBUMIN SERPL-MCNC: 3.2 G/DL (ref 3.4–5)
ALP SERPL-CCNC: 82 U/L (ref 45–117)
ALT SERPL-CCNC: 19 U/L (ref 12–78)
ANION GAP SERPL CALC-SCNC: 6 MMOL/L (ref 5–15)
BASOPHILS # BLD AUTO: 0.03 X10^3/UL (ref 0–0.1)
BASOPHILS NFR BLD AUTO: 0 % (ref 0–1)
BILIRUB SERPL-MCNC: 0.2 MG/DL (ref 0.2–1)
CALCIUM SERPL-MCNC: 8.8 MG/DL (ref 8.5–10.1)
CHLORIDE SERPL-SCNC: 113 MMOL/L (ref 98–107)
CREAT SERPL-MCNC: 1.67 MG/DL (ref 0.55–1.02)
CULTURE INDICATED?: YES
EOSINOPHIL # BLD AUTO: 0.11 X10^3/UL (ref 0–0.4)
EOSINOPHIL NFR BLD AUTO: 1 % (ref 1–7)
ERYTHROCYTE [DISTWIDTH] IN BLOOD BY AUTOMATED COUNT: 12.5 % (ref 9.6–15.2)
HBV CORE IGM SERPL QL IA: 462 MG/DL (ref 0–12)
HBV SURFACE AB SER RIA-ACNC: 102 MG/DL
LYMPHOCYTES # BLD AUTO: 2.45 X10^3/UL (ref 1–3.4)
LYMPHOCYTES NFR BLD AUTO: 31 % (ref 22–44)
MCH RBC QN AUTO: 30.6 PG (ref 27–34.8)
MCHC RBC AUTO-ENTMCNC: 33.5 G/DL (ref 32.4–35.8)
MCV RBC AUTO: 91.2 FL (ref 80–100)
MD: NO
MICROSCOPIC: (no result)
MONOCYTES # BLD AUTO: 0.59 X10^3/UL (ref 0.2–0.8)
MONOCYTES NFR BLD AUTO: 8 % (ref 2–9)
NEUTROPHILS # BLD AUTO: 4.65 X10^3/UL (ref 1.8–6.8)
NEUTROPHILS NFR BLD AUTO: 59 % (ref 42–75)
PLATELET # BLD AUTO: 291 X10^3/UL (ref 130–400)
PMV BLD AUTO: 8.9 FL (ref 7.4–10.4)
PROT SERPL-MCNC: 7.3 G/DL (ref 6.4–8.2)
RBC # BLD AUTO: 4.14 X10^6/UL (ref 3.82–5.3)

## 2020-03-20 PROCEDURE — 80053 COMPREHEN METABOLIC PANEL: CPT

## 2020-03-20 PROCEDURE — 82570 ASSAY OF URINE CREATININE: CPT

## 2020-03-20 PROCEDURE — 85025 COMPLETE CBC W/AUTO DIFF WBC: CPT

## 2020-03-20 PROCEDURE — 84156 ASSAY OF PROTEIN URINE: CPT

## 2020-03-20 PROCEDURE — 87086 URINE CULTURE/COLONY COUNT: CPT

## 2020-03-20 PROCEDURE — 82784 ASSAY IGA/IGD/IGG/IGM EACH: CPT

## 2020-03-20 PROCEDURE — 82785 ASSAY OF IGE: CPT

## 2020-03-20 PROCEDURE — 81001 URINALYSIS AUTO W/SCOPE: CPT

## 2020-03-20 PROCEDURE — 36415 COLL VENOUS BLD VENIPUNCTURE: CPT

## 2020-05-08 ENCOUNTER — HOSPITAL ENCOUNTER (OUTPATIENT)
Dept: HOSPITAL 8 - CFH | Age: 30
Discharge: HOME | End: 2020-05-08
Attending: INTERNAL MEDICINE
Payer: COMMERCIAL

## 2020-05-08 DIAGNOSIS — R22.1: ICD-10-CM

## 2020-05-08 DIAGNOSIS — E04.1: Primary | ICD-10-CM

## 2020-05-08 PROCEDURE — 76536 US EXAM OF HEAD AND NECK: CPT

## 2020-10-21 ENCOUNTER — HOSPITAL ENCOUNTER (OUTPATIENT)
Dept: HOSPITAL 8 - CFH | Age: 30
Discharge: HOME | End: 2020-10-21
Attending: INTERNAL MEDICINE
Payer: COMMERCIAL

## 2020-10-21 DIAGNOSIS — I42.9: Primary | ICD-10-CM

## 2020-10-21 PROCEDURE — 93356 MYOCRD STRAIN IMG SPCKL TRCK: CPT

## 2020-10-21 PROCEDURE — 93306 TTE W/DOPPLER COMPLETE: CPT

## 2020-10-27 ENCOUNTER — HOSPITAL ENCOUNTER (OUTPATIENT)
Dept: HOSPITAL 8 - LAB | Age: 30
Discharge: HOME | End: 2020-10-27
Attending: INTERNAL MEDICINE
Payer: COMMERCIAL

## 2020-10-27 DIAGNOSIS — M32.9: ICD-10-CM

## 2020-10-27 DIAGNOSIS — D63.1: ICD-10-CM

## 2020-10-27 DIAGNOSIS — R80.9: ICD-10-CM

## 2020-10-27 DIAGNOSIS — E79.0: ICD-10-CM

## 2020-10-27 DIAGNOSIS — N25.81: ICD-10-CM

## 2020-10-27 DIAGNOSIS — N18.30: ICD-10-CM

## 2020-10-27 DIAGNOSIS — I12.9: Primary | ICD-10-CM

## 2020-10-27 DIAGNOSIS — M32.14: ICD-10-CM

## 2020-10-27 DIAGNOSIS — E55.9: ICD-10-CM

## 2020-10-27 LAB
ALBUMIN SERPL-MCNC: 3 G/DL (ref 3.4–5)
ALP SERPL-CCNC: 98 U/L (ref 45–117)
ALT SERPL-CCNC: 19 U/L (ref 12–78)
ANION GAP SERPL CALC-SCNC: 7 MMOL/L (ref 5–15)
BASOPHILS # BLD AUTO: 0 X10^3/UL (ref 0–0.1)
BASOPHILS NFR BLD AUTO: 1 % (ref 0–1)
BILIRUB SERPL-MCNC: 0.4 MG/DL (ref 0.2–1)
CALCIUM SERPL-MCNC: 8.6 MG/DL (ref 8.5–10.1)
CHLORIDE SERPL-SCNC: 110 MMOL/L (ref 98–107)
CREAT SERPL-MCNC: 1.84 MG/DL (ref 0.55–1.02)
EOSINOPHIL # BLD AUTO: 0.1 X10^3/UL (ref 0–0.4)
EOSINOPHIL NFR BLD AUTO: 1 % (ref 1–7)
ERYTHROCYTE [DISTWIDTH] IN BLOOD BY AUTOMATED COUNT: 12.2 % (ref 9.6–15.2)
HBV CORE IGM SERPL QL IA: 407 MG/DL (ref 0–12)
HBV SURFACE AB SER RIA-ACNC: 68.5 MG/DL
LYMPHOCYTES # BLD AUTO: 2.2 X10^3/UL (ref 1–3.4)
LYMPHOCYTES NFR BLD AUTO: 33 % (ref 22–44)
MCH RBC QN AUTO: 29.9 PG (ref 27–34.8)
MCHC RBC AUTO-ENTMCNC: 32.6 G/DL (ref 32.4–35.8)
MD: NO
MICROSCOPIC: (no result)
MONOCYTES # BLD AUTO: 0.5 X10^3/UL (ref 0.2–0.8)
MONOCYTES NFR BLD AUTO: 7 % (ref 2–9)
NEUTROPHILS # BLD AUTO: 3.7 X10^3/UL (ref 1.8–6.8)
NEUTROPHILS NFR BLD AUTO: 58 % (ref 42–75)
PLATELET # BLD AUTO: 318 X10^3/UL (ref 130–400)
PMV BLD AUTO: 8.5 FL (ref 7.4–10.4)
PROT SERPL-MCNC: 7 G/DL (ref 6.4–8.2)
RBC # BLD AUTO: 4.02 X10^6/UL (ref 3.82–5.3)

## 2020-10-27 PROCEDURE — 84100 ASSAY OF PHOSPHORUS: CPT

## 2020-10-27 PROCEDURE — 82043 UR ALBUMIN QUANTITATIVE: CPT

## 2020-10-27 PROCEDURE — 83735 ASSAY OF MAGNESIUM: CPT

## 2020-10-27 PROCEDURE — 86160 COMPLEMENT ANTIGEN: CPT

## 2020-10-27 PROCEDURE — 86225 DNA ANTIBODY NATIVE: CPT

## 2020-10-27 PROCEDURE — 86162 COMPLEMENT TOTAL (CH50): CPT

## 2020-10-27 PROCEDURE — 81001 URINALYSIS AUTO W/SCOPE: CPT

## 2020-10-27 PROCEDURE — 82570 ASSAY OF URINE CREATININE: CPT

## 2020-10-27 PROCEDURE — 80053 COMPREHEN METABOLIC PANEL: CPT

## 2020-10-27 PROCEDURE — 85025 COMPLETE CBC W/AUTO DIFF WBC: CPT

## 2020-10-27 PROCEDURE — 36415 COLL VENOUS BLD VENIPUNCTURE: CPT

## 2020-10-27 PROCEDURE — 84156 ASSAY OF PROTEIN URINE: CPT

## 2020-12-22 DIAGNOSIS — Z23 NEED FOR VACCINATION: ICD-10-CM

## 2021-01-12 ENCOUNTER — HOSPITAL ENCOUNTER (OUTPATIENT)
Dept: HOSPITAL 8 - LAB | Age: 31
Discharge: HOME | End: 2021-01-12
Attending: INTERNAL MEDICINE
Payer: COMMERCIAL

## 2021-01-12 DIAGNOSIS — E79.0: ICD-10-CM

## 2021-01-12 DIAGNOSIS — N25.81: ICD-10-CM

## 2021-01-12 DIAGNOSIS — R80.9: ICD-10-CM

## 2021-01-12 DIAGNOSIS — D63.1: ICD-10-CM

## 2021-01-12 DIAGNOSIS — M32.9: ICD-10-CM

## 2021-01-12 DIAGNOSIS — E55.9: ICD-10-CM

## 2021-01-12 DIAGNOSIS — N18.30: ICD-10-CM

## 2021-01-12 DIAGNOSIS — I12.9: Primary | ICD-10-CM

## 2021-01-12 DIAGNOSIS — M32.14: ICD-10-CM

## 2021-01-12 LAB
% IRON SATURATION: 20 % (ref 20–55)
ALBUMIN SERPL-MCNC: 3 G/DL (ref 3.4–5)
ALP SERPL-CCNC: 92 U/L (ref 45–117)
ALT SERPL-CCNC: 29 U/L (ref 12–78)
ANION GAP SERPL CALC-SCNC: 5 MMOL/L (ref 5–15)
BASOPHILS # BLD AUTO: 0 X10^3/UL (ref 0–0.1)
BASOPHILS NFR BLD AUTO: 1 % (ref 0–1)
BILIRUB SERPL-MCNC: 0.3 MG/DL (ref 0.2–1)
CALCIUM SERPL-MCNC: 8.8 MG/DL (ref 8.5–10.1)
CALCIUM SERPL-MCNC: 8.8 MG/DL (ref 8.5–10.1)
CHLORIDE SERPL-SCNC: 113 MMOL/L (ref 98–107)
CREAT SERPL-MCNC: 2.21 MG/DL (ref 0.55–1.02)
EOSINOPHIL # BLD AUTO: 0.1 X10^3/UL (ref 0–0.4)
EOSINOPHIL NFR BLD AUTO: 1 % (ref 1–7)
ERYTHROCYTE [DISTWIDTH] IN BLOOD BY AUTOMATED COUNT: 12.8 % (ref 9.6–15.2)
HBV CORE IGM SERPL QL IA: 656 MG/DL (ref 0–12)
HBV SURFACE AB SER RIA-ACNC: 170 MG/DL
IRON LEVEL: 48 MCG/DL (ref 50–170)
LYMPHOCYTES # BLD AUTO: 1 X10^3/UL (ref 1–3.4)
LYMPHOCYTES NFR BLD AUTO: 25 % (ref 22–44)
MCH RBC QN AUTO: 31 PG (ref 27–34.8)
MCHC RBC AUTO-ENTMCNC: 33.6 G/DL (ref 32.4–35.8)
MD: NO
MICROSCOPIC: (no result)
MONOCYTES # BLD AUTO: 0.5 X10^3/UL (ref 0.2–0.8)
MONOCYTES NFR BLD AUTO: 12 % (ref 2–9)
NEUTROPHILS # BLD AUTO: 2.5 X10^3/UL (ref 1.8–6.8)
NEUTROPHILS NFR BLD AUTO: 62 % (ref 42–75)
PLATELET # BLD AUTO: 284 X10^3/UL (ref 130–400)
PMV BLD AUTO: 8.1 FL (ref 7.4–10.4)
PROT SERPL-MCNC: 6.8 G/DL (ref 6.4–8.2)
RBC # BLD AUTO: 4.08 X10^6/UL (ref 3.82–5.3)
TIBC SERPL-MCNC: 237 MCG/DL (ref 250–450)

## 2021-01-12 PROCEDURE — 87086 URINE CULTURE/COLONY COUNT: CPT

## 2021-01-12 PROCEDURE — 83735 ASSAY OF MAGNESIUM: CPT

## 2021-01-12 PROCEDURE — 82306 VITAMIN D 25 HYDROXY: CPT

## 2021-01-12 PROCEDURE — 84100 ASSAY OF PHOSPHORUS: CPT

## 2021-01-12 PROCEDURE — 36415 COLL VENOUS BLD VENIPUNCTURE: CPT

## 2021-01-12 PROCEDURE — 83540 ASSAY OF IRON: CPT

## 2021-01-12 PROCEDURE — 86160 COMPLEMENT ANTIGEN: CPT

## 2021-01-12 PROCEDURE — 86162 COMPLEMENT TOTAL (CH50): CPT

## 2021-01-12 PROCEDURE — 84156 ASSAY OF PROTEIN URINE: CPT

## 2021-01-12 PROCEDURE — 82043 UR ALBUMIN QUANTITATIVE: CPT

## 2021-01-12 PROCEDURE — 85025 COMPLETE CBC W/AUTO DIFF WBC: CPT

## 2021-01-12 PROCEDURE — 87077 CULTURE AEROBIC IDENTIFY: CPT

## 2021-01-12 PROCEDURE — 82310 ASSAY OF CALCIUM: CPT

## 2021-01-12 PROCEDURE — 80053 COMPREHEN METABOLIC PANEL: CPT

## 2021-01-12 PROCEDURE — 86225 DNA ANTIBODY NATIVE: CPT

## 2021-01-12 PROCEDURE — 83550 IRON BINDING TEST: CPT

## 2021-01-12 PROCEDURE — 84550 ASSAY OF BLOOD/URIC ACID: CPT

## 2021-01-12 PROCEDURE — 83970 ASSAY OF PARATHORMONE: CPT

## 2021-01-12 PROCEDURE — 81001 URINALYSIS AUTO W/SCOPE: CPT

## 2021-01-12 PROCEDURE — 82728 ASSAY OF FERRITIN: CPT

## 2021-01-12 PROCEDURE — 82570 ASSAY OF URINE CREATININE: CPT

## 2021-02-26 ENCOUNTER — HOSPITAL ENCOUNTER (OUTPATIENT)
Dept: HOSPITAL 8 - LAB | Age: 31
Discharge: HOME | End: 2021-02-26
Attending: INTERNAL MEDICINE
Payer: COMMERCIAL

## 2021-02-26 DIAGNOSIS — R80.9: ICD-10-CM

## 2021-02-26 DIAGNOSIS — N25.81: ICD-10-CM

## 2021-02-26 DIAGNOSIS — E55.9: Primary | ICD-10-CM

## 2021-02-26 DIAGNOSIS — E79.0: ICD-10-CM

## 2021-02-26 DIAGNOSIS — I12.9: ICD-10-CM

## 2021-02-26 DIAGNOSIS — N18.9: ICD-10-CM

## 2021-02-26 DIAGNOSIS — D63.1: ICD-10-CM

## 2021-02-26 DIAGNOSIS — M32.9: ICD-10-CM

## 2021-02-26 DIAGNOSIS — M32.14: ICD-10-CM

## 2021-02-26 LAB
ALBUMIN SERPL-MCNC: 3.3 G/DL (ref 3.4–5)
ALP SERPL-CCNC: 79 U/L (ref 45–117)
ALT SERPL-CCNC: 33 U/L (ref 12–78)
ANION GAP SERPL CALC-SCNC: 7 MMOL/L (ref 5–15)
BASOPHILS # BLD AUTO: 0.1 X10^3/UL (ref 0–0.1)
BASOPHILS NFR BLD AUTO: 1 % (ref 0–1)
BILIRUB SERPL-MCNC: 0.2 MG/DL (ref 0.2–1)
CALCIUM SERPL-MCNC: 9 MG/DL (ref 8.5–10.1)
CHLORIDE SERPL-SCNC: 113 MMOL/L (ref 98–107)
CREAT SERPL-MCNC: 2.29 MG/DL (ref 0.55–1.02)
EOSINOPHIL # BLD AUTO: 0.1 X10^3/UL (ref 0–0.4)
EOSINOPHIL NFR BLD AUTO: 1 % (ref 1–7)
ERYTHROCYTE [DISTWIDTH] IN BLOOD BY AUTOMATED COUNT: 12.4 % (ref 9.6–15.2)
HBV CORE IGM SERPL QL IA: 391 MG/DL (ref 0–12)
HBV SURFACE AB SER RIA-ACNC: 111 MG/DL
LYMPHOCYTES # BLD AUTO: 2.5 X10^3/UL (ref 1–3.4)
LYMPHOCYTES NFR BLD AUTO: 27 % (ref 22–44)
MCH RBC QN AUTO: 30.7 PG (ref 27–34.8)
MCHC RBC AUTO-ENTMCNC: 33.5 G/DL (ref 32.4–35.8)
MD: NO
MICROSCOPIC: (no result)
MONOCYTES # BLD AUTO: 0.6 X10^3/UL (ref 0.2–0.8)
MONOCYTES NFR BLD AUTO: 6 % (ref 2–9)
NEUTROPHILS # BLD AUTO: 6.2 X10^3/UL (ref 1.8–6.8)
NEUTROPHILS NFR BLD AUTO: 65 % (ref 42–75)
PLATELET # BLD AUTO: 319 X10^3/UL (ref 130–400)
PMV BLD AUTO: 8.4 FL (ref 7.4–10.4)
PROT SERPL-MCNC: 7.2 G/DL (ref 6.4–8.2)
RBC # BLD AUTO: 4.07 X10^6/UL (ref 3.82–5.3)
T4 FREE SERPL-MCNC: 1.1 NG/DL (ref 0.76–1.46)

## 2021-02-26 PROCEDURE — 81001 URINALYSIS AUTO W/SCOPE: CPT

## 2021-02-26 PROCEDURE — 80053 COMPREHEN METABOLIC PANEL: CPT

## 2021-02-26 PROCEDURE — 84443 ASSAY THYROID STIM HORMONE: CPT

## 2021-02-26 PROCEDURE — 85025 COMPLETE CBC W/AUTO DIFF WBC: CPT

## 2021-02-26 PROCEDURE — 86160 COMPLEMENT ANTIGEN: CPT

## 2021-02-26 PROCEDURE — 84481 FREE ASSAY (FT-3): CPT

## 2021-02-26 PROCEDURE — 84156 ASSAY OF PROTEIN URINE: CPT

## 2021-02-26 PROCEDURE — 84439 ASSAY OF FREE THYROXINE: CPT

## 2021-02-26 PROCEDURE — 84100 ASSAY OF PHOSPHORUS: CPT

## 2021-02-26 PROCEDURE — 36415 COLL VENOUS BLD VENIPUNCTURE: CPT

## 2021-02-26 PROCEDURE — 82570 ASSAY OF URINE CREATININE: CPT

## 2021-02-26 PROCEDURE — 86162 COMPLEMENT TOTAL (CH50): CPT

## 2021-02-26 PROCEDURE — 87086 URINE CULTURE/COLONY COUNT: CPT

## 2021-02-26 PROCEDURE — 83735 ASSAY OF MAGNESIUM: CPT

## 2021-05-06 ENCOUNTER — HOSPITAL ENCOUNTER (OUTPATIENT)
Dept: HOSPITAL 8 - LAB | Age: 31
Discharge: HOME | End: 2021-05-06
Attending: INTERNAL MEDICINE
Payer: COMMERCIAL

## 2021-05-06 DIAGNOSIS — D63.1: ICD-10-CM

## 2021-05-06 DIAGNOSIS — I12.9: Primary | ICD-10-CM

## 2021-05-06 DIAGNOSIS — N18.4: ICD-10-CM

## 2021-05-06 DIAGNOSIS — M32.9: ICD-10-CM

## 2021-05-06 DIAGNOSIS — N25.81: ICD-10-CM

## 2021-05-06 DIAGNOSIS — E55.9: ICD-10-CM

## 2021-05-06 DIAGNOSIS — E79.0: ICD-10-CM

## 2021-05-06 DIAGNOSIS — M32.14: ICD-10-CM

## 2021-05-06 DIAGNOSIS — R80.9: ICD-10-CM

## 2021-05-06 LAB
% IRON SATURATION: 34 % (ref 20–55)
ALBUMIN SERPL-MCNC: 3.2 G/DL (ref 3.4–5)
ALP SERPL-CCNC: 63 U/L (ref 45–117)
ALT SERPL-CCNC: 32 U/L (ref 12–78)
ANION GAP SERPL CALC-SCNC: 4 MMOL/L (ref 5–15)
BASOPHILS # BLD AUTO: 0 X10^3/UL (ref 0–0.1)
BASOPHILS NFR BLD AUTO: 1 % (ref 0–1)
BILIRUB SERPL-MCNC: 0.3 MG/DL (ref 0.2–1)
CALCIUM SERPL-MCNC: 8.3 MG/DL (ref 8.5–10.1)
CHLORIDE SERPL-SCNC: 112 MMOL/L (ref 98–107)
CREAT SERPL-MCNC: 1.89 MG/DL (ref 0.55–1.02)
EOSINOPHIL # BLD AUTO: 0.1 X10^3/UL (ref 0–0.4)
EOSINOPHIL NFR BLD AUTO: 1 % (ref 1–7)
ERYTHROCYTE [DISTWIDTH] IN BLOOD BY AUTOMATED COUNT: 12.7 % (ref 9.6–15.2)
HBV CORE IGM SERPL QL IA: 431 MG/DL (ref 0–12)
HBV SURFACE AB SER RIA-ACNC: 114 MG/DL
IRON LEVEL: 85 MCG/DL (ref 50–170)
LYMPHOCYTES # BLD AUTO: 2.2 X10^3/UL (ref 1–3.4)
LYMPHOCYTES NFR BLD AUTO: 35 % (ref 22–44)
MCH RBC QN AUTO: 30.6 PG (ref 27–34.8)
MCHC RBC AUTO-ENTMCNC: 33.3 G/DL (ref 32.4–35.8)
MD: NO
MICROSCOPIC: (no result)
MONOCYTES # BLD AUTO: 0.5 X10^3/UL (ref 0.2–0.8)
MONOCYTES NFR BLD AUTO: 7 % (ref 2–9)
NEUTROPHILS # BLD AUTO: 3.6 X10^3/UL (ref 1.8–6.8)
NEUTROPHILS NFR BLD AUTO: 56 % (ref 42–75)
PLATELET # BLD AUTO: 302 X10^3/UL (ref 130–400)
PMV BLD AUTO: 8.6 FL (ref 7.4–10.4)
PROT SERPL-MCNC: 6.9 G/DL (ref 6.4–8.2)
RBC # BLD AUTO: 3.79 X10^6/UL (ref 3.82–5.3)
TIBC SERPL-MCNC: 248 MCG/DL (ref 250–450)

## 2021-05-06 PROCEDURE — 85025 COMPLETE CBC W/AUTO DIFF WBC: CPT

## 2021-05-06 PROCEDURE — 82043 UR ALBUMIN QUANTITATIVE: CPT

## 2021-05-06 PROCEDURE — 84100 ASSAY OF PHOSPHORUS: CPT

## 2021-05-06 PROCEDURE — 82728 ASSAY OF FERRITIN: CPT

## 2021-05-06 PROCEDURE — 84550 ASSAY OF BLOOD/URIC ACID: CPT

## 2021-05-06 PROCEDURE — 80053 COMPREHEN METABOLIC PANEL: CPT

## 2021-05-06 PROCEDURE — 81001 URINALYSIS AUTO W/SCOPE: CPT

## 2021-05-06 PROCEDURE — 83540 ASSAY OF IRON: CPT

## 2021-05-06 PROCEDURE — 36415 COLL VENOUS BLD VENIPUNCTURE: CPT

## 2021-05-06 PROCEDURE — 83550 IRON BINDING TEST: CPT

## 2021-05-06 PROCEDURE — 84156 ASSAY OF PROTEIN URINE: CPT

## 2021-05-06 PROCEDURE — 83735 ASSAY OF MAGNESIUM: CPT

## 2021-05-06 PROCEDURE — 82570 ASSAY OF URINE CREATININE: CPT

## 2021-07-19 ENCOUNTER — HOSPITAL ENCOUNTER (OUTPATIENT)
Dept: HOSPITAL 8 - LAB | Age: 31
Discharge: HOME | End: 2021-07-19
Attending: INTERNAL MEDICINE
Payer: COMMERCIAL

## 2021-07-19 DIAGNOSIS — M32.14: ICD-10-CM

## 2021-07-19 DIAGNOSIS — M32.9: ICD-10-CM

## 2021-07-19 DIAGNOSIS — I12.9: Primary | ICD-10-CM

## 2021-07-19 DIAGNOSIS — E55.9: ICD-10-CM

## 2021-07-19 DIAGNOSIS — E79.0: ICD-10-CM

## 2021-07-19 DIAGNOSIS — N18.4: ICD-10-CM

## 2021-07-19 DIAGNOSIS — N25.81: ICD-10-CM

## 2021-07-19 DIAGNOSIS — D63.1: ICD-10-CM

## 2021-07-19 DIAGNOSIS — R80.9: ICD-10-CM

## 2021-07-19 LAB
ALBUMIN SERPL-MCNC: 3 G/DL (ref 3.4–5)
ALP SERPL-CCNC: 76 U/L (ref 45–117)
ALT SERPL-CCNC: 20 U/L (ref 12–78)
ANION GAP SERPL CALC-SCNC: 3 MMOL/L (ref 5–15)
BASOPHILS # BLD AUTO: 0 X10^3/UL (ref 0–0.1)
BASOPHILS NFR BLD AUTO: 1 % (ref 0–1)
BILIRUB SERPL-MCNC: 0.3 MG/DL (ref 0.2–1)
CALCIUM SERPL-MCNC: 8.8 MG/DL (ref 8.5–10.1)
CHLORIDE SERPL-SCNC: 111 MMOL/L (ref 98–107)
CREAT SERPL-MCNC: 2.12 MG/DL (ref 0.55–1.02)
EOSINOPHIL # BLD AUTO: 0.1 X10^3/UL (ref 0–0.4)
EOSINOPHIL NFR BLD AUTO: 1 % (ref 1–7)
ERYTHROCYTE [DISTWIDTH] IN BLOOD BY AUTOMATED COUNT: 12.3 % (ref 9.6–15.2)
HBV CORE IGM SERPL QL IA: 528 MG/DL (ref 0–12)
HBV SURFACE AB SER RIA-ACNC: 134 MG/DL
LYMPHOCYTES # BLD AUTO: 2 X10^3/UL (ref 1–3.4)
LYMPHOCYTES NFR BLD AUTO: 31 % (ref 22–44)
MCH RBC QN AUTO: 31.2 PG (ref 27–34.8)
MCHC RBC AUTO-ENTMCNC: 33.7 G/DL (ref 32.4–35.8)
MICROSCOPIC: (no result)
MONOCYTES # BLD AUTO: 0.4 X10^3/UL (ref 0.2–0.8)
MONOCYTES NFR BLD AUTO: 7 % (ref 2–9)
NEUTROPHILS # BLD AUTO: 3.8 X10^3/UL (ref 1.8–6.8)
NEUTROPHILS NFR BLD AUTO: 60 % (ref 42–75)
PLATELET # BLD AUTO: 301 X10^3/UL (ref 130–400)
PMV BLD AUTO: 8.7 FL (ref 7.4–10.4)
PROT SERPL-MCNC: 7.2 G/DL (ref 6.4–8.2)
RBC # BLD AUTO: 4 X10^6/UL (ref 3.82–5.3)

## 2021-07-19 PROCEDURE — 83970 ASSAY OF PARATHORMONE: CPT

## 2021-07-19 PROCEDURE — 84100 ASSAY OF PHOSPHORUS: CPT

## 2021-07-19 PROCEDURE — 84550 ASSAY OF BLOOD/URIC ACID: CPT

## 2021-07-19 PROCEDURE — 81001 URINALYSIS AUTO W/SCOPE: CPT

## 2021-07-19 PROCEDURE — 36415 COLL VENOUS BLD VENIPUNCTURE: CPT

## 2021-07-19 PROCEDURE — 86225 DNA ANTIBODY NATIVE: CPT

## 2021-07-19 PROCEDURE — 87086 URINE CULTURE/COLONY COUNT: CPT

## 2021-07-19 PROCEDURE — 84156 ASSAY OF PROTEIN URINE: CPT

## 2021-07-19 PROCEDURE — 86162 COMPLEMENT TOTAL (CH50): CPT

## 2021-07-19 PROCEDURE — 83735 ASSAY OF MAGNESIUM: CPT

## 2021-07-19 PROCEDURE — 86160 COMPLEMENT ANTIGEN: CPT

## 2021-07-19 PROCEDURE — 85025 COMPLETE CBC W/AUTO DIFF WBC: CPT

## 2021-07-19 PROCEDURE — 82043 UR ALBUMIN QUANTITATIVE: CPT

## 2021-07-19 PROCEDURE — 82310 ASSAY OF CALCIUM: CPT

## 2021-07-19 PROCEDURE — 82570 ASSAY OF URINE CREATININE: CPT

## 2021-07-19 PROCEDURE — 82306 VITAMIN D 25 HYDROXY: CPT

## 2021-07-19 PROCEDURE — 80053 COMPREHEN METABOLIC PANEL: CPT

## 2021-08-16 ENCOUNTER — HOSPITAL ENCOUNTER (OUTPATIENT)
Dept: HOSPITAL 8 - LAB | Age: 31
Discharge: HOME | End: 2021-08-16
Attending: INTERNAL MEDICINE
Payer: COMMERCIAL

## 2021-08-16 DIAGNOSIS — M32.9: ICD-10-CM

## 2021-08-16 DIAGNOSIS — I12.9: Primary | ICD-10-CM

## 2021-08-16 DIAGNOSIS — M32.14: ICD-10-CM

## 2021-08-16 DIAGNOSIS — R80.9: ICD-10-CM

## 2021-08-16 DIAGNOSIS — N25.81: ICD-10-CM

## 2021-08-16 DIAGNOSIS — R79.9: ICD-10-CM

## 2021-08-16 DIAGNOSIS — E55.9: ICD-10-CM

## 2021-08-16 DIAGNOSIS — D63.1: ICD-10-CM

## 2021-08-16 DIAGNOSIS — N18.4: ICD-10-CM

## 2021-08-16 LAB
% IRON SATURATION: 30 % (ref 20–55)
ALBUMIN SERPL-MCNC: 2.9 G/DL (ref 3.4–5)
ALP SERPL-CCNC: 66 U/L (ref 45–117)
ALT SERPL-CCNC: 18 U/L (ref 12–78)
ANION GAP SERPL CALC-SCNC: 6 MMOL/L (ref 5–15)
BASOPHILS # BLD AUTO: 0 X10^3/UL (ref 0–0.1)
BASOPHILS NFR BLD AUTO: 1 % (ref 0–1)
BILIRUB SERPL-MCNC: 0.3 MG/DL (ref 0.2–1)
CALCIUM SERPL-MCNC: 8.8 MG/DL (ref 8.5–10.1)
CHLORIDE SERPL-SCNC: 109 MMOL/L (ref 98–107)
CREAT SERPL-MCNC: 2.35 MG/DL (ref 0.55–1.02)
EOSINOPHIL # BLD AUTO: 0.1 X10^3/UL (ref 0–0.4)
EOSINOPHIL NFR BLD AUTO: 1 % (ref 1–7)
ERYTHROCYTE [DISTWIDTH] IN BLOOD BY AUTOMATED COUNT: 12.2 % (ref 9.6–15.2)
HBV CORE IGM SERPL QL IA: 696 MG/DL (ref 0–12)
HBV SURFACE AB SER RIA-ACNC: 199 MG/DL
IRON LEVEL: 78 MCG/DL (ref 50–170)
LYMPHOCYTES # BLD AUTO: 2.5 X10^3/UL (ref 1–3.4)
LYMPHOCYTES NFR BLD AUTO: 34 % (ref 22–44)
MCH RBC QN AUTO: 30.3 PG (ref 27–34.8)
MCHC RBC AUTO-ENTMCNC: 33.5 G/DL (ref 32.4–35.8)
MICROSCOPIC: (no result)
MONOCYTES # BLD AUTO: 0.4 X10^3/UL (ref 0.2–0.8)
MONOCYTES NFR BLD AUTO: 6 % (ref 2–9)
NEUTROPHILS # BLD AUTO: 4.3 X10^3/UL (ref 1.8–6.8)
NEUTROPHILS NFR BLD AUTO: 58 % (ref 42–75)
PLATELET # BLD AUTO: 318 X10^3/UL (ref 130–400)
PMV BLD AUTO: 8.1 FL (ref 7.4–10.4)
PROT SERPL-MCNC: 7.3 G/DL (ref 6.4–8.2)
RBC # BLD AUTO: 4.09 X10^6/UL (ref 3.82–5.3)
TIBC SERPL-MCNC: 262 MCG/DL (ref 250–450)

## 2021-08-16 PROCEDURE — 86160 COMPLEMENT ANTIGEN: CPT

## 2021-08-16 PROCEDURE — 87086 URINE CULTURE/COLONY COUNT: CPT

## 2021-08-16 PROCEDURE — 82570 ASSAY OF URINE CREATININE: CPT

## 2021-08-16 PROCEDURE — 83550 IRON BINDING TEST: CPT

## 2021-08-16 PROCEDURE — 80053 COMPREHEN METABOLIC PANEL: CPT

## 2021-08-16 PROCEDURE — 86225 DNA ANTIBODY NATIVE: CPT

## 2021-08-16 PROCEDURE — 84100 ASSAY OF PHOSPHORUS: CPT

## 2021-08-16 PROCEDURE — 85025 COMPLETE CBC W/AUTO DIFF WBC: CPT

## 2021-08-16 PROCEDURE — 83540 ASSAY OF IRON: CPT

## 2021-08-16 PROCEDURE — 81001 URINALYSIS AUTO W/SCOPE: CPT

## 2021-08-16 PROCEDURE — 84156 ASSAY OF PROTEIN URINE: CPT

## 2021-08-16 PROCEDURE — 86162 COMPLEMENT TOTAL (CH50): CPT

## 2021-08-16 PROCEDURE — 83735 ASSAY OF MAGNESIUM: CPT

## 2021-08-16 PROCEDURE — 82728 ASSAY OF FERRITIN: CPT

## 2021-08-16 PROCEDURE — 36415 COLL VENOUS BLD VENIPUNCTURE: CPT

## 2021-09-09 ENCOUNTER — HOSPITAL ENCOUNTER (EMERGENCY)
Dept: HOSPITAL 8 - ED | Age: 31
Discharge: HOME | End: 2021-09-09
Payer: COMMERCIAL

## 2021-09-09 VITALS — BODY MASS INDEX: 32.07 KG/M2 | HEIGHT: 65 IN | WEIGHT: 192.46 LBS

## 2021-09-09 VITALS — SYSTOLIC BLOOD PRESSURE: 124 MMHG | DIASTOLIC BLOOD PRESSURE: 77 MMHG

## 2021-09-09 DIAGNOSIS — S61.031A: Primary | ICD-10-CM

## 2021-09-09 DIAGNOSIS — I50.9: ICD-10-CM

## 2021-09-09 DIAGNOSIS — I11.0: ICD-10-CM
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
## 2021-09-09 PROCEDURE — 87340 HEPATITIS B SURFACE AG IA: CPT

## 2021-09-09 PROCEDURE — 99283 EMERGENCY DEPT VISIT LOW MDM: CPT

## 2021-09-09 PROCEDURE — 86706 HEP B SURFACE ANTIBODY: CPT

## 2021-09-09 PROCEDURE — 87806 HIV AG W/HIV1&2 ANTB W/OPTIC: CPT

## 2021-09-09 PROCEDURE — 36415 COLL VENOUS BLD VENIPUNCTURE: CPT

## 2021-09-09 PROCEDURE — 86803 HEPATITIS C AB TEST: CPT

## 2021-09-09 PROCEDURE — G0475 HIV COMBINATION ASSAY: HCPCS

## 2021-09-09 PROCEDURE — 86705 HEP B CORE ANTIBODY IGM: CPT

## 2021-09-14 ENCOUNTER — HOSPITAL ENCOUNTER (OUTPATIENT)
Dept: HOSPITAL 8 - LAB | Age: 31
Discharge: HOME | End: 2021-09-14
Attending: INTERNAL MEDICINE
Payer: COMMERCIAL

## 2021-09-14 DIAGNOSIS — N25.81: ICD-10-CM

## 2021-09-14 DIAGNOSIS — M32.14: ICD-10-CM

## 2021-09-14 DIAGNOSIS — D63.1: ICD-10-CM

## 2021-09-14 DIAGNOSIS — R80.9: ICD-10-CM

## 2021-09-14 DIAGNOSIS — M32.9: ICD-10-CM

## 2021-09-14 DIAGNOSIS — E55.9: ICD-10-CM

## 2021-09-14 DIAGNOSIS — N18.4: ICD-10-CM

## 2021-09-14 DIAGNOSIS — E79.0: ICD-10-CM

## 2021-09-14 DIAGNOSIS — I12.9: Primary | ICD-10-CM

## 2021-09-14 LAB
ALBUMIN SERPL-MCNC: 3.2 G/DL (ref 3.4–5)
ALP SERPL-CCNC: 69 U/L (ref 45–117)
ALT SERPL-CCNC: 20 U/L (ref 12–78)
ANION GAP SERPL CALC-SCNC: 5 MMOL/L (ref 5–15)
BASOPHILS # BLD AUTO: 0.1 X10^3/UL (ref 0–0.1)
BASOPHILS NFR BLD AUTO: 1 % (ref 0–1)
BILIRUB SERPL-MCNC: 0.3 MG/DL (ref 0.2–1)
CALCIUM SERPL-MCNC: 8.9 MG/DL (ref 8.5–10.1)
CHLORIDE SERPL-SCNC: 112 MMOL/L (ref 98–107)
CREAT SERPL-MCNC: 2.15 MG/DL (ref 0.55–1.02)
EOSINOPHIL # BLD AUTO: 0.1 X10^3/UL (ref 0–0.4)
EOSINOPHIL NFR BLD AUTO: 1 % (ref 1–7)
ERYTHROCYTE [DISTWIDTH] IN BLOOD BY AUTOMATED COUNT: 12.4 % (ref 9.6–15.2)
HBV CORE IGM SERPL QL IA: 439 MG/DL (ref 0–12)
HBV SURFACE AB SER RIA-ACNC: 133 MG/DL
LYMPHOCYTES # BLD AUTO: 2.5 X10^3/UL (ref 1–3.4)
LYMPHOCYTES NFR BLD AUTO: 35 % (ref 22–44)
MCH RBC QN AUTO: 30.8 PG (ref 27–34.8)
MCHC RBC AUTO-ENTMCNC: 33.7 G/DL (ref 32.4–35.8)
MICROSCOPIC: (no result)
MONOCYTES # BLD AUTO: 0.5 X10^3/UL (ref 0.2–0.8)
MONOCYTES NFR BLD AUTO: 7 % (ref 2–9)
NEUTROPHILS # BLD AUTO: 4 X10^3/UL (ref 1.8–6.8)
NEUTROPHILS NFR BLD AUTO: 56 % (ref 42–75)
PLATELET # BLD AUTO: 284 X10^3/UL (ref 130–400)
PMV BLD AUTO: 8.3 FL (ref 7.4–10.4)
PROT SERPL-MCNC: 7.3 G/DL (ref 6.4–8.2)
RBC # BLD AUTO: 3.84 X10^6/UL (ref 3.82–5.3)

## 2021-09-14 PROCEDURE — 82043 UR ALBUMIN QUANTITATIVE: CPT

## 2021-09-14 PROCEDURE — 36415 COLL VENOUS BLD VENIPUNCTURE: CPT

## 2021-09-14 PROCEDURE — 81001 URINALYSIS AUTO W/SCOPE: CPT

## 2021-09-14 PROCEDURE — 87077 CULTURE AEROBIC IDENTIFY: CPT

## 2021-09-14 PROCEDURE — 85025 COMPLETE CBC W/AUTO DIFF WBC: CPT

## 2021-09-14 PROCEDURE — 82570 ASSAY OF URINE CREATININE: CPT

## 2021-09-14 PROCEDURE — 86225 DNA ANTIBODY NATIVE: CPT

## 2021-09-14 PROCEDURE — 86160 COMPLEMENT ANTIGEN: CPT

## 2021-09-14 PROCEDURE — 80053 COMPREHEN METABOLIC PANEL: CPT

## 2021-09-14 PROCEDURE — 87086 URINE CULTURE/COLONY COUNT: CPT

## 2021-09-14 PROCEDURE — 84100 ASSAY OF PHOSPHORUS: CPT

## 2021-09-14 PROCEDURE — 83735 ASSAY OF MAGNESIUM: CPT

## 2021-09-14 PROCEDURE — 86162 COMPLEMENT TOTAL (CH50): CPT

## 2021-09-14 PROCEDURE — 84156 ASSAY OF PROTEIN URINE: CPT

## 2022-06-19 NOTE — PROGRESS NOTES
Pt to IS for Rituxan infusion.  Pt stated she had previously received Rituxan, most recently in 9/2018. Stated that she did not have any reaction during previous treatment.  Pt denied questions or concerns regarding treatment.  PIV placed. CBC & CMP drawn from IV. Pt received premeds as ordered. Rituxan infusion started at 50mg/hr.  Rate increased by 50mg/hr Q30min to a final rate of 350mg/hr.  Pt received 500ml NS concurrently with Rituxan. Pt tolerated infusion well.  PIV d/c'd.  Agiocath tip remained intact.  Next appointment time confirmed.  Pt left IS in no distress.     osasis Negative

## 2024-04-11 NOTE — PROGRESS NOTES
Chemo RN at bedside.   Comment: Ulcerated Detail Level: Detailed Render Risk Assessment In Note?: no

## 2024-12-30 ENCOUNTER — TELEPHONE (OUTPATIENT)
Facility: MEDICAL CENTER | Age: 34
End: 2024-12-30

## 2024-12-30 NOTE — TELEPHONE ENCOUNTER
"[facility]  Kidney Transplant Program- Referral Review    Patient Information  Patient Name  Mendy Solano   Date of Birth  1990   MRN  [id number]   [place] Citizenship Yes   BMI  Estimated body mass index is 26.96 kg/m² as calculated from the following:    Height as of 4/24/19: 1.651 m (5' 5\").    Weight as of 4/24/19: 73.5 kg (162 lb 0.6 oz).     Referral Information  Dialysis   No    Unit Name  (Not currently on dialysis)   Modality        Did patient provide social security number?  Yes    Is an  needed: No     Ambulatory assistance needed: No     What [facility] or medical [facility] care received[facility]    Have you had any recent hospitalizations? Yes   [facility]   When: November    Specialty Providers  Do you [facility]? Yes   [facility]  Do you have a cardiologist or heart doctor? Yes   Name:  Do you have a urologist? No    Name:  Do you have any other specialty doctors? Yes   Names(s): Rheddy- arthritis  Have you had a colonoscopy? Yes   When: 2015   Where: [facility]    Provider information will be added to care team and most recent notes and testing will be requested.     Transplant Information:  Evaluated or declined at any other transplant programs? Yes  If so, which center: [facility]  Potential living donors? Yes Brother  Previously received a transplant? No   Organ: N/A  Where:  When:    Records will be requested and sent to RN coordinator for further review and plan. Patient encouraged to contact us with any questions or medical updates in the interm.     At this time, does [facility] have verbal consent from you, Mendy Solano, to request records from external facilities for your consult/evaluation? Yes  If patient stated no, we advised that the patient is required to obtain their records and bring them in with them to their first appointment.       Electronically Signed by   Marisel" Ludwin  12/30/2024 12:46 PM

## 2025-01-02 ENCOUNTER — DOCUMENTATION (OUTPATIENT)
Facility: MEDICAL CENTER | Age: 35
End: 2025-01-02

## 2025-01-02 DIAGNOSIS — Z01.818 PRE-TRANSPLANT EVALUATION FOR KIDNEY TRANSPLANT: ICD-10-CM

## 2025-01-03 NOTE — PROGRESS NOTES
"Transplant Nurse Coordinator: KTx Initial Referral Review    Patient Information  Patient Name Mendy Solano   MRN [id number]    1990   Age 34 y.o.   Sex Female    Race  or  [3]    Ethnicity   Origin (Kazakh,Cameroonian,Macanese,Guyanese, etc) [2]    BMI Estimated body mass index is 26.96 kg/m² as calculated from the following:    Height as of 19: 1.651 m (5' 5\").    Weight as of 19: 73.5 kg (162 lb 0.6 oz).      Kidney Transplant Referral Information  Referring Nephrologist  Dr. Flor Otto   Referral Date 2024     Primary Cause of CKD  HTN/SLE Nephritis    Chronic Dialysis Start Date  N/A   Modality Interested in Home Dialysis, leaning toward PD        Patient Active Problem List   Diagnosis    Acute on Chronic Anemia due to traumatic retroperitoneal hematoma S/P renal biopsy     Acute renal failure secondary to lupus nephritis     Hyperphosphatemia    Anemia of chronic disease/autoimmune     Pericardial effusion    Renal anasarca    Systemic lupus erythematosus with glomerular disease (HCC)    Hypertension       Past Medical History:   Diagnosis Date    Anemia     Hypertension     IUD (intrauterine device) in place 2018    Lupus     Renal disorder 2018    \"Stage 3 kidney disease\"    Ruptured ectopic pregnancy         Past Surgical History:   Procedure Laterality Date    PELVISCOPY  08    Performed by JEREMIAH MENJIVAR at [facility]    LYSIS ADHESIONS GYN  08    Performed by JEREMIAH MENJIVAR at [facility]        Social History     Tobacco Use    Smoking status: Never    Smokeless tobacco: Never   Substance Use Topics    Alcohol use: No    Drug use: No        CHART REVIEW  Primary Care Provider (PCP): Dr. Joo Fischer ([place])   [facility]  Cardiology: ESTHER Evangelista ([facility])    Pertinent Hx: SLE (diagnosed 3/16)-failed response to Cellcept therapy, completed " maintenance course of Rituxan, recovered from AWA from COVID 1/23, Acute metabolic acidosis, Anemia d/t CKD, CKD Stage 5, Hypertensive CKD, Hyperuricemia, Cardiomyopathy d/t SLE, Shingles L leg/shin,     Surgical Hx: Renal Biopsy 3/17 and 6/2018-complicated by perinephritic hematoma, Thoracentesis    Family Hx: No family hx of kidney disease or relatives on dialysis; Mother w/ RA    Additional History  History of Cardiac Events: Recurrent pericardial effusion, Pericardial drain, Pericardial window, Hx Pericarditis   Bleeding or Clotting Disorders: None noted  History of Stroke/TIAs: None noted  Recent Infections: None noted  Anticoagulants: None noted    Recent Hospitalization(s):  [facility] (9/15-18/2024): treated for possible lupus flare w/ Solu-Medrol and then prednisone   CC: Chest pain, R-sided discomfort x 1 week (heaviness w/o radiation)  Cardiac workup unremarkable: ECG (SR w/o ischemic changes), A1C 5.6  D/C Dx: Chest pain (resolved), AWA, CKD-stage IV, SLE nephritis, Proteinuria, HTN, Acidosis, Secondary Hyperparathyroidism, Anemia, Hx of pericarditis, HLD, Migraine    Past Diagnostic Studies    Test Date of Study Location of Study Highlights   CXR 9/15/2024 [facility] No acute cardiopulmonary findings.   EKG      Stress Test      Echo 9/16/2024 [facility] L ventricle mildly dilated, EF 55-60%. Mild mitral/tricuspid regurgitation, trace pulmonic regurgitation.   CT A/P      CT Chest      CT-Head W/O 10/16/2018 [facility] IMPRESSION: No acute intracranial findings.   Colonoscopy 2015 [facility] Records pending.   Mammogram 3/23/2023 [facility] Radiology Benign simple cyst, left breast. RECOMMENDATION: Routine annual screening mammography.    PAP Smear      Renal Biopsy 9/18/2024 [facility] Chronic injury and severe IFTA consistent w/ chronic injury consistent w/ sequelae of remote active lupus nephritis    Abdominal U/S   9/16/2024 [facility] Unremarkable exam     Pertinent Transplant  Details  Previously Received a Transplant? NO  Currently Listed or Actively Being Evaluated at Another Transplant Program? Yes    Transplant Episode  Kidney Candidate   [facility] ([place], [place]) - [facility]   Referred on 03/22/2024   Marked as Active on 04/03/2024   Reason: Pending Additional Tests     Previously Declined by Any Transplant Program(s)? No  Potential Living Donors Identified? Yes  Brother and  interested in donation     Sensitizing Events  Previous Pregnancy/Pregnancies    Blood Type  B     Special Considerations for Evaluation:    Required: NO  Preferred Language: English  Current Ambulatory Status: No assistance needed   Latest eGFR: 13 (10/11/2024)      Plan  The patient has been referred for a pre-transplant evaluation for kidney transplantation. A preliminary chart review has been completed, and the findings have been discussed with the transplant team as part of the initial assessment process. The referral information will undergo final review by the transplant team, after which the patient will be scheduled for an appointment at the [facility] [place] if appropriate.      Kavin Silva R.N.  Electronically Signed: 01/02/25, 6:34 PM  Kidney Transplant Coordinator  [facility]   [Follow-Up: _____] : a [unfilled] follow-up visit [Initial Eval - Existing Diagnosis] : an initial evaluation of an existing diagnosis

## 2025-01-22 NOTE — PROGRESS NOTES
[facility] ICU Progress Note      Admit Date: 6/21/2018  ICU Day:  4      Resident(s): Drea Lux  Attending: SHELLIE HARDY/ Dr. De La Torre    Date & Time:   6/26/2018   1:45 PM       Patient ID:    Name:             Mendy Solano   YOB: 1990  Age:                 28 y.o.  female   MRN:               [id number]    ID:  Mendy Solano is a 28 year old female with a history of lupus/lupus nephritis who presents S/P renal biopsy 6/21 with abdominal pain secondary to large perinephric and retroperitoneal hemorrhage and hematoma extending to the left lower quadrant and pelvis. CT also shows a large pericardial effusion in the setting of systemic lupus. She was started on medical therapy with prednisone, hydroxychloroquine and rituximab. Cardiology assessed patient and recommended to continue medical therapy.     Interval hisotry:  - Doing well, hemodynamically stable   - Tmax: 99.3  - UOP: 500+  - No lasix per nephrology   - Heme: Hgb stable at 7.1  - Will order repeat echo   - Stable for transfer to telemetry floor     Consultants:  PMA: Dr. Barcenas/Wil   Nephrology: Dr. Gar/Abel   Cardiology: Dr. Vale     Procedures:  Arteriogram 6/22 No active extravasation   Hemodialysis catheter 6/22    Imaging:  ECHOCARDIOGRAM COMP W/O CONT   Final Result      IR-EMBOLIZATION   Final Result      1.  Left renal angiography showing no evidence of active extravasation, pseudoaneurysm, AV fistula, or other evidence of vascular injury. No coil embolization was indicated.                  INTERPRETING LOCATION: [address] [place] [address], [facility]      IR-CVC NON TUNNELED > AGE 5   Final Result      1. ULTRASOUND AND FLUOROSCOPIC GUIDED PLACEMENT OF A RIGHT INTERNAL JUGULAR 12 [place] TRIALYSIS TRIPLE LUMEN NON-TUNNELED HEMODIALYSIS CATHETER.      2. THE HEMODIALYSIS CATHETER MAY BE USED IMMEDIATELY AS CLINICALLY INDICATED. FLUSHES PER PROTOCOL.      US-RENAL   Final Result      Redemonstration of large  hematoma anterior and lateral to the left kidney.      Moderate pelvic ascites. Trace perihepatic ascites.      CT-ABDOMEN-PELVIS W/O   Final Result         1. Large left perinephric space and left retroperitoneal hemorrhage and hematoma extending to the left lower quadrant and pelvis..      2. Large pericardial effusion. Small right pleural effusion.      3. Diffuse anasarca, abdominal and pelvic ascites.      CRITICAL RESULT READ BACK: Preliminary findings discussed with and critical read back performed by Dr. BIMAL SANTACRUZ in the Emergency Department via telephone on 6/21/2018 10:04 PM      ECHOCARDIOGRAM COMP W/O CONT    (Results Pending)       Review of Systems   Constitutional: Negative for chills and fever.   Respiratory: Negative for cough and shortness of breath.    Cardiovascular: Negative for chest pain.   Gastrointestinal: Negative for nausea and vomiting.   Genitourinary: Positive for flank pain. Negative for dysuria.   Musculoskeletal: Positive for back pain.   Neurological: Negative for dizziness and weakness.   Endo/Heme/Allergies: Negative.        PHYSICAL EXAM  Gen: NAD  HEENT: NC/AT, no scleral icterus  Cardiac: Tachycardia, audible S1S2  Respiratory: Diminished BS at bases b/l  Abdomen: BS+, soft, L flank tenderness   Ext: Trace edema b/l  Skin: Warm, dry. No rashes or erythema.   Psych: AAOx4    Respiratory:     Respiration: 18, Pulse Oximetry: 95 %    Chest Tube Drains:  None     HemoDynamics:  Pulse: 91, Heart Rate (Monitored): 92 NIBP: 134/82      Neuro:  None    Fluids:    Intake/Output Summary (Last 24 hours) at 06/22/18 0752  Last data filed at 06/22/18 0212   Gross per 24 hour   Intake              412 ml   Output                0 ml   Net              412 ml        Body mass index is 28.54 kg/m².    Recent Labs      06/24/18   0453  06/25/18   0543  06/26/18   0435   SODIUM  139  139  137   POTASSIUM  5.1  4.6  5.0   CHLORIDE  109  110  108   CO2  20  20  21   BUN  69*  70*  81*    CREATININE  3.87*  3.78*  3.49*   MAGNESIUM  2.1  2.3  2.3   PHOSPHORUS  9.4*   --    --    CALCIUM  8.1*  7.9*  8.3*       GI/Nutrition:  Recent Labs      18   0453  18   0543  18   0435   GLUCOSE  115*  106*  100*       Heme:  Recent Labs      183  18   0543  18   0435   RBC  2.31*  2.42*  2.46*   HEMOGLOBIN  7.0*  7.1*  7.2*   HEMATOCRIT  21.0*  22.6*  23.2*   PLATELETCT  170  207  240       Infectious Disease:  Temp  Av.1 °C (98.8 °F)  Min: 36.9 °C (98.5 °F)  Max: 37.4 °C (99.3 °F)  Recent Labs      1843  18   0435   WBC  10.8  11.9*  11.0*   NEUTSPOLYS  76.00*  75.80*  77.10*   LYMPHOCYTES  15.10*  14.30*  13.40*   MONOCYTES  7.00  7.00  6.30   EOSINOPHILS  0.00  0.20  0.10   BASOPHILS  0.10  0.30  0.20       Meds:  • amLODIPine  7.5 mg     • cyanocobalamin  1,000 mcg     • pneumococcal 13-Tessy Conj Vacc  0.5 mL     • carvedilol  25 mg     • isosorbide mononitrate SR  60 mg     • hydrALAZINE  10 mg     • sevelamer carbonate  800 mg     • diphenhydrAMINE  25 mg     • meperidine  25 mg     • hydrocortisone sodium succinate PF  100 mg     • HYDROcodone-acetaminophen  1 Tab     • hydroxychloroquine  200 mg     • predniSONE  60 mg     • senna-docusate  2 Tab      And   • polyethylene glycol/lytes  1 Packet      And   • magnesium hydroxide  30 mL      And   • bisacodyl  10 mg     • ondansetron  4 mg     • ondansetron  4 mg     • promethazine  12.5-25 mg     • promethazine  12.5-25 mg     • prochlorperazine  5-10 mg     • acetaminophen  650 mg     • glucose 4 g  16 g      And   • dextrose 50%  25 mL     • labetalol  10-20 mg     • hydrALAZINE  10-20 mg            Problem and Plan:    Asymptomatic bacteriuria  UA notable for bacteria, wbc, rbc   Ceftriaxone given immunosuppressed    Acute on Chronic Anemia due to traumatic retroperitoneal hematoma S/P renal biopsy   Stable   Transfuse Hgb <7   S/P 3u pRBC in ED for Hgb 6.8 on admission    TEG consistent with hypercoaguable state in the setting of lupus  SCDs only in the setting of acute bleed  Left renal angiography without active extravasation    Acute renal failure secondary to lupus nephritis   S/P Renal biopsy 6/21 consistent with lupus nephritis   Renal failure with Creatinine 3.4 with fluid overload and acidosis   Bicarbonate for acidosis  No IVF given clinically fluid overload  RIJ in place for dialysis PRN   Avoid nephrotoxic agents   Nephrology consult     Hyperkalemia  Resolved  Potassium 6 with renal failure  S/P lasix, insulin, bicarbonate and kayexalate     Hyperphosphatemia  Secondary to renal failure  Sevelamir   Goal phos <6    Anemia of chronic disease/autoimmune   Ferritin, iron studies, reticulocyte count    Pericardial effusion  Asymptomatic, no acute EKG changes  Echocardiogram     Renal anasarca  S/P fluids and PRBCs in the setting of renal failure   Strict I&O  2L Fluid restriction   Dialysis PRN, see Renal Failure for plan     Systemic lupus erythematosus with glomerular disease (HCC)  Mycophenate motifil, Hydroxychloroquine, Prednisone   Glomerular disease with lupus nephritis  Pericardial effusion with hemodynamically stability     Hypertension  Appropriately to maintain cardiac output in the setting of anemia   BP goal < 150/90   Labetolol BID   Hydralazine, Labetolol PRN     Acute on Chronic Anemia due to traumatic retroperitoneal hematoma S/P renal biopsy   CT ABD: Large left perinephric space and left retroperitoneal hemorrhage and hematoma extending to the left lower quadrant and pelvis (6/21)  No evidence of extravasation, no amenable for IR embolization   S/P 3U pRBC in ED for Hgb 6.8 on admission   HB remains stable  Plan  - CTM  - Transfuse Hgb <7   - SCDs for DVT prophylaxis   - Consider repeat CT if HGB starts to drop     Asymptomatic bacteriuria  UA notable for bacteria, wbc, rbc   S/p ceftriaxone  CTM     Acute renal failure secondary to lupus nephritis   S/P  Renal biopsy consistent with lupus nephritis (6/21)  Non-oliguric acute on chronic kidney injury   HD catheter placed on 6/22  Stable kidney function since admission, no improvement   Plan  - Avoid nephrotoxic agents   - Hold off on diuretics per nephro   - No current indications for HD  - Follow nephrology rec's     Hypertension  BP goal < 150/90   Plan:  - Continue hydralazine, imdur  - Titrate amlodipine dose   - Labetolol PRN     Pericardial effusion  Echo: Large pericardial effusion with no evidence of hemodynamic compromise (6/24)  Hemodynamically stable, no evidence of tamponade   Suspect SLE induced vs uremia vs drug induced   Plan  - Repeat echo per cards   - Follow cardiology rec's     Hyperphosphatemia  Secondary to renal failure  Sevelamir   Goal phos <6    Renal anasarca  S/P fluids and PRBCs in the setting of acute kidney injury   2L Fluid restriction with Strict I&O  Seems to be improving   Plan  - CTM  - No diuretics or HD per nephro    Systemic lupus erythematosus with glomerular disease (HCC)  Presented with lupus nephritis and pericardial effusion   On Hydroxychloroquine, Prednisone and rituximab   Plan:  - Continue current regimen   - Follow nephro rec's   - Outpatient rheumatology follow up     Anemia of chronic disease/autoimmune   Iron studies consistent with ACD  Plan:  - CTM       Quality Measures:  Lines: PIV, HD cath       Desai Catheter: None    DVT Prophylaxis: SCDs; Anemia     Ulcer Prophylaxis: Famotidine        CODE STATUS: Full     DISPO: Pending transfer to oncology floor           show

## 2025-06-02 NOTE — Clinical Note
REFERRAL APPROVAL NOTICE         Sent on June 2, 2025                   Mendy Solano  [address] [place] [address]                   Dear Ms. Solano,    After a careful review of the medical information and benefit coverage, [facility] has processed your referral. See below for additional details.    If applicable, you must be actively enrolled with your insurance for coverage of the authorized service. If you have any questions regarding your coverage, please contact your insurance directly.    REFERRAL INFORMATION   Referral #:  [id number]  Referred-To Department    Referred-By Provider:  Vascular Surgery    [facility]      [address] [facility] [place] [address]  Referring provider phone N/A [address], [address]  [facility] [address] [phone]    Referral Start Date:  06/02/2025  Referral End Date:   *** No expiration date on the referral.             SCHEDULING  If you do not already have an appointment, please call [phone] to make an appointment.     MORE INFORMATION  If you do not already have a [facility] account, sign up at: [email or web address]  You can access your medical information, make appointments, see lab results, billing information, and more.  If you have questions regarding this referral, please contact  the [facility] Referrals department at:             [phone]. Monday - Friday 8:00AM - 5:00PM.     Sincerely,    [facility]